# Patient Record
Sex: MALE | Race: WHITE | NOT HISPANIC OR LATINO | Employment: OTHER | URBAN - NONMETROPOLITAN AREA
[De-identification: names, ages, dates, MRNs, and addresses within clinical notes are randomized per-mention and may not be internally consistent; named-entity substitution may affect disease eponyms.]

---

## 2017-05-15 ENCOUNTER — OFFICE VISIT (OUTPATIENT)
Dept: FAMILY MEDICINE CLINIC | Facility: CLINIC | Age: 77
End: 2017-05-15

## 2017-05-15 VITALS
TEMPERATURE: 97.3 F | DIASTOLIC BLOOD PRESSURE: 76 MMHG | SYSTOLIC BLOOD PRESSURE: 140 MMHG | HEIGHT: 67 IN | WEIGHT: 186.2 LBS | BODY MASS INDEX: 29.22 KG/M2 | HEART RATE: 80 BPM

## 2017-05-15 DIAGNOSIS — G47.30 SLEEP APNEA, UNSPECIFIED TYPE: Chronic | ICD-10-CM

## 2017-05-15 DIAGNOSIS — E78.2 MIXED HYPERLIPIDEMIA: Chronic | ICD-10-CM
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
## 2017-05-15 DIAGNOSIS — I10 ESSENTIAL HYPERTENSION: Chronic | ICD-10-CM

## 2017-05-15 PROCEDURE — 99214 OFFICE O/P EST MOD 30 MIN: CPT | Performed by: NURSE PRACTITIONER

## 2017-05-15 RX ORDER — HYDROCHLOROTHIAZIDE 12.5 MG/1
12.5 TABLET ORAL
COMMUNITY
End: 2020-12-01

## 2017-05-15 RX ORDER — ENALAPRIL MALEATE 20 MG/1
20 TABLET ORAL DAILY
COMMUNITY
End: 2023-01-19

## 2017-05-15 RX ORDER — NITROGLYCERIN 0.4 MG/1
0.4 TABLET SUBLINGUAL
COMMUNITY

## 2017-05-15 RX ORDER — ISOSORBIDE DINITRATE 30 MG/1
30 TABLET ORAL
COMMUNITY
End: 2020-08-10

## 2017-05-15 RX ORDER — CLOPIDOGREL BISULFATE 75 MG/1
75 TABLET ORAL DAILY
COMMUNITY
End: 2023-01-19

## 2017-05-15 RX ORDER — ATORVASTATIN CALCIUM 80 MG/1
80 TABLET, FILM COATED ORAL NIGHTLY
COMMUNITY
End: 2023-01-19

## 2017-10-13 ENCOUNTER — OFFICE VISIT (OUTPATIENT)
Dept: FAMILY MEDICINE CLINIC | Facility: CLINIC | Age: 77
End: 2017-10-13

## 2017-10-13 VITALS
DIASTOLIC BLOOD PRESSURE: 96 MMHG | WEIGHT: 183 LBS | OXYGEN SATURATION: 98 % | SYSTOLIC BLOOD PRESSURE: 148 MMHG | TEMPERATURE: 97.6 F | HEIGHT: 67 IN | BODY MASS INDEX: 28.72 KG/M2 | HEART RATE: 65 BPM

## 2017-10-13 DIAGNOSIS — J01.90 ACUTE SINUSITIS, RECURRENCE NOT SPECIFIED, UNSPECIFIED LOCATION: Primary | ICD-10-CM

## 2017-10-13 DIAGNOSIS — J01.00 ACUTE NON-RECURRENT MAXILLARY SINUSITIS: Primary | ICD-10-CM

## 2017-10-13 PROCEDURE — 99213 OFFICE O/P EST LOW 20 MIN: CPT | Performed by: FAMILY MEDICINE

## 2017-10-13 PROCEDURE — 96372 THER/PROPH/DIAG INJ SC/IM: CPT | Performed by: FAMILY MEDICINE

## 2017-10-13 RX ORDER — AZITHROMYCIN 250 MG/1
TABLET, FILM COATED ORAL
Qty: 6 TABLET | Refills: 0 | Status: SHIPPED | OUTPATIENT
Start: 2017-10-13 | End: 2017-12-27

## 2017-10-13 RX ORDER — TRIAMCINOLONE ACETONIDE 40 MG/ML
40 INJECTION, SUSPENSION INTRA-ARTICULAR; INTRAMUSCULAR ONCE
Status: COMPLETED | OUTPATIENT
Start: 2017-10-13 | End: 2017-10-13

## 2017-10-13 RX ADMIN — TRIAMCINOLONE ACETONIDE 40 MG: 40 INJECTION, SUSPENSION INTRA-ARTICULAR; INTRAMUSCULAR at 13:12

## 2017-10-13 NOTE — PROGRESS NOTES
"Subjective   Chief Complaint   Patient presents with   • Nasal Congestion   • Cough     Steve Bethea is a 77 y.o. male.   Nasal Congestion and Cough    Cough   This is a new problem. The current episode started in the past 7 days. The problem has been unchanged. The cough is productive of sputum. Associated symptoms include headaches, postnasal drip and rhinorrhea. Pertinent negatives include no chest pain, chills, ear pain, fever, sore throat or shortness of breath. Treatments tried: warm compresses to the face. The treatment provided no relief. There is no history of asthma or COPD.   Complaining of jaw pain   Didn't sleep well last night   Uses CPAP    The following portions of the patient's history were reviewed and updated as appropriate: allergies, current medications, past family history, past medical history, past social history, past surgical history and problem list.    Review of Systems   Constitutional: Positive for fatigue. Negative for chills and fever.   HENT: Positive for congestion, postnasal drip, rhinorrhea, sinus pain and sinus pressure. Negative for ear discharge, ear pain and sore throat.    Respiratory: Positive for cough. Negative for shortness of breath.    Cardiovascular: Negative for chest pain.   Neurological: Positive for headaches.       Objective   /96  Pulse 65  Temp 97.6 °F (36.4 °C)  Ht 67\" (170.2 cm)  Wt 183 lb (83 kg)  SpO2 98%  BMI 28.66 kg/m2  Physical Exam   Constitutional: He is oriented to person, place, and time. He appears well-developed and well-nourished.   HENT:   Head: Normocephalic and atraumatic.   Right Ear: Hearing and ear canal normal.   Left Ear: Hearing and ear canal normal.   Nose: Rhinorrhea (clear) present. Right sinus exhibits maxillary sinus tenderness. Right sinus exhibits no frontal sinus tenderness. Left sinus exhibits maxillary sinus tenderness. Left sinus exhibits no frontal sinus tenderness.   Mouth/Throat: Posterior oropharyngeal " erythema present.   Postnasal drainage   Eyes: Pupils are equal, round, and reactive to light.   Neck: Normal range of motion. Neck supple.   Cardiovascular: Normal rate, regular rhythm and normal heart sounds.    Pulmonary/Chest: Effort normal and breath sounds normal. No respiratory distress. He has no wheezes. He has no rales.   Abdominal: Soft. Bowel sounds are normal.   Musculoskeletal: Normal range of motion.   Neurological: He is alert and oriented to person, place, and time.   Skin: Skin is warm and dry.   Psychiatric: He has a normal mood and affect.   Nursing note and vitals reviewed.      Assessment/Plan   Problems Addressed this Visit        Respiratory    Acute non-recurrent maxillary sinusitis - Primary        Declined kenalog IM    Requested abx  Start azithromycin  No interaction on medication list    Recheck as needed

## 2017-10-17 ENCOUNTER — OFFICE VISIT (OUTPATIENT)
Dept: FAMILY MEDICINE CLINIC | Facility: CLINIC | Age: 77
End: 2017-10-17

## 2017-10-17 VITALS
BODY MASS INDEX: 28.72 KG/M2 | TEMPERATURE: 97.4 F | SYSTOLIC BLOOD PRESSURE: 114 MMHG | HEART RATE: 65 BPM | OXYGEN SATURATION: 97 % | HEIGHT: 67 IN | DIASTOLIC BLOOD PRESSURE: 68 MMHG | WEIGHT: 183 LBS

## 2017-10-17 DIAGNOSIS — J01.10 ACUTE NON-RECURRENT FRONTAL SINUSITIS: Primary | ICD-10-CM

## 2017-10-17 PROCEDURE — 99213 OFFICE O/P EST LOW 20 MIN: CPT | Performed by: NURSE PRACTITIONER

## 2017-10-17 RX ORDER — AMOXICILLIN 500 MG/1
1000 CAPSULE ORAL 2 TIMES DAILY
Qty: 20 CAPSULE | Refills: 0 | Status: SHIPPED | OUTPATIENT
Start: 2017-10-17 | End: 2017-10-22

## 2017-10-17 RX ORDER — FLUTICASONE PROPIONATE 50 MCG
2 SPRAY, SUSPENSION (ML) NASAL DAILY
Qty: 1 BOTTLE | Refills: 0 | Status: SHIPPED | OUTPATIENT
Start: 2017-10-17 | End: 2020-11-24

## 2017-10-17 NOTE — PROGRESS NOTES
Subjective   Steve Bethea is a 77 y.o. male. Patient here today with complaints of Sinus Problem (Went to Dr Street last week and got steroid shot.)  pt here today with complaints of sore throat, nasal congestion, denies fever. Has been sick x Friday.     Vitals:    10/17/17 1309   BP: 114/68   Pulse: 65   Temp: 97.4 °F (36.3 °C)   SpO2: 97%     Past Medical History:   Diagnosis Date   • Coronary arteriosclerosis    • Degenerative joint disease involving multiple joints    • Hypertension    • Plantar fasciitis    • Upper respiratory infection      Sinus Problem   This is a new problem. The current episode started in the past 7 days. The problem has been gradually worsening since onset. There has been no fever. The pain is mild. Associated symptoms include congestion, ear pain, a hoarse voice and a sore throat. Pertinent negatives include no chills, coughing, headaches, neck pain, shortness of breath, sinus pressure or swollen glands. Treatments tried: steroid injection. The treatment provided no relief.        The following portions of the patient's history were reviewed and updated as appropriate: allergies, current medications, past family history, past medical history, past social history, past surgical history and problem list.    Review of Systems   Constitutional: Negative.  Negative for chills.   HENT: Positive for congestion, ear pain, hoarse voice and sore throat. Negative for sinus pressure.    Eyes: Negative.    Respiratory: Negative.  Negative for cough and shortness of breath.    Cardiovascular: Negative.    Gastrointestinal: Negative.    Endocrine: Negative.    Genitourinary: Negative.    Musculoskeletal: Negative.  Negative for neck pain.   Skin: Negative.    Allergic/Immunologic: Negative.    Neurological: Negative.  Negative for headaches.   Hematological: Negative.    Psychiatric/Behavioral: Negative.        Objective   Physical Exam   Constitutional: He is oriented to person, place, and  time. He appears well-developed and well-nourished. No distress.   HENT:   Head: Normocephalic and atraumatic.   Right Ear: No drainage, swelling or tenderness. No middle ear effusion. Decreased hearing is noted.   Left Ear: No drainage, swelling or tenderness. A middle ear effusion is present. Decreased hearing is noted.   Neck: Neck supple.   Cardiovascular: Normal rate, regular rhythm and normal heart sounds.  Exam reveals no gallop and no friction rub.    No murmur heard.  Pulmonary/Chest: Effort normal and breath sounds normal. No respiratory distress. He has no wheezes. He has no rales.   Musculoskeletal: Normal range of motion.   Neurological: He is alert and oriented to person, place, and time.   Skin: Skin is warm and dry. No rash noted. He is not diaphoretic. No erythema. No pallor.   Psychiatric: He has a normal mood and affect. His behavior is normal. Judgment and thought content normal.   Nursing note and vitals reviewed.      Assessment/Plan   Steve was seen today for sinus problem.    Diagnoses and all orders for this visit:    Acute non-recurrent frontal sinusitis    Other orders  -     fluticasone (FLONASE) 50 MCG/ACT nasal spray; 2 sprays into each nostril Daily.  -     amoxicillin (AMOXIL) 500 MG capsule; Take 2 capsules by mouth 2 (Two) Times a Day for 5 days.      He is advised to start pushing fluids, is given amoxil and flonase as above. Will RTC if his symptoms persist or worsen. All questions and concerns are addressed with understanding noted. The patient is in agreement to above plan.

## 2017-10-23 ENCOUNTER — OFFICE VISIT (OUTPATIENT)
Dept: FAMILY MEDICINE CLINIC | Facility: CLINIC | Age: 77
End: 2017-10-23

## 2017-10-23 VITALS
BODY MASS INDEX: 26.28 KG/M2 | TEMPERATURE: 98.4 F | DIASTOLIC BLOOD PRESSURE: 64 MMHG | SYSTOLIC BLOOD PRESSURE: 122 MMHG | HEART RATE: 60 BPM | WEIGHT: 183.6 LBS | HEIGHT: 70 IN

## 2017-10-23 DIAGNOSIS — J01.00 ACUTE NON-RECURRENT MAXILLARY SINUSITIS: ICD-10-CM

## 2017-10-23 DIAGNOSIS — H65.03 BILATERAL ACUTE SEROUS OTITIS MEDIA, RECURRENCE NOT SPECIFIED: Primary | ICD-10-CM

## 2017-10-23 DIAGNOSIS — H65.03 BILATERAL ACUTE SEROUS OTITIS MEDIA, RECURRENCE NOT SPECIFIED: ICD-10-CM

## 2017-10-23 DIAGNOSIS — J01.10 ACUTE NON-RECURRENT FRONTAL SINUSITIS: Primary | ICD-10-CM

## 2017-10-23 PROCEDURE — 99213 OFFICE O/P EST LOW 20 MIN: CPT | Performed by: NURSE PRACTITIONER

## 2017-10-23 RX ORDER — PREDNISONE 20 MG/1
20 TABLET ORAL 2 TIMES DAILY
Qty: 10 TABLET | Refills: 0 | Status: SHIPPED | OUTPATIENT
Start: 2017-10-23 | End: 2017-10-28

## 2017-10-23 NOTE — PROGRESS NOTES
Subjective   Steve Bethea is a 77 y.o. male. Patient here today with complaints of Sinusitis  Patient here today with complaints of still feeling sick with sinus congestion, nasal congestion, cough.  He has been having these symptoms ×2 weeks, was seen by Dr. Street, here and again in urgent care and over this time has been given Rocephin injection, steroid injection, symptoms persisted.  Was seen in urgent care on Friday and was started on doxycycline which he states he is currently taking.  He still complaining of small amount of productive cough with odorous nasal drainage, denies headache denies fever.  Would like to be referred on to ENT for further evaluation at this point since symptoms have not significantly improved.    Vitals:    10/23/17 1341   BP: 122/64   Pulse: 60   Temp: 98.4 °F (36.9 °C)     Past Medical History:   Diagnosis Date   • Coronary arteriosclerosis    • Degenerative joint disease involving multiple joints    • Hypertension    • Plantar fasciitis    • Upper respiratory infection      Sinusitis   This is a new problem. The current episode started 1 to 4 weeks ago. The problem is unchanged. There has been no fever. He is experiencing no pain. Associated symptoms include congestion, coughing, headaches, sinus pressure, sneezing and a sore throat. Pertinent negatives include no chills, diaphoresis, ear pain, hoarse voice, neck pain, shortness of breath or swollen glands. Past treatments include antibiotics and saline sprays (injection steroid). The treatment provided no relief.        The following portions of the patient's history were reviewed and updated as appropriate: allergies, current medications, past family history, past medical history, past social history, past surgical history and problem list.    Review of Systems   Constitutional: Negative.  Negative for chills and diaphoresis.   HENT: Positive for congestion, sinus pressure, sneezing and sore throat. Negative for ear pain  and hoarse voice.    Eyes: Negative.    Respiratory: Positive for cough. Negative for shortness of breath.    Cardiovascular: Negative.    Gastrointestinal: Negative.    Endocrine: Negative.    Genitourinary: Negative.    Musculoskeletal: Negative.  Negative for neck pain.   Skin: Negative.    Allergic/Immunologic: Negative.    Neurological: Positive for headaches.   Hematological: Negative.    Psychiatric/Behavioral: Negative.        Objective   Physical Exam   Constitutional: He is oriented to person, place, and time. He appears well-developed and well-nourished. No distress.   HENT:   Head: Normocephalic and atraumatic.   Right Ear: Hearing, external ear and ear canal normal. A middle ear effusion is present.   Left Ear: Hearing, external ear and ear canal normal. A middle ear effusion is present.   Nose: Right sinus exhibits no maxillary sinus tenderness and no frontal sinus tenderness. Left sinus exhibits no maxillary sinus tenderness and no frontal sinus tenderness.   Mouth/Throat: Uvula is midline, oropharynx is clear and moist and mucous membranes are normal. No tonsillar exudate.   Neck: Neck supple.   Cardiovascular: Normal rate, regular rhythm and normal heart sounds.  Exam reveals no gallop and no friction rub.    No murmur heard.  Pulmonary/Chest: Effort normal and breath sounds normal. No respiratory distress. He has no wheezes. He has no rales.   Lymphadenopathy:     He has no cervical adenopathy.   Neurological: He is alert and oriented to person, place, and time.   Skin: Skin is warm and dry. No rash noted. He is not diaphoretic. No erythema. No pallor.   Psychiatric: He has a normal mood and affect. His behavior is normal. Judgment and thought content normal. His speech is rapid and/or pressured. He is not actively hallucinating. Cognition and memory are normal. He is attentive.   Nursing note and vitals reviewed.      Assessment/Plan   Steve was seen today for sinusitis.    Diagnoses and all  orders for this visit:    Acute non-recurrent frontal sinusitis    Acute non-recurrent maxillary sinusitis    Bilateral acute serous otitis media, recurrence not specified    Other orders  -     predniSONE (DELTASONE) 20 MG tablet; Take 1 tablet by mouth 2 (Two) Times a Day for 5 days.     He is advised to continue on doxycycline as urgent care has prescribed on Friday.  I will add by mouth steroids as above and will refer him to ENT of choice for further evaluation and treatment as they deem necessary.  He is aware.  She does complaints persist or worsen prior to seeing ENT he can return here for follow-up.  He is aware and is in agreement to this plan.  All questions and concerns are addressed with understanding noted.  Records from urgent care were reviewed.

## 2017-12-27 ENCOUNTER — OFFICE VISIT (OUTPATIENT)
Dept: FAMILY MEDICINE CLINIC | Facility: CLINIC | Age: 77
End: 2017-12-27

## 2017-12-27 VITALS
HEART RATE: 85 BPM | TEMPERATURE: 98.4 F | WEIGHT: 186.2 LBS | DIASTOLIC BLOOD PRESSURE: 58 MMHG | BODY MASS INDEX: 26.66 KG/M2 | SYSTOLIC BLOOD PRESSURE: 120 MMHG | HEIGHT: 70 IN

## 2017-12-27 DIAGNOSIS — H65.03 BILATERAL ACUTE SEROUS OTITIS MEDIA, RECURRENCE NOT SPECIFIED: Primary | ICD-10-CM

## 2017-12-27 PROCEDURE — 96372 THER/PROPH/DIAG INJ SC/IM: CPT | Performed by: NURSE PRACTITIONER

## 2017-12-27 PROCEDURE — 99213 OFFICE O/P EST LOW 20 MIN: CPT | Performed by: NURSE PRACTITIONER

## 2017-12-27 RX ORDER — TRIAMCINOLONE ACETONIDE 40 MG/ML
40 INJECTION, SUSPENSION INTRA-ARTICULAR; INTRAMUSCULAR ONCE
Status: COMPLETED | OUTPATIENT
Start: 2017-12-27 | End: 2017-12-27

## 2017-12-27 RX ADMIN — TRIAMCINOLONE ACETONIDE 40 MG: 40 INJECTION, SUSPENSION INTRA-ARTICULAR; INTRAMUSCULAR at 14:06

## 2017-12-27 NOTE — PROGRESS NOTES
"Subjective   Steve Bethea is a 77 y.o. male. Patient here today with complaints of Earache  pt here today with complaints of sounding \"bees\" and \" wind\" in R ear. Denies cough, fever, still has some thick nasal drg, off and on. Symptoms started this am.     Vitals:    12/27/17 1346   BP: 120/58   Pulse: 85   Temp: 98.4 °F (36.9 °C)     Past Medical History:   Diagnosis Date   • Coronary arteriosclerosis    • Degenerative joint disease involving multiple joints    • Hypertension    • Plantar fasciitis    • Upper respiratory infection      Earache    There is pain in the right ear. This is a new problem. The current episode started yesterday. The problem occurs constantly. The problem has been gradually worsening. There has been no fever. The pain is mild. Associated symptoms include hearing loss. Pertinent negatives include no abdominal pain, coughing, diarrhea, ear discharge, headaches, neck pain, rash, rhinorrhea, sore throat or vomiting. He has tried nothing for the symptoms. The treatment provided no relief. His past medical history is significant for hearing loss.        The following portions of the patient's history were reviewed and updated as appropriate: allergies, current medications, past family history, past medical history, past social history, past surgical history and problem list.    Review of Systems   Constitutional: Negative.    HENT: Positive for ear pain and hearing loss. Negative for ear discharge, rhinorrhea and sore throat.    Eyes: Negative.    Respiratory: Negative.  Negative for cough.    Cardiovascular: Negative.    Gastrointestinal: Negative.  Negative for abdominal pain, diarrhea and vomiting.   Endocrine: Negative.    Genitourinary: Negative.    Musculoskeletal: Negative.  Negative for neck pain.   Skin: Negative.  Negative for rash.   Allergic/Immunologic: Negative.    Neurological: Negative.  Negative for headaches.   Hematological: Negative.    Psychiatric/Behavioral: " Negative.        Objective   Physical Exam   Constitutional: He is oriented to person, place, and time. He appears well-developed and well-nourished. No distress.   HENT:   Head: Normocephalic and atraumatic.   Right Ear: External ear and ear canal normal. There is tenderness. No drainage or swelling. Tympanic membrane is bulging. A middle ear effusion is present. Decreased hearing is noted.   Left Ear: Hearing, external ear and ear canal normal. No drainage, swelling or tenderness. A middle ear effusion is present. No decreased hearing is noted.   Nose: Right sinus exhibits no maxillary sinus tenderness and no frontal sinus tenderness. Left sinus exhibits no maxillary sinus tenderness and no frontal sinus tenderness.   Mouth/Throat: Uvula is midline, oropharynx is clear and moist and mucous membranes are normal. No tonsillar exudate.   Neck: Neck supple.   Cardiovascular: Normal rate, regular rhythm and normal heart sounds.  Exam reveals no gallop and no friction rub.    No murmur heard.  Pulmonary/Chest: Effort normal and breath sounds normal. No respiratory distress. He has no wheezes. He has no rales.   Neurological: He is alert and oriented to person, place, and time.   Skin: Skin is warm and dry. No rash noted. He is not diaphoretic. No erythema. No pallor.   Psychiatric: He has a normal mood and affect. His behavior is normal.   Nursing note and vitals reviewed.      Assessment/Plan   Steve was seen today for earache.    Diagnoses and all orders for this visit:    Bilateral acute serous otitis media, recurrence not specified  -     triamcinolone acetonide (KENALOG-40) injection 40 mg; Inject 1 mL into the shoulder, thigh, or buttocks 1 (One) Time.        He is given Kenalog in office today as above and states he already has Flonase at home to use.  If he does not, he will call back for this prescription to be given to him.  She does symptoms persist or worsen he is to return to clinic for follow-up.   Otherwise, I will see him back as scheduled for chronic conditions.  All questions and concerns are addressed with understanding noted. The patient is aware and is in agreement to above plan.

## 2018-01-25 ENCOUNTER — OFFICE VISIT (OUTPATIENT)
Dept: FAMILY MEDICINE CLINIC | Facility: CLINIC | Age: 78
End: 2018-01-25

## 2018-01-25 VITALS
HEART RATE: 68 BPM | HEIGHT: 70 IN | TEMPERATURE: 97.5 F | DIASTOLIC BLOOD PRESSURE: 62 MMHG | WEIGHT: 177.4 LBS | BODY MASS INDEX: 25.4 KG/M2 | OXYGEN SATURATION: 98 % | SYSTOLIC BLOOD PRESSURE: 120 MMHG

## 2018-01-25 DIAGNOSIS — H65.01 RIGHT ACUTE SEROUS OTITIS MEDIA, RECURRENCE NOT SPECIFIED: ICD-10-CM

## 2018-01-25 DIAGNOSIS — R42 VERTIGO: Primary | ICD-10-CM

## 2018-01-25 DIAGNOSIS — I10 ESSENTIAL HYPERTENSION: Chronic | ICD-10-CM

## 2018-01-25 DIAGNOSIS — I25.10 CORONARY ARTERIOSCLEROSIS: Chronic | ICD-10-CM

## 2018-01-25 PROCEDURE — 99213 OFFICE O/P EST LOW 20 MIN: CPT | Performed by: NURSE PRACTITIONER

## 2018-01-25 NOTE — PROGRESS NOTES
Subjective   Steve Bethea is a 77 y.o. male. Patient here today with complaints of Dizziness  pt here today with complaints of vertigo, dizziness and occasional n/v. Symptoms started 2 weeks ago, at that time he was taken to [facility] ER for eval, given meclizine which does help somewhat, he has seen dr nunez who prescribed singulair however symptoms are persisting, not resolved. Has flonase to use but has not been using this. Denies fever, chest pain or shortness of breath, denies nasal congestion, sinus pain/pressure. Has seen dr tanja aguilar previously , requesting referral back to him. Cont to see dr tapia and has appt with him on 2-11-18    Vitals:    01/25/18 1010   BP: 120/62   Pulse: 68   Temp: 97.5 °F (36.4 °C)   SpO2: 98%     Past Medical History:   Diagnosis Date   • Coronary arteriosclerosis    • Degenerative joint disease involving multiple joints    • Hypertension    • Plantar fasciitis    • Upper respiratory infection      Dizziness   This is a new problem. The current episode started 1 to 4 weeks ago. The problem occurs constantly. The problem has been waxing and waning. Associated symptoms include vertigo. Pertinent negatives include no abdominal pain, anorexia, arthralgias, change in bowel habit, chest pain, chills, congestion, coughing, diaphoresis, fatigue, fever, headaches, joint swelling, myalgias, nausea, neck pain, numbness, rash, sore throat, swollen glands, urinary symptoms, visual change, vomiting or weakness. Nothing aggravates the symptoms. Treatments tried: meclizine, singulair. The treatment provided mild relief.        The following portions of the patient's history were reviewed and updated as appropriate: allergies, current medications, past family history, past medical history, past social history, past surgical history and problem list.    Review of Systems   Constitutional: Negative.  Negative for chills, diaphoresis, fatigue and fever.   HENT: Negative.  Negative for congestion and  sore throat.    Eyes: Negative.    Respiratory: Negative.  Negative for cough.    Cardiovascular: Negative.  Negative for chest pain.   Gastrointestinal: Negative.  Negative for abdominal pain, anorexia, change in bowel habit, nausea and vomiting.   Endocrine: Negative.    Genitourinary: Negative.    Musculoskeletal: Negative.  Negative for arthralgias, joint swelling, myalgias and neck pain.   Skin: Negative.  Negative for rash.   Allergic/Immunologic: Negative.    Neurological: Positive for dizziness and vertigo. Negative for weakness, numbness and headaches.   Hematological: Negative.    Psychiatric/Behavioral: Negative.        Objective   Physical Exam   Constitutional: He is oriented to person, place, and time. He appears well-developed and well-nourished. No distress.   HENT:   Head: Normocephalic and atraumatic.   Right Ear: Hearing, external ear and ear canal normal. Tympanic membrane is bulging. A middle ear effusion is present.   Left Ear: Hearing, tympanic membrane, external ear and ear canal normal.   Nose: Nose normal. Right sinus exhibits no maxillary sinus tenderness and no frontal sinus tenderness. Left sinus exhibits no maxillary sinus tenderness and no frontal sinus tenderness.   Mouth/Throat: Uvula is midline, oropharynx is clear and moist and mucous membranes are normal. No tonsillar exudate.   Neck: Neck supple. Normal carotid pulses present. Carotid bruit is not present.   Cardiovascular: Normal rate, regular rhythm and normal heart sounds.  Exam reveals no gallop and no friction rub.    No murmur heard.  Pulmonary/Chest: Effort normal and breath sounds normal. No respiratory distress. He has no wheezes. He has no rales.   Lymphadenopathy:     He has no cervical adenopathy.   Neurological: He is alert and oriented to person, place, and time.   Skin: He is not diaphoretic.   Psychiatric: He has a normal mood and affect. His behavior is normal. His speech is rapid and/or pressured.   Nursing note  and vitals reviewed.      Assessment/Plan   Steve was seen today for dizziness.    Diagnoses and all orders for this visit:    Vertigo    Right acute serous otitis media, recurrence not specified    Essential hypertension    Coronary arteriosclerosis    referred to dr tanja aguilar for further eval and treatment as he deems nec  Advised he restart flonase daily and use meclizine prn until he sees ENT  Can follow up here , urgent care, ER should symptoms persist or worsen prior to seeing ENT  All questions and concerns are addressed with understanding noted.   The patient is aware and is in agreement to above plan.

## 2018-01-29 DIAGNOSIS — H92.01 EARACHE ON RIGHT: ICD-10-CM

## 2018-01-29 DIAGNOSIS — R42 VERTIGO: Primary | ICD-10-CM

## 2018-02-20 ENCOUNTER — OFFICE VISIT (OUTPATIENT)
Dept: FAMILY MEDICINE CLINIC | Facility: CLINIC | Age: 78
End: 2018-02-20

## 2018-02-20 VITALS
SYSTOLIC BLOOD PRESSURE: 130 MMHG | BODY MASS INDEX: 25.71 KG/M2 | HEART RATE: 61 BPM | TEMPERATURE: 96.9 F | OXYGEN SATURATION: 97 % | DIASTOLIC BLOOD PRESSURE: 80 MMHG | WEIGHT: 179.6 LBS | RESPIRATION RATE: 14 BRPM | HEIGHT: 70 IN

## 2018-02-20 DIAGNOSIS — I10 ESSENTIAL HYPERTENSION: ICD-10-CM

## 2018-02-20 DIAGNOSIS — R42 VERTIGO: Primary | ICD-10-CM

## 2018-02-20 PROCEDURE — 99213 OFFICE O/P EST LOW 20 MIN: CPT | Performed by: FAMILY MEDICINE

## 2018-02-20 RX ORDER — ASPIRIN 81 MG/1
81 TABLET ORAL DAILY
COMMUNITY

## 2018-02-20 RX ORDER — PREDNISONE 20 MG/1
20 TABLET ORAL 2 TIMES DAILY
Qty: 10 TABLET | Refills: 0 | Status: SHIPPED | OUTPATIENT
Start: 2018-02-20 | End: 2018-05-21

## 2018-02-20 RX ORDER — MECLIZINE HYDROCHLORIDE 25 MG/1
25 TABLET ORAL 3 TIMES DAILY PRN
Qty: 30 TABLET | Refills: 2 | Status: SHIPPED | OUTPATIENT
Start: 2018-02-20 | End: 2018-03-13 | Stop reason: SDUPTHER

## 2018-02-20 NOTE — PROGRESS NOTES
Subjective   Steve Bethea is a 77 y.o. male.   Chief Complaint   Patient presents with   • Dizziness       Dizziness   This is a recurrent problem. The current episode started in the past 7 days. The problem occurs intermittently. The problem has been waxing and waning. Associated symptoms include nausea, vertigo and vomiting. The symptoms are aggravated by bending.        The following portions of the patient's history were reviewed and updated as appropriate: allergies, current medications, past family history, past medical history, past social history, past surgical history and problem list.    Review of Systems   Constitutional: Negative.    HENT: Negative.    Eyes: Negative.    Respiratory: Negative.    Cardiovascular: Negative.    Gastrointestinal: Positive for nausea and vomiting.   Endocrine: Negative.    Genitourinary: Negative.    Musculoskeletal: Negative.    Skin: Negative.    Allergic/Immunologic: Negative.    Neurological: Positive for dizziness and vertigo.   Hematological: Negative.    Psychiatric/Behavioral: Negative.    All other systems reviewed and are negative.      Objective   Physical Exam   Constitutional: He is oriented to person, place, and time. He appears well-developed and well-nourished.   HENT:   Head: Normocephalic and atraumatic.   Right Ear: External ear normal.   Left Ear: External ear normal.   Nose: Nose normal.   Mouth/Throat: Oropharynx is clear and moist.   Eyes: Conjunctivae and EOM are normal. Pupils are equal, round, and reactive to light.   Neck: Normal range of motion. Neck supple.   Cardiovascular: Normal rate, regular rhythm, normal heart sounds and intact distal pulses.  Exam reveals no gallop and no friction rub.    No murmur heard.  Pulmonary/Chest: Effort normal and breath sounds normal. He has no wheezes. He has no rales.   Abdominal: Soft. Bowel sounds are normal. He exhibits no mass. There is no tenderness. There is no rebound and no guarding.    Musculoskeletal: Normal range of motion.   Neurological: He is alert and oriented to person, place, and time. He has normal reflexes. A cranial nerve deficit is present. He exhibits normal muscle tone.   nystagmus   Skin: Skin is warm and dry. No rash noted.   Psychiatric: He has a normal mood and affect. His behavior is normal. Judgment and thought content normal.   Nursing note and vitals reviewed.      Assessment/Plan   Steve was seen today for dizziness.    Diagnoses and all orders for this visit:    Vertigo  -     Ambulatory Referral to ENT (Otolaryngology)    Essential hypertension    Other orders  -     predniSONE (DELTASONE) 20 MG tablet; Take 1 tablet by mouth 2 (Two) Times a Day.  -     meclizine (ANTIVERT) 25 MG tablet; Take 1 tablet by mouth 3 (Three) Times a Day As Needed for dizziness.

## 2018-03-13 ENCOUNTER — CLINICAL SUPPORT (OUTPATIENT)
Dept: AUDIOLOGY | Facility: CLINIC | Age: 78
End: 2018-03-13

## 2018-03-13 ENCOUNTER — OFFICE VISIT (OUTPATIENT)
Dept: OTOLARYNGOLOGY | Facility: CLINIC | Age: 78
End: 2018-03-13

## 2018-03-13 VITALS
WEIGHT: 180 LBS | DIASTOLIC BLOOD PRESSURE: 65 MMHG | HEIGHT: 70 IN | BODY MASS INDEX: 25.77 KG/M2 | SYSTOLIC BLOOD PRESSURE: 110 MMHG

## 2018-03-13 DIAGNOSIS — R42 DIZZINESS: Primary | ICD-10-CM

## 2018-03-13 DIAGNOSIS — H81.01 MENIERE'S DISEASE OF RIGHT EAR: ICD-10-CM

## 2018-03-13 DIAGNOSIS — H90.3 SENSORINEURAL HEARING LOSS, ASYMMETRICAL: Primary | ICD-10-CM

## 2018-03-13 DIAGNOSIS — H90.3 ASYMMETRICAL SENSORINEURAL HEARING LOSS: ICD-10-CM

## 2018-03-13 PROCEDURE — 99203 OFFICE O/P NEW LOW 30 MIN: CPT | Performed by: OTOLARYNGOLOGY

## 2018-03-13 RX ORDER — MECLIZINE HYDROCHLORIDE 25 MG/1
25 TABLET ORAL 3 TIMES DAILY PRN
Qty: 30 TABLET | Refills: 2 | Status: SHIPPED | OUTPATIENT
Start: 2018-03-13 | End: 2018-05-10 | Stop reason: SDUPTHER

## 2018-03-13 RX ORDER — ONDANSETRON 4 MG/1
4 TABLET, FILM COATED ORAL EVERY 8 HOURS PRN
COMMUNITY
End: 2020-11-24

## 2018-03-13 RX ORDER — TRIAMTERENE AND HYDROCHLOROTHIAZIDE 37.5; 25 MG/1; MG/1
TABLET ORAL
Qty: 30 TABLET | Refills: 0 | Status: SHIPPED | OUTPATIENT
Start: 2018-03-13 | End: 2020-11-24

## 2018-03-13 NOTE — PROGRESS NOTES
STANDARD AUDIOMETRIC EVALUATION      Name:  Steve Bethea  :  1940  Age:  77 y.o.  Date of Evaluation:  3/13/2018      HISTORY    Reason for visit:  Steve Bethea is seen today for a hearing evaluation at the request of Dr. Steve Ritchie.  Patient reports he experienced a spinning dizziness on 2017.  He states he has hearing loss in both ears, and the hearing comes and goes in his right ear.  He states he hears noises in his right ear.      EVALUATION    See Audiogram    RESULTS        Otoscopy and Tympanometry 226 Hz :  Right Ear:  Otoscopy:  Testing completed after ears were examined by the ENT physician          Tympanometry:  Middle ear function within normal limits    Left Ear:   Otoscopy:  Testing completed after ears were examined by the ENT physician        Tympanometry:  Middle ear function within normal limits    Test technique:  Standard Audiometry     Pure Tone Audiometry:   Patient responded to pure tones at 30-70 dB for 250-8000 Hz in right ear, and at 20-65 dB for 250-8000 Hz in left ear.       Speech Audiometry:        Right Ear:  Speech Reception Threshold (SRT) was obtained at 30 dBHL                 Speech Discrimination scores were 72% in quiet when words were presented at 70 dBHL       Left Ear:  Speech Reception Threshold (SRT) was obtained at 20 dBHL                 Speech Discrimination scores were 96% in quiet when words were presented at 60 dBHL    Reliability:   good    IMPRESSIONS:  1.  Tympanometry results are consistent with Middle ear function within normal limits in both ears.  2.  Pure tone results are consistent with mild to moderately severe sloping sensorineural hearing loss  for right ear, and normal to moderately severe sloping, high frequency sensorineural hearing loss in left ear.       RECOMMENDATIONS:  Patient is seeing the Ear Nose and Throat physician immediately following this examination.  It was a pleasure seeing Steve Bethea in  Audiology today.  We would be happy to do further testing or discuss these test as necessary.          This document has been electronically signed by Bee Montoya MS CCC-A on March 13, 2018 3:50 PM       Bee Montoya MS CCC-A  Licensed Audiologist

## 2018-03-15 NOTE — PROGRESS NOTES
Subjective   Steve Bethea is a 77 y.o. male.     History of Present Illness   Patient is here for evaluation of dizziness.  Reports that on December 29 he awoke with a sensation of spinning dizziness and associated nausea.  Since that time as had recurring episodes of dizziness and associated nausea and vomiting, most recently approximately 10 or 12 days ago.  Has had 2 emergency room visits.  Has been taking meclizine.  Also feels like his hearing decreased in the right ear around the same time as he had the first episode of dizziness.  No otorrhea, no previous otologic surgery.      The following portions of the patient's history were reviewed and updated as appropriate: allergies, current medications, past family history, past medical history, past social history, past surgical history and problem list.      Steve Bethea reports that he has quit smoking. He has never used smokeless tobacco. He reports that he does not drink alcohol or use drugs.  Patient is not a tobacco user and has not been counseled for use of tobacco products    Family History   Problem Relation Age of Onset   • Alzheimer's disease Mother    • Colon cancer Father    • Stroke Other      uncle       No Known Allergies      Current Outpatient Prescriptions:   •  aspirin 81 MG EC tablet, Take 81 mg by mouth Daily., Disp: , Rfl:   •  atorvastatin (LIPITOR) 80 MG tablet, Take 80 mg by mouth., Disp: , Rfl:   •  Cholecalciferol 2000 UNITS tablet, Take 2,000 Units by mouth., Disp: , Rfl:   •  clopidogrel (PLAVIX) 75 MG tablet, Take 75 mg by mouth., Disp: , Rfl:   •  enalapril (VASOTEC) 20 MG tablet, Take 20 mg by mouth., Disp: , Rfl:   •  Flaxseed, Linseed, (FLAXSEED OIL PO), Take  by mouth., Disp: , Rfl:   •  hydrochlorothiazide (HYDRODIURIL) 12.5 MG tablet, Take 12.5 mg by mouth., Disp: , Rfl:   •  isosorbide dinitrate (ISORDIL) 30 MG tablet, Take 30 mg by mouth., Disp: , Rfl:   •  nitroglycerin (NITROSTAT) 0.4 MG SL tablet, Place 0.4 mg  under the tongue Every 5 (Five) Minutes., Disp: , Rfl:   •  Omega-3 Fatty Acids (FISH OIL PO), Take 1 capsule by mouth., Disp: , Rfl:   •  ondansetron (ZOFRAN) 4 MG tablet, Take 4 mg by mouth Every 8 (Eight) Hours As Needed for Nausea or Vomiting., Disp: , Rfl:   •  fluticasone (FLONASE) 50 MCG/ACT nasal spray, 2 sprays into each nostril Daily., Disp: 1 bottle, Rfl: 0  •  meclizine (ANTIVERT) 25 MG tablet, Take 1 tablet by mouth 3 (Three) Times a Day As Needed for dizziness., Disp: 30 tablet, Rfl: 2  •  predniSONE (DELTASONE) 20 MG tablet, Take 1 tablet by mouth 2 (Two) Times a Day., Disp: 10 tablet, Rfl: 0  •  triamterene-hydrochlorothiazide (MAXZIDE-25) 37.5-25 MG per tablet, Take 1/2 tablet PO QD, Disp: 30 tablet, Rfl: 0    Past Medical History:   Diagnosis Date   • Coronary arteriosclerosis    • Degenerative joint disease involving multiple joints    • Hypertension    • Plantar fasciitis    • Upper respiratory infection          Review of Systems   Constitutional: Positive for appetite change and unexpected weight change.   HENT: Positive for hearing loss.    Eyes: Positive for visual disturbance.   Gastrointestinal: Positive for vomiting.   All other systems reviewed and are negative.          Objective   Physical Exam    General: Well-developed well-nourished male in no acute distress.  Alert and oriented ×3. Head: Normocephalic. Face: Symmetrical strength and appearance. PERRL. EOMI. Voice:Strong. Speech:Fluent  Ears: External ears no deformity, canals no discharge, tympanic membranes intact clear and mobile bilaterally.  Nose: Nares show no discharge mass polyp or purulence.  Boggy mucosa is present.  No gross external deformity.  Septum: Midline  Oral cavity: Lips and gums without lesions.  Tongue and floor of mouth without lesions.  Parotid and submandibular ducts unobstructed.  No mucosal lesions on the buccal mucosa or vestibule of the mouth.  Pharynx: No erythema exudate mass or ulcer  Neck: No  lymphadenopathy.  No thyromegaly.  Trachea and larynx midline.  No masses in the parotid or submandibular glands.  [place]-Hallpike maneuvers produced no vertigo and no nystagmus    Audiogram shows asymmetrical sensorineural hearing loss worse on the right in the low frequencies from 250-2000 Hz then symmetrical above.  Tympanograms are type A bilaterally.  Discrimination scores are 96% on the left 72% on the right      Assessment/Plan   Steve was seen today for dizziness.    Diagnoses and all orders for this visit:    Dizziness    Asymmetrical sensorineural hearing loss    Meniere's disease of right ear        Plan: Given the fact that the patient had subjective hearing loss at the time of onset of dizziness along with low-frequency sensorineural hearing loss Ménière's disease is likely.  He is currently on 12.5 mg of HCTZ as part of his medical regimen for hypertension.  I want to add a half tablet of triamterene/HCTZ 37.5/25 once a day along with a low salt diet.  Patient is to return in 4 weeks with a repeat hearing test.  Objective improvement in the hearing in his right ear would confirm the diagnosis of Ménière's disease.  He may continue to utilize the meclizine if he feels it to be of benefit but he should just use it when necessary not regularly

## 2018-04-10 ENCOUNTER — LAB (OUTPATIENT)
Dept: LAB | Facility: OTHER | Age: 78
End: 2018-04-10

## 2018-04-10 ENCOUNTER — OFFICE VISIT (OUTPATIENT)
Dept: OTOLARYNGOLOGY | Facility: CLINIC | Age: 78
End: 2018-04-10

## 2018-04-10 ENCOUNTER — CLINICAL SUPPORT (OUTPATIENT)
Dept: AUDIOLOGY | Facility: CLINIC | Age: 78
End: 2018-04-10

## 2018-04-10 VITALS — HEIGHT: 70 IN | OXYGEN SATURATION: 97 % | BODY MASS INDEX: 25.62 KG/M2 | WEIGHT: 179 LBS

## 2018-04-10 DIAGNOSIS — H81.09 MENIERE'S DISEASE, UNSPECIFIED LATERALITY: Primary | ICD-10-CM

## 2018-04-10 DIAGNOSIS — H90.3 ASYMMETRICAL SENSORINEURAL HEARING LOSS: ICD-10-CM

## 2018-04-10 DIAGNOSIS — H81.09 MENIERE'S DISEASE, UNSPECIFIED LATERALITY: ICD-10-CM

## 2018-04-10 DIAGNOSIS — H91.8X3 OTHER SPECIFIED HEARING LOSS, BILATERAL: Primary | ICD-10-CM

## 2018-04-10 DIAGNOSIS — H81.01 MENIERE'S DISEASE OF RIGHT EAR: Primary | ICD-10-CM

## 2018-04-10 PROCEDURE — 80048 BASIC METABOLIC PNL TOTAL CA: CPT | Performed by: OTOLARYNGOLOGY

## 2018-04-10 PROCEDURE — 99213 OFFICE O/P EST LOW 20 MIN: CPT | Performed by: OTOLARYNGOLOGY

## 2018-04-10 PROCEDURE — 36415 COLL VENOUS BLD VENIPUNCTURE: CPT | Performed by: OTOLARYNGOLOGY

## 2018-04-10 NOTE — PROGRESS NOTES
"STANDARD AUDIOMETRIC EVALUATION      Name:  Steve Bethea  :  1940  Age:  77 y.o.  Date of Evaluation:  4/10/2018      HISTORY    Reason for visit:  Steve Bethea is seen today for a hearing evaluation at the request of Dr. Steve Ritchie.  Patient reports his dizziness is a little better.  He states he some hearing loss in his right ear, and he hears a \"wind\" noise in his right ear.      EVALUATION    See Audiogram    RESULTS        Otoscopy and Tympanometry 226 Hz :  Right Ear:  Otoscopy:  Clear ear canal          Tympanometry:  Middle ear function within normal limits    Left Ear:   Otoscopy:  Clear ear canal        Tympanometry:  Middle ear function within normal limits    Test technique:  Standard Audiometry     Pure Tone Audiometry:   Patient responded to pure tones at 35-75 dB for 250-8000 Hz in right ear, and at 15-70 dB for 250-8000 Hz in left ear.       Speech Audiometry:        Right Ear:  Speech Reception Threshold (SRT) was obtained at 40 dBHL                 Speech Discrimination scores were 76% in quiet when words were presented at 75 dBHL       Left Ear:  Speech Reception Threshold (SRT) was obtained at 5 dBHL                 Speech Discrimination scores were 84% in quiet when words were presented at 50 dBHL    Reliability:   good    IMPRESSIONS:  1.  Tympanometry results are consistent with Middle ear function within normal limits in both ears.  2.  Pure tone results are consistent with mild to moderately severe reverse cookie bite sensorineural hearing loss  for right ear, and normal to moderately severe reverse cookie bite mixed hearing loss in left ear.       RECOMMENDATIONS:  Patient is seeing the Ear Nose and Throat physician immediately following this examination.  It was a pleasure seeing Steve Bethea in Audiology today.  We would be happy to do further testing or discuss these test as necessary.          This document has been electronically signed by Bee " Suhail Montoya, MS CCC-A on April 10, 2018 3:45 PM       Bee Montoya, MS MADDEN-A  Licensed Audiologist

## 2018-04-11 LAB
ANION GAP SERPL CALCULATED.3IONS-SCNC: 9 MMOL/L (ref 5–15)
BUN BLD-MCNC: 15 MG/DL (ref 8–25)
BUN/CREAT SERPL: 15 (ref 7–25)
CALCIUM SPEC-SCNC: 9 MG/DL (ref 8.4–10.8)
CHLORIDE SERPL-SCNC: 102 MMOL/L (ref 100–112)
CO2 SERPL-SCNC: 28 MMOL/L (ref 20–32)
CREAT BLD-MCNC: 1 MG/DL (ref 0.4–1.3)
GFR SERPL CREATININE-BSD FRML MDRD: 73 ML/MIN/1.73 (ref 42–98)
GLUCOSE BLD-MCNC: 97 MG/DL (ref 70–100)
POTASSIUM BLD-SCNC: 4 MMOL/L (ref 3.4–5.4)
SODIUM BLD-SCNC: 139 MMOL/L (ref 134–146)

## 2018-04-12 ENCOUNTER — CLINICAL SUPPORT (OUTPATIENT)
Dept: AUDIOLOGY | Facility: CLINIC | Age: 78
End: 2018-04-12

## 2018-04-12 ENCOUNTER — OFFICE VISIT (OUTPATIENT)
Dept: OTOLARYNGOLOGY | Facility: CLINIC | Age: 78
End: 2018-04-12

## 2018-04-12 VITALS
HEIGHT: 70 IN | BODY MASS INDEX: 25.05 KG/M2 | SYSTOLIC BLOOD PRESSURE: 130 MMHG | DIASTOLIC BLOOD PRESSURE: 70 MMHG | WEIGHT: 175 LBS

## 2018-04-12 DIAGNOSIS — H90.3 ASYMMETRICAL SENSORINEURAL HEARING LOSS: ICD-10-CM

## 2018-04-12 DIAGNOSIS — H90.3 SENSORINEURAL HEARING LOSS, ASYMMETRICAL: Primary | ICD-10-CM

## 2018-04-12 DIAGNOSIS — R11.2 NON-INTRACTABLE VOMITING WITH NAUSEA, UNSPECIFIED VOMITING TYPE: ICD-10-CM

## 2018-04-12 DIAGNOSIS — H81.01 MENIERE'S DISEASE OF RIGHT EAR: Primary | ICD-10-CM

## 2018-04-12 PROCEDURE — 99214 OFFICE O/P EST MOD 30 MIN: CPT | Performed by: OTOLARYNGOLOGY

## 2018-04-12 NOTE — PROGRESS NOTES
Subjective   Steve Bethea is a 77 y.o. male.       History of Present Illness   Patient was seen previously with a history of dizziness and asymmetrical sensorineural hearing loss with a low frequency component on the right.  Also had aural fullness and subjective decreased hearing that occurred at the same time his dizziness started and it was felt he likely had right-sided Ménière's disease.  Patient was already taking 12.5 mg of HCTZ as part of his regimen for hypertension.  One half tablet of triamterene/HCTZ 37.5/25 was added to his daily medical regimen.  He returns today stating that he thinks he is some better.  He is not had any of the bad dizzy spells.  He had some dizziness the night of April 1 and took some meclizine and was better by the next day.  He says he still hears sounds like wind in his right ear and that was worse when he was dizzy on April 1.  He thinks his hearing is now somewhat better.      The following portions of the patient's history were reviewed and updated as appropriate: allergies, current medications, past family history, past medical history, past social history, past surgical history and problem list.     reports that he has quit smoking. He has never used smokeless tobacco. He reports that he does not drink alcohol or use drugs.   Patient is not a tobacco user and has not been counseled for use of tobacco products      Review of Systems   Constitutional: Negative for fever.   HENT: Positive for hearing loss.    Neurological: Positive for dizziness.           Objective   Physical Exam  Ears: External ears no deformity.  Canals no discharge.  Tympanic membranes are intact with tympanosclerosis no infection or effusion  Nose: Nares show no discharge mass polyp or purulence.  Boggy mucosa is present.  No gross external deformity.    Oral cavity: Lips and gums without lesions.  Tongue and floor of mouth without lesions.  Parotid and submandibular ducts unobstructed.  No mucosal  lesions on the buccal mucosa or vestibule of the mouth.  Pharynx: No erythema or exudate  Neck: No lymphadenopathy.  No thyromegaly.  Trachea and larynx midline.  No masses in the parotid or submandibular glands.    Audiogram is obtained and reviewed and shows asymmetrical sensorineural hearing loss again with a low frequency component on the right that is actually slightly worse than on previous audiogram at 250 and 500 Hz but slightly improved at 1000 Hz tympanograms are type A bilaterally discrimination scores are 76% on the right 84% on the left.      Assessment/Plan   Steve was seen today for follow-up.    Diagnoses and all orders for this visit:    Meniere's disease of right ear    Asymmetrical sensorineural hearing loss        Plan: Even though his hearing is object locally worse in a couple frequencies the fact that he is symptomatically improved is indicative of continued Ménière's disease in the right ear.  Since he feels like his dizziness is somewhat improved and subjectively his hearing is better I just want to continue his current medical regimen.  Will check Chem-7 today.  If symptoms remain reasonably well controlled return in 6 months with a repeat audiogram but call sooner for problems.

## 2018-04-13 NOTE — PROGRESS NOTES
STANDARD AUDIOMETRIC EVALUATION      Name:  Steve Bethea  :  1940  Age:  77 y.o.  Date of Evaluation:  2018    HISTORY    Reason for visit:  Steve Bethea is seen today for a hearing evaluation at the request of Dr. Steve Ritchie.  Patient reports that he hasn't noticed any changes in his hearing.  He reports that his dizziness was bad yesterday.      EVALUATION    See Audiogram    RESULTS        Otoscopy and Tympanometry 226 Hz :  Right Ear:  Otoscopy:  Clear ear canal          Tympanometry:  Middle ear function within normal limits    Left Ear:   Otoscopy:  Clear ear canal        Tympanometry:  Middle ear function within normal limits    Test technique:  Standard Audiometry     Pure Tone Audiometry:   Patient responded to pure tones at 25-70 dB for 250-8000 Hz in right ear, and at 15-60 dB for 250-8000 Hz in left ear.       Speech Audiometry:        Right Ear:  Speech Reception Threshold (SRT) was obtained at 25 dBHL                 Speech Discrimination scores were 72% in quiet when words were presented at 60 dBHL       Left Ear:  Speech Reception Threshold (SRT) was obtained at 10 dBHL                 Speech Discrimination scores were 100% in quiet when words were presented at 50 dBHL    Reliability:   good    IMPRESSIONS:  1.  Tympanometry results are consistent with Middle ear function within normal limits in both ears.  2.  Pure tone results are consistent with within normal limits to moderately-sevre sloping sensorineural hearing loss for both ears.       RECOMMENDATIONS:  Patient is seeing the Ear Nose and Throat physician immediately following this examination.  It was a pleasure seeing Steve Bethea in Audiology today.  We would be happy to do further testing or discuss these test as necessary.          This document has been electronically signed by DEE Wilson on 2018 8:31 AM          DEE Wilson  Licensed Audiologist

## 2018-04-15 NOTE — PROGRESS NOTES
Subjective   Steve Bethea is a 77 y.o. male.       History of Present Illness   Patient is followed with what appears to be right-sided Ménière's disease.  Has asymmetrical sensorineural hearing loss worse on the right than the left.  Appear to be improving with low-dose Dyazide although was just seen on 4/10/18 and had decrease in pure-tone thresholds on the right but was symptomatically improved including less dizziness and less nausea.  Asked for an appointment today was last night he felt like he became dizzy again however, in talking to him it sounds like he 8 cornbread and CABG and subsequently began vomiting copiously and the dizziness was associated with the vomiting but not necessarily preceding the vomiting.  He apparently has a good bit of trouble with vomiting on an intermittent basis.  He still hears the sound like wind in his right ear.  He has been prescribed Zofran but did not take this with his episode of vomiting last night.      The following portions of the patient's history were reviewed and updated as appropriate: allergies, current medications, past family history, past medical history, past social history, past surgical history and problem list.     reports that he has quit smoking. He has never used smokeless tobacco. He reports that he does not drink alcohol or use drugs.   Patient is not a tobacco user and has not been counseled for use of tobacco products      Review of Systems   HENT: Positive for hearing loss.    Gastrointestinal: Positive for nausea and vomiting.           Objective   Physical Exam  General: Well-developed well-nourished male in no acute distress.  Alert and oriented.  Head normocephalic.  Voice: Strong.  Speech: Fluent. Eyes: No nystagmus  Ears: External ears no deformity, canals no discharge, tympanic membranes intact clear and mobile bilaterally.  Nose: Nares show no discharge mass polyp or purulence.  Boggy mucosa is present.  No gross external deformity.     Oral cavity: Lips and gums without lesions.  Tongue and floor of mouth without lesions.  Parotid and submandibular ducts unobstructed.  No mucosal lesions on the buccal mucosa or vestibule of the mouth.  Pharynx: No erythema or exudate  Neck: No lymphadenopathy.  No thyromegaly.  Trachea and larynx midline.  No masses in the parotid or submandibular glands.    Audiogram is repeated today and shows significant improvement in the sensorineural thresholds on the right in the low pitches compared to just 3 days ago.  This is actually as good or better than the previous hearing test when he was first seen.    Assessment/Plan   Steve was seen today for follow-up.    Diagnoses and all orders for this visit:    Meniere's disease of right ear    Asymmetrical sensorineural hearing loss    Non-intractable vomiting with nausea, unspecified vomiting type        Plan: In light of the fact that he actually has objective improvement in his audiometric findings I suspect the nausea and vomiting are of gastrointestinal origin and are separate from he is Ménière's disease.  I have encouraged him to continue his low-dose diuretic, a low salt diet, and have encouraged him to use the Zofran that he has been prescribed for his nausea, however I have also suggested a GI referral because this is apparently a recurrent problem for him.  His Chem-7 was normal earlier this week and therefore will not be repeated.  If his symptoms return to baseline he is to keep his previously scheduled follow-up visit with me in 6 months but call sooner for problems or worsening symptoms.

## 2018-04-20 ENCOUNTER — OFFICE VISIT (OUTPATIENT)
Dept: GASTROENTEROLOGY | Facility: CLINIC | Age: 78
End: 2018-04-20

## 2018-04-20 VITALS
HEIGHT: 70 IN | BODY MASS INDEX: 24.79 KG/M2 | DIASTOLIC BLOOD PRESSURE: 74 MMHG | HEART RATE: 51 BPM | WEIGHT: 173.2 LBS | SYSTOLIC BLOOD PRESSURE: 120 MMHG

## 2018-04-20 DIAGNOSIS — R63.4 WEIGHT LOSS: ICD-10-CM

## 2018-04-20 DIAGNOSIS — K21.9 GASTROESOPHAGEAL REFLUX DISEASE, ESOPHAGITIS PRESENCE NOT SPECIFIED: ICD-10-CM

## 2018-04-20 DIAGNOSIS — R11.14 BILIOUS VOMITING WITH NAUSEA: Primary | ICD-10-CM

## 2018-04-20 PROCEDURE — 99214 OFFICE O/P EST MOD 30 MIN: CPT | Performed by: NURSE PRACTITIONER

## 2018-04-20 RX ORDER — DEXTROSE AND SODIUM CHLORIDE 5; .45 G/100ML; G/100ML
30 INJECTION, SOLUTION INTRAVENOUS CONTINUOUS PRN
Status: CANCELLED | OUTPATIENT
Start: 2018-05-11

## 2018-04-20 NOTE — PATIENT INSTRUCTIONS
Food Choices for Gastroesophageal Reflux Disease, Adult  When you have gastroesophageal reflux disease (GERD), the foods you eat and your eating habits are very important. Choosing the right foods can help ease your discomfort.  What guidelines do I need to follow?  · Choose fruits, vegetables, whole grains, and low-fat dairy products.  · Choose low-fat meat, fish, and poultry.  · Limit fats such as oils, salad dressings, butter, nuts, and avocado.  · Keep a food diary. This helps you identify foods that cause symptoms.  · Avoid foods that cause symptoms. These may be different for everyone.  · Eat small meals often instead of 3 large meals a day.  · Eat your meals slowly, in a place where you are relaxed.  · Limit fried foods.  · Cook foods using methods other than frying.  · Avoid drinking alcohol.  · Avoid drinking large amounts of liquids with your meals.  · Avoid bending over or lying down until 2-3 hours after eating.  What foods are not recommended?  These are some foods and drinks that may make your symptoms worse:  Vegetables   Tomatoes. Tomato juice. Tomato and spaghetti sauce. Chili peppers. Onion and garlic. Horseradish.  Fruits   Oranges, grapefruit, and lemon (fruit and juice).  Meats   High-fat meats, fish, and poultry. This includes hot dogs, ribs, ham, sausage, salami, and oyon.  Dairy   Whole milk and chocolate milk. Sour cream. Cream. Butter. Ice cream. Cream cheese.  Drinks   Coffee and tea. Bubbly (carbonated) drinks or energy drinks.  Condiments   Hot sauce. Barbecue sauce.  Sweets/Desserts   Chocolate and cocoa. Donuts. Peppermint and spearmint.  Fats and Oils   High-fat foods. This includes French fries and potato chips.  Other   Vinegar. Strong spices. This includes black pepper, white pepper, red pepper, cayenne, connelly powder, cloves, ginger, and chili powder.  The items listed above may not be a complete list of foods and drinks to avoid. Contact your dietitian for more information.    This information is not intended to replace advice given to you by your health care provider. Make sure you discuss any questions you have with your health care provider.  Document Released: 06/18/2013 Document Revised: 05/25/2017 Document Reviewed: 10/22/2014  [employer] Interactive Patient Education © 2017 [employer]  Esophagogastroduodenoscopy  Esophagogastroduodenoscopy (EGD) is a procedure to examine the lining of the esophagus, stomach, and first part of the small intestine (duodenum). This procedure is done to check for problems such as inflammation, bleeding, ulcers, or growths.  During this procedure, a long, flexible, lighted tube with a camera attached (endoscope) is inserted down the throat.  Tell a health care provider about:  · Any allergies you have.  · All medicines you are taking, including vitamins, herbs, eye drops, creams, and over-the-counter medicines.  · Any problems you or family members have had with anesthetic medicines.  · Any blood disorders you have.  · Any surgeries you have had.  · Any medical conditions you have.  · Whether you are pregnant or may be pregnant.  What are the risks?  Generally, this is a safe procedure. However, problems may occur, including:  · Infection.  · Bleeding.  · A tear (perforation) in the esophagus, stomach, or duodenum.  · Trouble breathing.  · Excessive sweating.  · Spasms of the larynx.  · A slowed heartbeat.  · Low blood pressure.  What happens before the procedure?  · Follow instructions from your health care provider about eating or drinking restrictions.  · Ask your health care provider about:  ¨ Changing or stopping your regular medicines. This is especially important if you are taking diabetes medicines or blood thinners.  ¨ Taking medicines such as aspirin and ibuprofen. These medicines can thin your blood. Do not take these medicines before your procedure if your health care provider instructs you not to.  · Plan to have someone take you home  after the procedure.  · If you wear dentures, be ready to remove them before the procedure.  What happens during the procedure?  · To reduce your risk of infection, your health care team will wash or sanitize their hands.  · An IV tube will be put in a vein in your hand or arm. You will get medicines and fluids through this tube.  · You will be given one or more of the following:  ¨ A medicine to help you relax (sedative).  ¨ A medicine to numb the area (local anesthetic). This medicine may be sprayed into your throat. It will make you feel more comfortable and keep you from gagging or coughing during the procedure.  ¨ A medicine for pain.  · A mouth guard may be placed in your mouth to protect your teeth and to keep you from biting on the endoscope.  · You will be asked to lie on your left side.  · The endoscope will be lowered down your throat into your esophagus, stomach, and duodenum.  · Air will be put into the endoscope. This will help your health care provider see better.  · The lining of your esophagus, stomach, and duodenum will be examined.  · Your health care provider may:  ¨ Take a tissue sample so it can be looked at in a lab (biopsy).  ¨ Remove growths.  ¨ Remove objects (foreign bodies) that are stuck.  ¨ Treat any bleeding with medicines or other devices that stop tissue from bleeding.  ¨ Widen (dilate) or stretch narrowed areas of your esophagus and stomach.  · The endoscope will be taken out.  The procedure may vary among health care providers and hospitals.  What happens after the procedure?  · Your blood pressure, heart rate, breathing rate, and blood oxygen level will be monitored often until the medicines you were given have worn off.  · Do not eat or drink anything until the numbing medicine has worn off and your gag reflex has returned.  This information is not intended to replace advice given to you by your health care provider. Make sure you discuss any questions you have with your health  care provider.  Document Released: 04/20/2006 Document Revised: 05/25/2017 Document Reviewed: 11/10/2016  [employer] Interactive Patient Education © 2017 [employer]

## 2018-04-20 NOTE — PROGRESS NOTES
Chief Complaint   Patient presents with   • Nausea   • Weight Loss       Subjective    Steve Bethea is a 77 y.o. male. he is being seen for consultation today at the request of No ref. provider found    Nausea   This is a chronic problem. The current episode started more than 1 month ago (about 4 months ago ). The problem occurs every several days (about once a week. Diagnosed with menieres disease. ). Associated symptoms include abdominal pain (acid reflux frequently, stomaching burning then comes up. He eats spicy food frequently ), anorexia (decreased appetite down 13 pounds), fatigue, nausea, neck pain, numbness (in fingers at times ), vomiting (acidic material ) and weakness. Pertinent negatives include no arthralgias, change in bowel habit, chest pain, chills, congestion, coughing, diaphoresis, fever, headaches, joint swelling, myalgias, rash, sore throat, swollen glands, urinary symptoms, vertigo or visual change. The symptoms are aggravated by eating (history of migraines ). He has tried rest and sleep for the symptoms. The treatment provided mild relief.   Weight Loss   This is a new problem. The current episode started more than 1 month ago. Associated symptoms include abdominal pain (acid reflux frequently, stomaching burning then comes up. He eats spicy food frequently ), anorexia (decreased appetite down 13 pounds), fatigue, nausea, neck pain, numbness (in fingers at times ), vomiting (acidic material ) and weakness. Pertinent negatives include no arthralgias, change in bowel habit, chest pain, chills, congestion, coughing, diaphoresis, fever, headaches, joint swelling, myalgias, rash, sore throat, swollen glands, urinary symptoms, vertigo or visual change.   Heartburn   He complains of abdominal pain (acid reflux frequently, stomaching burning then comes up. He eats spicy food frequently ), belching, early satiety, heartburn, a hoarse voice and nausea. He reports no chest pain, no choking, no  coughing, no sore throat, no stridor, no tooth decay, no water brash or no wheezing. This is a chronic problem. The current episode started more than 1 year ago. The problem occurs frequently. The problem has been waxing and waning. The heartburn is located in the substernum. The heartburn is of moderate intensity. The heartburn wakes him from sleep. The heartburn limits his activity. The heartburn changes with position. The symptoms are aggravated by certain foods, exertion and lying down. Associated symptoms include fatigue and weight loss. Pertinent negatives include no anemia. He has tried head elevation, a diet change and an antacid for the symptoms. The treatment provided mild relief.     Plan Continue dietary modifications. EGD due to nausea, weight loss and reflux symptoms.  I discussed starting PPI however patient states dietary modifications and times has significantly improved symptoms would refer to wait after procedure to start medication.   The following portions of the patient's history were reviewed and updated as appropriate:   Past Medical History:   Diagnosis Date   • Coronary arteriosclerosis    • Degenerative joint disease involving multiple joints    • Hypertension    • Plantar fasciitis    • Upper respiratory infection      Past Surgical History:   Procedure Laterality Date   • ANGIOPLASTY  12/17/2012    x 1 stent   • COLONOSCOPY     • INJECTION OF MEDICATION  04/03/2013    Inj(s) Tend-Sheath, Ligament, Single 80496 (1)       Family History   Problem Relation Age of Onset   • Alzheimer's disease Mother    • Hypertension Mother    • Colon cancer Father    • Hypertension Father    • Stroke Other      uncle   • Cancer Brother        Current Outpatient Prescriptions   Medication Sig Dispense Refill   • aspirin 81 MG EC tablet Take 81 mg by mouth Daily.     • atorvastatin (LIPITOR) 80 MG tablet Take 80 mg by mouth.     • Cholecalciferol 2000 UNITS tablet Take 2,000 Units by mouth.     • clopidogrel  (PLAVIX) 75 MG tablet Take 75 mg by mouth.     • enalapril (VASOTEC) 20 MG tablet Take 20 mg by mouth.     • Flaxseed, Linseed, (FLAXSEED OIL PO) Take  by mouth.     • fluticasone (FLONASE) 50 MCG/ACT nasal spray 2 sprays into each nostril Daily. 1 bottle 0   • hydrochlorothiazide (HYDRODIURIL) 12.5 MG tablet Take 12.5 mg by mouth.     • isosorbide dinitrate (ISORDIL) 30 MG tablet Take 30 mg by mouth.     • meclizine (ANTIVERT) 25 MG tablet Take 1 tablet by mouth 3 (Three) Times a Day As Needed for dizziness. 30 tablet 2   • nitroglycerin (NITROSTAT) 0.4 MG SL tablet Place 0.4 mg under the tongue Every 5 (Five) Minutes.     • Omega-3 Fatty Acids (FISH OIL PO) Take 1 capsule by mouth.     • ondansetron (ZOFRAN) 4 MG tablet Take 4 mg by mouth Every 8 (Eight) Hours As Needed for Nausea or Vomiting.     • predniSONE (DELTASONE) 20 MG tablet Take 1 tablet by mouth 2 (Two) Times a Day. 10 tablet 0   • triamterene-hydrochlorothiazide (MAXZIDE-25) 37.5-25 MG per tablet Take 1/2 tablet PO QD 30 tablet 0     No current facility-administered medications for this visit.      No Known Allergies  Social History     Social History   • Marital status: Single     Social History Main Topics   • Smoking status: Former Smoker   • Smokeless tobacco: Never Used   • Alcohol use No   • Drug use: No   • Sexual activity: Defer     Other Topics Concern   • Not on file       Review of Systems  Review of Systems   Constitutional: Positive for fatigue and weight loss. Negative for activity change, appetite change, chills, diaphoresis, fever and unexpected weight change.   HENT: Positive for hoarse voice. Negative for congestion, sore throat and trouble swallowing.    Respiratory: Negative for cough, choking, shortness of breath and wheezing.    Cardiovascular: Negative for chest pain.   Gastrointestinal: Positive for abdominal pain (acid reflux frequently, stomaching burning then comes up. He eats spicy food frequently ), anorexia (decreased  "appetite down 13 pounds), heartburn, nausea and vomiting (acidic material ). Negative for abdominal distention, anal bleeding, blood in stool, change in bowel habit, constipation, diarrhea and rectal pain.   Musculoskeletal: Positive for neck pain. Negative for arthralgias, joint swelling and myalgias.   Skin: Negative for pallor and rash.   Neurological: Positive for weakness and numbness (in fingers at times ). Negative for vertigo, light-headedness and headaches.        /74   Pulse 51   Ht 177.8 cm (70\")   Wt 78.6 kg (173 lb 3.2 oz)   BMI 24.85 kg/m²     Objective    Physical Exam   Constitutional: He is oriented to person, place, and time. He appears well-developed and well-nourished. He is cooperative. No distress.   HENT:   Head: Normocephalic and atraumatic.   Neck: Normal range of motion. Neck supple. No thyromegaly present.   Cardiovascular: Normal rate, regular rhythm and normal heart sounds.    Pulmonary/Chest: Effort normal and breath sounds normal. He has no wheezes. He has no rhonchi. He has no rales.   Abdominal: Soft. Normal appearance and bowel sounds are normal. He exhibits no distension. There is no hepatosplenomegaly. There is no tenderness. There is no rigidity and no guarding. No hernia.   Lymphadenopathy:     He has no cervical adenopathy.   Neurological: He is alert and oriented to person, place, and time.   Skin: Skin is warm, dry and intact. No rash noted. No pallor.   Psychiatric: He has a normal mood and affect. His speech is normal.     Lab on 04/10/2018   Component Date Value Ref Range Status   • Glucose 04/11/2018 97  70 - 100 mg/dL Final   • BUN 04/11/2018 15  8 - 25 mg/dL Final   • Creatinine 04/11/2018 1.00  0.40 - 1.30 mg/dL Final   • Sodium 04/11/2018 139  134 - 146 mmol/L Final   • Potassium 04/11/2018 4.0  3.4 - 5.4 mmol/L Final   • Chloride 04/11/2018 102  100 - 112 mmol/L Final   • CO2 04/11/2018 28.0  20.0 - 32.0 mmol/L Final   • Calcium 04/11/2018 9.0  8.4 - 10.8 " mg/dL Final   • eGFR Non African Amer 04/11/2018 73  42 - 98 mL/min/1.73 Final   • BUN/Creatinine Ratio 04/11/2018 15.0  7.0 - 25.0 Final   • Anion Gap 04/11/2018 9.0  5.0 - 15.0 mmol/L Final     Assessment/Plan      1. Bilious vomiting with nausea    2. Gastroesophageal reflux disease, esophagitis presence not specified    3. Weight loss    .       Orders placed during this encounter include:      ESOPHAGOGASTRODUODENOSCOPY possible dilation (N/A)    Review and/or summary of lab tests, radiology, procedures, medications. Review and summary of old records and obtaining of history. The risks and benefits of my recommendations, as well as other treatment options were discussed with the patient today. Questions were answered.    No orders of the defined types were placed in this encounter.      Follow-up: Return for Recheck after EGD .          This document has been electronically signed by VONDA Pearson on April 20, 2018 9:21 AM             Results for orders placed or performed in visit on 04/10/18   Basic Metabolic Panel   Result Value Ref Range    Glucose 97 70 - 100 mg/dL    BUN 15 8 - 25 mg/dL    Creatinine 1.00 0.40 - 1.30 mg/dL    Sodium 139 134 - 146 mmol/L    Potassium 4.0 3.4 - 5.4 mmol/L    Chloride 102 100 - 112 mmol/L    CO2 28.0 20.0 - 32.0 mmol/L    Calcium 9.0 8.4 - 10.8 mg/dL    eGFR Non African Amer 73 42 - 98 mL/min/1.73    BUN/Creatinine Ratio 15.0 7.0 - 25.0    Anion Gap 9.0 5.0 - 15.0 mmol/L   Results for orders placed or performed during the hospital encounter of 08/24/16   PSA   Result Value Ref Range    PSA 2.78 0.00 - 4.00 ng/ml   Results for orders placed or performed during the hospital encounter of 10/12/15   D-dimer, quantitative   Result Value Ref Range    D-Dimer, Quant <100 0 - 399 ng/ml   CBC and Differential   Result Value Ref Range    WBC 8.5 3.2 - 9.8 x1000/uL    RBC 5.05 4.37 - 5.74 wilmar/mm3    Hemoglobin 15.8 13.7 - 17.3 gm/dl    Hematocrit 47.1 39.0 - 49.0 %    MCV 93.3  80.0 - 98.0 fl    MCH 31.3 26.0 - 34.0 pg    MCHC 33.5 31.5 - 36.3 gm/dl    Platelets 193 150 - 450 x1000/mm3    RDW 13.4 11.5 - 14.5 %    MPV 11.3 8.0 - 12.0 fl    Neutrophil Rel % 60.1 37.0 - 80.0 %    Lymphocyte Rel % 30.0 10.0 - 50.0 %    Monocyte Rel % 8.6 0.0 - 12.0 %    Eosinophil Rel % 0.9 0.0 - 7.0 %    Basophil Rel % 0.4 0.0 - 2.0 %    nRBC 0     nRBC 0    Comprehensive metabolic panel   Result Value Ref Range    Glucose 102 (H) 70.0 - 100.0 mg/dl    BUN 14 8.0 - 25.0 mg/dl    Creatinine 0.9 0.4 - 1.3 mg/dl    Sodium 140.0 134 - 146 mmol/L    Potassium 3.8 3.4 - 5.4 mmol/L    Chloride 104.0 100.0 - 112.0 mmol/L    CO2 30.0 20.0 - 32.0 mmol/L    Calcium 9.4 8.4 - 10.8 mg/dl    Total Protein 6.9 6.7 - 8.2 gm/dl    Albumin 4.1 3.2 - 5.5 gm/dl    Total Bilirubin 0.7 0.2 - 1.0 mg/dl    Alkaline Phosphatase 75 15 - 121 U/L    ALT (SGPT) 25 10 - 60 U/L    AST (SGOT) 26 10 - 60 U/L    GFR MDRD Non African American 83 42 - 98 mL/min/1.73 sq.M    GFR MDRD  100 (H) 42 - 98 mL/min/1.73 sq.M    Anion Gap 6.0 5.0 - 15.0 mmol/L   Results for orders placed or performed in visit on 08/27/15   PSA   Result Value Ref Range    PSA 3.02 0.00 - 4.00 ng/ml   Results for orders placed or performed in visit on 08/14/14   PSA   Result Value Ref Range    PSA 2.38 0.00 - 4.00 ng/ml

## 2018-05-08 ENCOUNTER — OFFICE VISIT (OUTPATIENT)
Dept: OTOLARYNGOLOGY | Facility: CLINIC | Age: 78
End: 2018-05-08

## 2018-05-08 VITALS — BODY MASS INDEX: 25.05 KG/M2 | WEIGHT: 175 LBS | OXYGEN SATURATION: 97 % | HEIGHT: 70 IN

## 2018-05-08 DIAGNOSIS — H81.01 MENIERE'S DISEASE OF RIGHT EAR: Primary | ICD-10-CM

## 2018-05-08 PROCEDURE — 99214 OFFICE O/P EST MOD 30 MIN: CPT | Performed by: OTOLARYNGOLOGY

## 2018-05-10 RX ORDER — MECLIZINE HYDROCHLORIDE 25 MG/1
25 TABLET ORAL 3 TIMES DAILY PRN
Qty: 30 TABLET | Refills: 5 | Status: SHIPPED | OUTPATIENT
Start: 2018-05-10 | End: 2020-11-24

## 2018-05-10 NOTE — PROGRESS NOTES
"Subjective   Steve Bethea is a 77 y.o. male.       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  A should also describes what he feels is vertigo but in fact usually seems to be associated with overreading or eating foods that he does not tolerate followed by vomiting and \"feeling bad\".  He does not have spinning dizziness.  He states that his most recent flareup recurred after he ate a large meal this past Wednesday.  He then states he felt bad enough that he couldn't get out of bed for a day or so.  However he did not have any symptoms when he was lying down.  He has been seen by gastroenterology and has been scheduled for an EGD.  He states he is taking a half a tablet a day of triamterene HCTZ    The following portions of the patient's history were reviewed and updated as appropriate: allergies, current medications, past family history, past medical history, past social history, past surgical history and problem list.     reports that he has quit smoking. He has never used smokeless tobacco. He reports that he does not drink alcohol or use drugs.   Patient is not a tobacco user and has not been counseled for use of tobacco products      Review of Systems   Constitutional: Negative for fever.   Gastrointestinal: Positive for nausea and vomiting.           Objective   Physical Exam  General: Well-developed well-nourished male in no acute distress.  EOMI without nystagmus.Voice:Strong. Speech:Fluent  Ears: External ears no deformity, canals no discharge, tympanic membranes intact clear and mobile bilaterally.  Nose: Nares show no discharge mass polyp or purulence.  Boggy mucosa is present.  No gross external deformity.  Septum: Midline  Oral cavity: Lips and gums without lesions.  Tongue and floor of mouth without lesions.  Parotid " and submandibular ducts unobstructed.  No mucosal lesions on the buccal mucosa or vestibule of the mouth.  Pharynx: No erythema exudate mass or ulcer  Neck: No lymphadenopathy.  No thyromegaly.  Trachea and larynx midline.  No masses in the parotid or submandibular glands.      Assessment/Plan   Steve was seen today for follow-up.    Diagnoses and all orders for this visit:    Meniere's disease of right ear    Other orders  -     meclizine (ANTIVERT) 25 MG tablet; Take 1 tablet by mouth 3 (Three) Times a Day As Needed for dizziness.      Plan: All the patient has audiometric evidence of Ménière's disease, at last visit his hearing had significantly improved, and it appears that most of his symptoms actually sound gastrointestinal and seem to be worsened after either overeating or eating foods that he did not tolerate well.  At this point I don't think adding additional diuretic would be of benefit he should continue his low-dose triamterene/HCTZ and a low salt diet and keep his follow-up visit with gastroenterology for evaluation for possible intestinal pathology that may be the source of his nausea and some of what he perceives to be dizziness but may actually be a vagal response to his gastrointestinal symptoms.  Otherwise he is to keep his previously scheduled visit later this year with repeat hearing test and call sooner for problems.  He may uses meclizine as needed for dizziness.

## 2018-05-11 ENCOUNTER — ANESTHESIA (OUTPATIENT)
Dept: GASTROENTEROLOGY | Facility: HOSPITAL | Age: 78
End: 2018-05-11

## 2018-05-11 ENCOUNTER — ANESTHESIA EVENT (OUTPATIENT)
Dept: GASTROENTEROLOGY | Facility: HOSPITAL | Age: 78
End: 2018-05-11

## 2018-05-11 ENCOUNTER — HOSPITAL ENCOUNTER (OUTPATIENT)
Facility: HOSPITAL | Age: 78
Setting detail: HOSPITAL OUTPATIENT SURGERY
Discharge: HOME | End: 2018-05-11
Attending: INTERNAL MEDICINE | Admitting: INTERNAL MEDICINE

## 2018-05-11 VITALS
DIASTOLIC BLOOD PRESSURE: 69 MMHG | WEIGHT: 175 LBS | OXYGEN SATURATION: 93 % | HEIGHT: 70 IN | HEART RATE: 56 BPM | BODY MASS INDEX: 25.05 KG/M2 | TEMPERATURE: 97.3 F | RESPIRATION RATE: 18 BRPM | SYSTOLIC BLOOD PRESSURE: 117 MMHG

## 2018-05-11 DIAGNOSIS — R63.4 WEIGHT LOSS: ICD-10-CM

## 2018-05-11 DIAGNOSIS — K21.9 GASTROESOPHAGEAL REFLUX DISEASE, ESOPHAGITIS PRESENCE NOT SPECIFIED: ICD-10-CM

## 2018-05-11 DIAGNOSIS — R11.14 BILIOUS VOMITING WITH NAUSEA: ICD-10-CM

## 2018-05-11 PROCEDURE — 88305 TISSUE EXAM BY PATHOLOGIST: CPT | Performed by: INTERNAL MEDICINE

## 2018-05-11 PROCEDURE — 25010000002 PROPOFOL 10 MG/ML EMULSION: Performed by: NURSE ANESTHETIST, CERTIFIED REGISTERED

## 2018-05-11 PROCEDURE — 43239 EGD BIOPSY SINGLE/MULTIPLE: CPT | Performed by: INTERNAL MEDICINE

## 2018-05-11 PROCEDURE — 88305 TISSUE EXAM BY PATHOLOGIST: CPT | Performed by: PATHOLOGY

## 2018-05-11 RX ORDER — DEXTROSE AND SODIUM CHLORIDE 5; .45 G/100ML; G/100ML
30 INJECTION, SOLUTION INTRAVENOUS CONTINUOUS PRN
Status: DISCONTINUED | OUTPATIENT
Start: 2018-05-11 | End: 2018-05-11 | Stop reason: HOSPADM

## 2018-05-11 RX ORDER — PROPOFOL 10 MG/ML
VIAL (ML) INTRAVENOUS AS NEEDED
Status: DISCONTINUED | OUTPATIENT
Start: 2018-05-11 | End: 2018-05-11 | Stop reason: SURG

## 2018-05-11 RX ORDER — LIDOCAINE HYDROCHLORIDE 10 MG/ML
INJECTION, SOLUTION INFILTRATION; PERINEURAL AS NEEDED
Status: DISCONTINUED | OUTPATIENT
Start: 2018-05-11 | End: 2018-05-11 | Stop reason: SURG

## 2018-05-11 RX ADMIN — DEXTROSE AND SODIUM CHLORIDE: 5; 450 INJECTION, SOLUTION INTRAVENOUS at 12:36

## 2018-05-11 RX ADMIN — DEXTROSE AND SODIUM CHLORIDE 30 ML/HR: 5; 450 INJECTION, SOLUTION INTRAVENOUS at 11:39

## 2018-05-11 RX ADMIN — PROPOFOL 80 MG: 10 INJECTION, EMULSION INTRAVENOUS at 12:42

## 2018-05-11 RX ADMIN — LIDOCAINE HYDROCHLORIDE 80 MG: 10 INJECTION, SOLUTION INFILTRATION; PERINEURAL at 12:42

## 2018-05-11 NOTE — ANESTHESIA PREPROCEDURE EVALUATION
Anesthesia Evaluation                  Airway   Mallampati: II  TM distance: >3 FB  Neck ROM: full  No difficulty expected  Dental - normal exam     Pulmonary - normal exam   (+) recent URI,   Cardiovascular - normal exam    (+) hypertension, CAD,       Neuro/Psych  (+) dizziness/light headedness,     GI/Hepatic/Renal/Endo    (+)  GERD,      Musculoskeletal     Abdominal  - normal exam   Substance History      OB/GYN          Other   (+) arthritis                     Anesthesia Plan    ASA 3     MAC     intravenous induction   Anesthetic plan and risks discussed with patient.

## 2018-05-11 NOTE — ANESTHESIA POSTPROCEDURE EVALUATION
Patient: Steve Bethea    Procedure Summary     Date:  05/11/18 Room / Location:  [facility] ENDOSCOPY 1 / [facility] ENDOSCOPY    Anesthesia Start:  1239 Anesthesia Stop:  1248    Procedure:  ESOPHAGOGASTRODUODENOSCOPY possible dilation (N/A Esophagus) Diagnosis:       Weight loss      Bilious vomiting with nausea      Gastroesophageal reflux disease, esophagitis presence not specified      (Weight loss [R63.4])      (Bilious vomiting with nausea [R11.14])      (Gastroesophageal reflux disease, esophagitis presence not specified [K21.9])    Surgeon:  Praneeth Ignacio MD Provider:  Diana Butler CRNA    Anesthesia Type:  MAC ASA Status:  3          Anesthesia Type: MAC  Last vitals  BP   141/77 (05/11/18 1124)   Temp   97.2 °F (36.2 °C) (05/11/18 1124)   Pulse   53 (05/11/18 1124)   Resp   20 (05/11/18 1124)     SpO2   98 % (05/11/18 1124)     Post Anesthesia Care and Evaluation    Patient location during evaluation: PACU  Patient participation: complete - patient participated  Level of consciousness: awake and alert  Pain management: adequate  Airway patency: patent  Anesthetic complications: No anesthetic complications    Cardiovascular status: acceptable  Respiratory status: acceptable  Hydration status: acceptable

## 2018-05-11 NOTE — H&P (VIEW-ONLY)
Chief Complaint   Patient presents with   • Nausea   • Weight Loss       Subjective    Steve Bethea is a 77 y.o. male. he is being seen for consultation today at the request of No ref. provider found    Nausea   This is a chronic problem. The current episode started more than 1 month ago (about 4 months ago ). The problem occurs every several days (about once a week. Diagnosed with menieres disease. ). Associated symptoms include abdominal pain (acid reflux frequently, stomaching burning then comes up. He eats spicy food frequently ), anorexia (decreased appetite down 13 pounds), fatigue, nausea, neck pain, numbness (in fingers at times ), vomiting (acidic material ) and weakness. Pertinent negatives include no arthralgias, change in bowel habit, chest pain, chills, congestion, coughing, diaphoresis, fever, headaches, joint swelling, myalgias, rash, sore throat, swollen glands, urinary symptoms, vertigo or visual change. The symptoms are aggravated by eating (history of migraines ). He has tried rest and sleep for the symptoms. The treatment provided mild relief.   Weight Loss   This is a new problem. The current episode started more than 1 month ago. Associated symptoms include abdominal pain (acid reflux frequently, stomaching burning then comes up. He eats spicy food frequently ), anorexia (decreased appetite down 13 pounds), fatigue, nausea, neck pain, numbness (in fingers at times ), vomiting (acidic material ) and weakness. Pertinent negatives include no arthralgias, change in bowel habit, chest pain, chills, congestion, coughing, diaphoresis, fever, headaches, joint swelling, myalgias, rash, sore throat, swollen glands, urinary symptoms, vertigo or visual change.   Heartburn   He complains of abdominal pain (acid reflux frequently, stomaching burning then comes up. He eats spicy food frequently ), belching, early satiety, heartburn, a hoarse voice and nausea. He reports no chest pain, no choking, no  coughing, no sore throat, no stridor, no tooth decay, no water brash or no wheezing. This is a chronic problem. The current episode started more than 1 year ago. The problem occurs frequently. The problem has been waxing and waning. The heartburn is located in the substernum. The heartburn is of moderate intensity. The heartburn wakes him from sleep. The heartburn limits his activity. The heartburn changes with position. The symptoms are aggravated by certain foods, exertion and lying down. Associated symptoms include fatigue and weight loss. Pertinent negatives include no anemia. He has tried head elevation, a diet change and an antacid for the symptoms. The treatment provided mild relief.     Plan Continue dietary modifications. EGD due to nausea, weight loss and reflux symptoms.  I discussed starting PPI however patient states dietary modifications and times has significantly improved symptoms would refer to wait after procedure to start medication.   The following portions of the patient's history were reviewed and updated as appropriate:   Past Medical History:   Diagnosis Date   • Coronary arteriosclerosis    • Degenerative joint disease involving multiple joints    • Hypertension    • Plantar fasciitis    • Upper respiratory infection      Past Surgical History:   Procedure Laterality Date   • ANGIOPLASTY  12/17/2012    x 1 stent   • COLONOSCOPY     • INJECTION OF MEDICATION  04/03/2013    Inj(s) Tend-Sheath, Ligament, Single 59872 (1)       Family History   Problem Relation Age of Onset   • Alzheimer's disease Mother    • Hypertension Mother    • Colon cancer Father    • Hypertension Father    • Stroke Other      uncle   • Cancer Brother        Current Outpatient Prescriptions   Medication Sig Dispense Refill   • aspirin 81 MG EC tablet Take 81 mg by mouth Daily.     • atorvastatin (LIPITOR) 80 MG tablet Take 80 mg by mouth.     • Cholecalciferol 2000 UNITS tablet Take 2,000 Units by mouth.     • clopidogrel  (PLAVIX) 75 MG tablet Take 75 mg by mouth.     • enalapril (VASOTEC) 20 MG tablet Take 20 mg by mouth.     • Flaxseed, Linseed, (FLAXSEED OIL PO) Take  by mouth.     • fluticasone (FLONASE) 50 MCG/ACT nasal spray 2 sprays into each nostril Daily. 1 bottle 0   • hydrochlorothiazide (HYDRODIURIL) 12.5 MG tablet Take 12.5 mg by mouth.     • isosorbide dinitrate (ISORDIL) 30 MG tablet Take 30 mg by mouth.     • meclizine (ANTIVERT) 25 MG tablet Take 1 tablet by mouth 3 (Three) Times a Day As Needed for dizziness. 30 tablet 2   • nitroglycerin (NITROSTAT) 0.4 MG SL tablet Place 0.4 mg under the tongue Every 5 (Five) Minutes.     • Omega-3 Fatty Acids (FISH OIL PO) Take 1 capsule by mouth.     • ondansetron (ZOFRAN) 4 MG tablet Take 4 mg by mouth Every 8 (Eight) Hours As Needed for Nausea or Vomiting.     • predniSONE (DELTASONE) 20 MG tablet Take 1 tablet by mouth 2 (Two) Times a Day. 10 tablet 0   • triamterene-hydrochlorothiazide (MAXZIDE-25) 37.5-25 MG per tablet Take 1/2 tablet PO QD 30 tablet 0     No current facility-administered medications for this visit.      No Known Allergies  Social History     Social History   • Marital status: Single     Social History Main Topics   • Smoking status: Former Smoker   • Smokeless tobacco: Never Used   • Alcohol use No   • Drug use: No   • Sexual activity: Defer     Other Topics Concern   • Not on file       Review of Systems  Review of Systems   Constitutional: Positive for fatigue and weight loss. Negative for activity change, appetite change, chills, diaphoresis, fever and unexpected weight change.   HENT: Positive for hoarse voice. Negative for congestion, sore throat and trouble swallowing.    Respiratory: Negative for cough, choking, shortness of breath and wheezing.    Cardiovascular: Negative for chest pain.   Gastrointestinal: Positive for abdominal pain (acid reflux frequently, stomaching burning then comes up. He eats spicy food frequently ), anorexia (decreased  "appetite down 13 pounds), heartburn, nausea and vomiting (acidic material ). Negative for abdominal distention, anal bleeding, blood in stool, change in bowel habit, constipation, diarrhea and rectal pain.   Musculoskeletal: Positive for neck pain. Negative for arthralgias, joint swelling and myalgias.   Skin: Negative for pallor and rash.   Neurological: Positive for weakness and numbness (in fingers at times ). Negative for vertigo, light-headedness and headaches.        /74   Pulse 51   Ht 177.8 cm (70\")   Wt 78.6 kg (173 lb 3.2 oz)   BMI 24.85 kg/m²     Objective    Physical Exam   Constitutional: He is oriented to person, place, and time. He appears well-developed and well-nourished. He is cooperative. No distress.   HENT:   Head: Normocephalic and atraumatic.   Neck: Normal range of motion. Neck supple. No thyromegaly present.   Cardiovascular: Normal rate, regular rhythm and normal heart sounds.    Pulmonary/Chest: Effort normal and breath sounds normal. He has no wheezes. He has no rhonchi. He has no rales.   Abdominal: Soft. Normal appearance and bowel sounds are normal. He exhibits no distension. There is no hepatosplenomegaly. There is no tenderness. There is no rigidity and no guarding. No hernia.   Lymphadenopathy:     He has no cervical adenopathy.   Neurological: He is alert and oriented to person, place, and time.   Skin: Skin is warm, dry and intact. No rash noted. No pallor.   Psychiatric: He has a normal mood and affect. His speech is normal.     Lab on 04/10/2018   Component Date Value Ref Range Status   • Glucose 04/11/2018 97  70 - 100 mg/dL Final   • BUN 04/11/2018 15  8 - 25 mg/dL Final   • Creatinine 04/11/2018 1.00  0.40 - 1.30 mg/dL Final   • Sodium 04/11/2018 139  134 - 146 mmol/L Final   • Potassium 04/11/2018 4.0  3.4 - 5.4 mmol/L Final   • Chloride 04/11/2018 102  100 - 112 mmol/L Final   • CO2 04/11/2018 28.0  20.0 - 32.0 mmol/L Final   • Calcium 04/11/2018 9.0  8.4 - 10.8 " mg/dL Final   • eGFR Non African Amer 04/11/2018 73  42 - 98 mL/min/1.73 Final   • BUN/Creatinine Ratio 04/11/2018 15.0  7.0 - 25.0 Final   • Anion Gap 04/11/2018 9.0  5.0 - 15.0 mmol/L Final     Assessment/Plan      1. Bilious vomiting with nausea    2. Gastroesophageal reflux disease, esophagitis presence not specified    3. Weight loss    .       Orders placed during this encounter include:      ESOPHAGOGASTRODUODENOSCOPY possible dilation (N/A)    Review and/or summary of lab tests, radiology, procedures, medications. Review and summary of old records and obtaining of history. The risks and benefits of my recommendations, as well as other treatment options were discussed with the patient today. Questions were answered.    No orders of the defined types were placed in this encounter.      Follow-up: Return for Recheck after EGD .          This document has been electronically signed by VONDA Pearson on April 20, 2018 9:21 AM             Results for orders placed or performed in visit on 04/10/18   Basic Metabolic Panel   Result Value Ref Range    Glucose 97 70 - 100 mg/dL    BUN 15 8 - 25 mg/dL    Creatinine 1.00 0.40 - 1.30 mg/dL    Sodium 139 134 - 146 mmol/L    Potassium 4.0 3.4 - 5.4 mmol/L    Chloride 102 100 - 112 mmol/L    CO2 28.0 20.0 - 32.0 mmol/L    Calcium 9.0 8.4 - 10.8 mg/dL    eGFR Non African Amer 73 42 - 98 mL/min/1.73    BUN/Creatinine Ratio 15.0 7.0 - 25.0    Anion Gap 9.0 5.0 - 15.0 mmol/L   Results for orders placed or performed during the hospital encounter of 08/24/16   PSA   Result Value Ref Range    PSA 2.78 0.00 - 4.00 ng/ml   Results for orders placed or performed during the hospital encounter of 10/12/15   D-dimer, quantitative   Result Value Ref Range    D-Dimer, Quant <100 0 - 399 ng/ml   CBC and Differential   Result Value Ref Range    WBC 8.5 3.2 - 9.8 x1000/uL    RBC 5.05 4.37 - 5.74 wilmar/mm3    Hemoglobin 15.8 13.7 - 17.3 gm/dl    Hematocrit 47.1 39.0 - 49.0 %    MCV 93.3  80.0 - 98.0 fl    MCH 31.3 26.0 - 34.0 pg    MCHC 33.5 31.5 - 36.3 gm/dl    Platelets 193 150 - 450 x1000/mm3    RDW 13.4 11.5 - 14.5 %    MPV 11.3 8.0 - 12.0 fl    Neutrophil Rel % 60.1 37.0 - 80.0 %    Lymphocyte Rel % 30.0 10.0 - 50.0 %    Monocyte Rel % 8.6 0.0 - 12.0 %    Eosinophil Rel % 0.9 0.0 - 7.0 %    Basophil Rel % 0.4 0.0 - 2.0 %    nRBC 0     nRBC 0    Comprehensive metabolic panel   Result Value Ref Range    Glucose 102 (H) 70.0 - 100.0 mg/dl    BUN 14 8.0 - 25.0 mg/dl    Creatinine 0.9 0.4 - 1.3 mg/dl    Sodium 140.0 134 - 146 mmol/L    Potassium 3.8 3.4 - 5.4 mmol/L    Chloride 104.0 100.0 - 112.0 mmol/L    CO2 30.0 20.0 - 32.0 mmol/L    Calcium 9.4 8.4 - 10.8 mg/dl    Total Protein 6.9 6.7 - 8.2 gm/dl    Albumin 4.1 3.2 - 5.5 gm/dl    Total Bilirubin 0.7 0.2 - 1.0 mg/dl    Alkaline Phosphatase 75 15 - 121 U/L    ALT (SGPT) 25 10 - 60 U/L    AST (SGOT) 26 10 - 60 U/L    GFR MDRD Non African American 83 42 - 98 mL/min/1.73 sq.M    GFR MDRD  100 (H) 42 - 98 mL/min/1.73 sq.M    Anion Gap 6.0 5.0 - 15.0 mmol/L   Results for orders placed or performed in visit on 08/27/15   PSA   Result Value Ref Range    PSA 3.02 0.00 - 4.00 ng/ml   Results for orders placed or performed in visit on 08/14/14   PSA   Result Value Ref Range    PSA 2.38 0.00 - 4.00 ng/ml

## 2018-05-14 LAB
LAB AP CASE REPORT: NORMAL
Lab: NORMAL
PATH REPORT.FINAL DX SPEC: NORMAL
PATH REPORT.GROSS SPEC: NORMAL

## 2018-05-21 ENCOUNTER — OFFICE VISIT (OUTPATIENT)
Dept: GASTROENTEROLOGY | Facility: CLINIC | Age: 78
End: 2018-05-21

## 2018-05-21 VITALS
SYSTOLIC BLOOD PRESSURE: 116 MMHG | WEIGHT: 172 LBS | BODY MASS INDEX: 24.62 KG/M2 | HEART RATE: 60 BPM | DIASTOLIC BLOOD PRESSURE: 64 MMHG | HEIGHT: 70 IN

## 2018-05-21 DIAGNOSIS — K44.9 HIATAL HERNIA: ICD-10-CM

## 2018-05-21 DIAGNOSIS — K21.00 GASTROESOPHAGEAL REFLUX DISEASE WITH ESOPHAGITIS: Primary | ICD-10-CM

## 2018-05-21 PROCEDURE — 99214 OFFICE O/P EST MOD 30 MIN: CPT | Performed by: NURSE PRACTITIONER

## 2018-05-21 RX ORDER — OMEPRAZOLE 20 MG/1
20 CAPSULE, DELAYED RELEASE ORAL DAILY
Qty: 30 CAPSULE | Refills: 11 | Status: SHIPPED | OUTPATIENT
Start: 2018-05-21 | End: 2018-06-30 | Stop reason: ALTCHOICE

## 2018-05-21 NOTE — PATIENT INSTRUCTIONS
MyPlate from [employer]  The general, healthful diet is based on the 2010 Dietary Guidelines for Americans. The amount of food you need to eat from each food group depends on your age, sex, and level of physical activity and can be individualized by a dietitian. Go to ChooseMyPlate.gov for more information.  What do I need to know about the MyPlate plan?  · Enjoy your food, but eat less.  · Avoid oversized portions.  ¨ ½ of your plate should include fruits and vegetables.  ¨ ¼ of your plate should be grains.  ¨ ¼ of your plate should be protein.  Grains   · Make at least half of your grains whole grains.  · For a 2,000 calorie daily food plan, eat 6 oz every day.  · 1 oz is about 1 slice bread, 1 cup cereal, or ½ cup cooked rice, cereal, or pasta.  Vegetables   · Make half your plate fruits and vegetables.  · For a 2,000 calorie daily food plan, eat 2½ cups every day.  · 1 cup is about 1 cup raw or cooked vegetables or vegetable juice or 2 cups raw leafy greens.  Fruits   · Make half your plate fruits and vegetables.  · For a 2,000 calorie daily food plan, eat 2 cups every day.  · 1 cup is about 1 cup fruit or 100% fruit juice or ½ cup dried fruit.  Protein   · For a 2,000 calorie daily food plan, eat 5½ oz every day.  · 1 oz is about 1 oz meat, poultry, or fish, ¼ cup cooked beans, 1 egg, 1 Tbsp peanut butter, or ½ oz nuts or seeds.  Dairy   · Switch to fat-free or low-fat (1%) milk.  · For a 2,000 calorie daily food plan, eat 3 cups every day.  · 1 cup is about 1 cup milk or yogurt or soy milk (soy beverage), 1½ oz natural cheese, or 2 oz processed cheese.  Fats, Oils, and Empty Calories   · Only small amounts of oils are recommended.  · Empty calories are calories from solid fats or added sugars.  · Compare sodium in foods like soup, bread, and frozen meals. Choose the foods with lower numbers.  · Drink water instead of sugary drinks.  What foods can I eat?  Grains   Whole grains such as whole wheat, quinoa, millet,  and bulgur. Bread, rolls, and pasta made from whole grains. Brown or wild rice. Hot or cold cereals made from whole grains and without added sugar.  Vegetables   All fresh vegetables, especially fresh red, dark green, or orange vegetables. Peas and beans. Low-sodium frozen or canned vegetables prepared without added salt. Low-sodium vegetable juices.  Fruits   All fresh, frozen, and dried fruits. Canned fruit packed in water or fruit juice without added sugar. Fruit juices without added sugar.  Meats and Other Protein Sources   Boiled, baked, or grilled lean meat trimmed of fat. Skinless poultry. Fresh seafood and shellfish. Canned seafood packed in water. Unsalted nuts and unsalted nut butters. Tofu. Dried beans and pea. Eggs.  Dairy   Low-fat or fat-free milk, yogurt, and cheeses.  Sweets and Desserts   Frozen desserts made from low-fat milk.  Fats and Oils   Olive, peanut, and canola oils and margarine. Salad dressing and mayonnaise made from these oils.  Other   Soups and casseroles made from allowed ingredients and without added fat or salt.  The items listed above may not be a complete list of recommended foods or beverages. Contact your dietitian for more options.   What foods are not recommended?  Grains   Sweetened, low-fiber cereals. Packaged baked goods. Snack crackers and chips. Cheese crackers, butter crackers, and biscuits. Frozen waffles, sweet breads, doughnuts, pastries, packaged baking mixes, pancakes, cakes, and cookies.  Vegetables   Regular canned or frozen vegetables or vegetables prepared with salt. Canned tomatoes. Canned tomato sauce. Fried vegetables. Vegetables in cream sauce or cheese sauce.  Fruits   Fruits packed in syrup or made with added sugar.  Meats and Other Protein Sources   Marbled or fatty meats such as ribs. Poultry with skin. Fried meats, poultry, eggs, or fish. Sausages, hot dogs, and deli meats such as pastrami, bologna, or salami.  Dairy   Whole milk, cream, cheeses made  from whole milk, sour cream. Ice cream or yogurt made from whole milk or with added sugar.  Beverages   For adults, no more than one alcoholic drink per day. Regular soft drinks or other sugary beverages. Juice drinks.  Sweets and Desserts   Sugary or fatty desserts, candy, and other sweets.  Fats and Oils   Solid shortening or partially hydrogenated oils. Solid margarine. Margarine that contains trans fats. Butter.  The items listed above may not be a complete list of foods and beverages to avoid. Contact your dietitian for more information.   This information is not intended to replace advice given to you by your health care provider. Make sure you discuss any questions you have with your health care provider.  Document Released: 01/06/2009 Document Revised: 05/25/2017 Document Reviewed: 11/26/2014  [email or web address] Interactive Patient Education © 2017 [email or web address]  BMI for Adults  Body mass index (BMI) is a number that is calculated from a person's weight and height. In most adults, the number is used to find how much of an adult's weight is made up of fat. BMI is not as accurate as a direct measure of body fat.  How is BMI calculated?  BMI is calculated by dividing weight in kilograms by height in meters squared. It can also be calculated by dividing weight in pounds by height in inches squared, then multiplying the resulting number by 703. Charts are available to help you find your BMI quickly and easily without doing this calculation.  How is BMI interpreted?  Health care professionals use BMI charts to identify whether an adult is underweight, at a normal weight, or overweight based on the following guidelines:  · Underweight: BMI less than 18.5.  · Normal weight: BMI between 18.5 and 24.9.  · Overweight: BMI between 25 and 29.9.  · Obese: BMI of 30 and above.  BMI is usually interpreted the same for males and females.  Weight includes both fat and muscle, so someone with a muscular build, such as an athlete, may  have a BMI that is higher than 24.9. In cases like these, BMI may not accurately depict body fat. To determine if excess body fat is the cause of a BMI of 25 or higher, further assessments may need to be done by a health care provider.  Why is BMI a useful tool?  BMI is used to identify a possible weight problem that may be related to a medical problem or may increase the risk for medical problems. BMI can also be used to promote changes to reach a healthy weight.  This information is not intended to replace advice given to you by your health care provider. Make sure you discuss any questions you have with your health care   Hiatal Hernia  A hiatal hernia occurs when part of the stomach slides above the muscle that separates the abdomen from the chest (diaphragm). A person can be born with a hiatal hernia (congenital), or it may develop over time. In almost all cases of hiatal hernia, only the top part of the stomach pushes through the diaphragm.  Many people have a hiatal hernia with no symptoms. The larger the hernia, the more likely it is that you will have symptoms. In some cases, a hiatal hernia allows stomach acid to flow back into the tube that carries food from your mouth to your stomach (esophagus). This may cause heartburn symptoms. Severe heartburn symptoms may mean that you have developed a condition called gastroesophageal reflux disease (GERD).  What are the causes?  This condition is caused by a weakness in the opening (hiatus) where the esophagus passes through the diaphragm to attach to the upper part of the stomach. A person may be born with a weakness in the hiatus, or a weakness can develop over time.  What increases the risk?  This condition is more likely to develop in:  · Older people. Age is a major risk factor for a hiatal hernia, especially if you are over the age of 50.  · Pregnant women.  · People who are overweight.  · People who have frequent constipation.  What are the signs or  symptoms?  Symptoms of this condition usually develop in the form of GERD symptoms. Symptoms include:  · Heartburn.  · Belching.  · Indigestion.  · Trouble swallowing.  · Coughing or wheezing.  · Sore throat.  · Hoarseness.  · Chest pain.  · Nausea and vomiting.  How is this diagnosed?  This condition may be diagnosed during testing for GERD. Tests that may be done include:  · X-rays of your stomach or chest.  · An upper gastrointestinal (GI) series. This is an X-ray exam of your GI tract that is taken after you swallow a chalky liquid that shows up clearly on the X-ray.  · Endoscopy. This is a procedure to look into your stomach using a thin, flexible tube that has a tiny camera and light on the end of it.  How is this treated?  This condition may be treated by:  · Dietary and lifestyle changes to help reduce GERD symptoms.  · Medicines. These may include:  ¨ Over-the-counter antacids.  ¨ Medicines that make your stomach empty more quickly.  ¨ Medicines that block the production of stomach acid (H2 blockers).  ¨ Stronger medicines to reduce stomach acid (proton pump inhibitors).  · Surgery to repair the hernia, if other treatments are not helping.  If you have no symptoms, you may not need treatment.  Follow these instructions at home:  Lifestyle and activity   · Do not use any products that contain nicotine or tobacco, such as cigarettes and e-cigarettes. If you need help quitting, ask your health care provider.  · Try to achieve and maintain a healthy body weight.  · Avoid putting pressure on your abdomen. Anything that puts pressure on your abdomen increases the amount of acid that may be pushed up into your esophagus.  ¨ Avoid bending over, especially after eating.  ¨ Raise the head of your bed by putting blocks under the legs. This keeps your head and esophagus higher than your stomach.  ¨ Do not wear tight clothing around your chest or stomach.  ¨ Try not to strain when having a bowel movement, when  urinating, or when lifting heavy objects.  Eating and drinking   · Avoid foods that can worsen GERD symptoms. These may include:  ¨ Fatty foods, like fried foods.  ¨ Citrus fruits, like oranges or lemon.  ¨ Other foods and drinks that contain acid, like orange juice or tomatoes.  ¨ Spicy food.  ¨ Chocolate.  · Eat frequent small meals instead of three large meals a day. This helps prevent your stomach from getting too full.  ¨ Eat slowly.  ¨ Do not lie down right after eating.  ¨ Do not eat 1-2 hours before bed.  · Do not drink beverages with caffeine. These include cola, coffee, cocoa, and tea.  · Do not drink alcohol.  General instructions   · Take over-the-counter and prescription medicines only as told by your health care provider.  · Keep all follow-up visits as told by your health care provider. This is important.  Contact a health care provider if:  · Your symptoms are not controlled with medicines or lifestyle changes.  · You are having trouble swallowing.  · You have coughing or wheezing that will not go away.  Get help right away if:  · Your pain is getting worse.  · Your pain spreads to your arms, neck, jaw, teeth, or back.  · You have shortness of breath.  · You sweat for no reason.  · You feel sick to your stomach (nauseous) or you vomit.  · You vomit blood.  · You have bright red blood in your stools.  · You have black, tarry stools.  This information is not intended to replace advice given to you by your health care provider. Make sure you discuss any questions you have with your health care provider.  Document Released: 03/09/2005 Document Revised: 12/11/2017 Document Reviewed: 12/11/2017  [employer] Interactive Patient Education © 2017 [employer]    Food Choices for Gastroesophageal Reflux Disease, Adult  When you have gastroesophageal reflux disease (GERD), the foods you eat and your eating habits are very important. Choosing the right foods can help ease your discomfort.  What guidelines do I  need to follow?  · Choose fruits, vegetables, whole grains, and low-fat dairy products.  · Choose low-fat meat, fish, and poultry.  · Limit fats such as oils, salad dressings, butter, nuts, and avocado.  · Keep a food diary. This helps you identify foods that cause symptoms.  · Avoid foods that cause symptoms. These may be different for everyone.  · Eat small meals often instead of 3 large meals a day.  · Eat your meals slowly, in a place where you are relaxed.  · Limit fried foods.  · Cook foods using methods other than frying.  · Avoid drinking alcohol.  · Avoid drinking large amounts of liquids with your meals.  · Avoid bending over or lying down until 2-3 hours after eating.  What foods are not recommended?  These are some foods and drinks that may make your symptoms worse:  Vegetables   Tomatoes. Tomato juice. Tomato and spaghetti sauce. Chili peppers. Onion and garlic. Horseradish.  Fruits   Oranges, grapefruit, and lemon (fruit and juice).  Meats   High-fat meats, fish, and poultry. This includes hot dogs, ribs, ham, sausage, salami, and yoon.  Dairy   Whole milk and chocolate milk. Sour cream. Cream. Butter. Ice cream. Cream cheese.  Drinks   Coffee and tea. Bubbly (carbonated) drinks or energy drinks.  Condiments   Hot sauce. Barbecue sauce.  Sweets/Desserts   Chocolate and cocoa. Donuts. Peppermint and spearmint.  Fats and Oils   High-fat foods. This includes French fries and potato chips.  Other   Vinegar. Strong spices. This includes black pepper, white pepper, red pepper, cayenne, connelly powder, cloves, ginger, and chili powder.  The items listed above may not be a complete list of foods and drinks to avoid. Contact your dietitian for more information.   This information is not intended to replace advice given to you by your health care provider. Make sure you discuss any questions you have with your health care provider.  Document Released: 06/18/2013 Document Revised: 05/25/2017 Document Reviewed:  10/22/2014  [employer] Interactive Patient Education © 2017 [employer]  provider.  Document Released: 08/29/2005 Document Revised: 04/27/2017 Document Reviewed: 05/15/2015  [employer] Interactive Patient Education © 2017 [employer]

## 2018-05-21 NOTE — PROGRESS NOTES
Chief Complaint   Patient presents with   • EGD     results   • Abdominal Pain       Subjective    Steve Bethea is a 77 y.o. male. he is here today for follow-up.  77-year-old male presents to discuss EGD results.  He denies any abdominal pain nausea or vomiting.  States refluxIs very mild with his dietary improvements.  Avoids all gastric irritants.  He still has occasional symptoms.  EGD was completed 5/11/18 which noted mildly severe esophagitis, gastritis normal duodenum and a hiatal hernia.  Duodenal biopsy noted no significant histologic abnormality.  Antrum of stomach biopsy noted reactive gastropathy.  Distal esophagus biopsy noted reactive changes of squamous mucosa.  He also brings colonoscopy report from Dr. Gay which states minimal diverticulosis otherwise normal with repeat recommended in 5 years date on colonoscopy was 1/6/15.    Heartburn   He complains of heartburn (rare with dietary changes ). He reports no abdominal pain, no belching, no chest pain, no choking, no coughing, no dysphagia, no early satiety, no globus sensation, no hoarse voice, no nausea or no sore throat. This is a chronic problem. The problem occurs rarely. The problem has been waxing and waning. The heartburn is located in the substernum. The heartburn is of mild intensity. The symptoms are aggravated by certain foods. Pertinent negatives include no anemia or fatigue. Risk factors include caffeine use. He has tried a diet change for the symptoms.     Plan; we will start patient on Prilosec daily.  Follow-up in 6 months return to office sooner if needed. Repeat colon 1/2020  for surveillance.        The following portions of the patient's history were reviewed and updated as appropriate:   Past Medical History:   Diagnosis Date   • Coronary arteriosclerosis    • Degenerative joint disease involving multiple joints    • Hypertension    • Plantar fasciitis    • Upper respiratory infection      Past Surgical History:    Procedure Laterality Date   • ANGIOPLASTY  12/17/2012    x 1 stent   • COLONOSCOPY     • ENDOSCOPY N/A 5/11/2018    Procedure: ESOPHAGOGASTRODUODENOSCOPY possible dilation;  Surgeon: Praneeth Ignacio MD;  Location: [facility] ENDOSCOPY;  Service: Gastroenterology   • INJECTION OF MEDICATION  04/03/2013    Inj(s) Tend-Sheath, Ligament, Single 26382 (1)       Family History   Problem Relation Age of Onset   • Alzheimer's disease Mother    • Hypertension Mother    • Colon cancer Father    • Hypertension Father    • Stroke Other         uncle   • Cancer Brother        Current Outpatient Prescriptions   Medication Sig Dispense Refill   • aspirin 81 MG EC tablet Take 81 mg by mouth Daily.     • atorvastatin (LIPITOR) 80 MG tablet Take 80 mg by mouth.     • Cholecalciferol 2000 UNITS tablet Take 2,000 Units by mouth.     • clopidogrel (PLAVIX) 75 MG tablet Take 75 mg by mouth.     • enalapril (VASOTEC) 20 MG tablet Take 20 mg by mouth.     • Flaxseed, Linseed, (FLAXSEED OIL PO) Take  by mouth.     • fluticasone (FLONASE) 50 MCG/ACT nasal spray 2 sprays into each nostril Daily. 1 bottle 0   • hydrochlorothiazide (HYDRODIURIL) 12.5 MG tablet Take 12.5 mg by mouth.     • isosorbide dinitrate (ISORDIL) 30 MG tablet Take 30 mg by mouth.     • meclizine (ANTIVERT) 25 MG tablet Take 1 tablet by mouth 3 (Three) Times a Day As Needed for dizziness. 30 tablet 5   • nitroglycerin (NITROSTAT) 0.4 MG SL tablet Place 0.4 mg under the tongue Every 5 (Five) Minutes.     • Omega-3 Fatty Acids (FISH OIL PO) Take 1 capsule by mouth.     • ondansetron (ZOFRAN) 4 MG tablet Take 4 mg by mouth Every 8 (Eight) Hours As Needed for Nausea or Vomiting.     • triamterene-hydrochlorothiazide (MAXZIDE-25) 37.5-25 MG per tablet Take 1/2 tablet PO QD (Patient taking differently: Take 0.5 tablets by mouth. Take 1/2 tablet PO QD) 30 tablet 0   • omeprazole (priLOSEC) 20 MG capsule Take 1 capsule by mouth Daily. 30 capsule 11     No current  "facility-administered medications for this visit.      No Known Allergies  Social History     Social History   • Marital status: Single     Social History Main Topics   • Smoking status: Former Smoker   • Smokeless tobacco: Never Used   • Alcohol use No   • Drug use: No   • Sexual activity: Defer     Other Topics Concern   • Not on file       Review of Systems  Review of Systems   Constitutional: Negative for activity change, appetite change, chills, diaphoresis, fatigue and unexpected weight change.   HENT: Negative for hoarse voice, sore throat and trouble swallowing.    Respiratory: Negative for cough, choking and shortness of breath.    Cardiovascular: Negative for chest pain.   Gastrointestinal: Positive for abdominal distention and heartburn (rare with dietary changes ). Negative for abdominal pain, anal bleeding, blood in stool, dysphagia, nausea and rectal pain.   Skin: Negative for pallor.   Neurological: Negative for light-headedness.        /64   Pulse 60   Ht 177.8 cm (70\")   Wt 78 kg (172 lb)   BMI 24.68 kg/m²     Objective    Physical Exam   Constitutional: He is oriented to person, place, and time. He appears well-developed and well-nourished. He is cooperative. No distress.   HENT:   Head: Normocephalic and atraumatic.   Neck: Normal range of motion. Neck supple. No thyromegaly present.   Cardiovascular: Normal rate, regular rhythm and normal heart sounds.    Pulmonary/Chest: Effort normal and breath sounds normal. He has no wheezes. He has no rhonchi. He has no rales.   Abdominal: Soft. Normal appearance and bowel sounds are normal. He exhibits no distension. There is no hepatosplenomegaly. There is no tenderness. There is no rigidity and no guarding. No hernia.   Lymphadenopathy:     He has no cervical adenopathy.   Neurological: He is alert and oriented to person, place, and time.   Skin: Skin is warm, dry and intact. No rash noted. No pallor.   Psychiatric: He has a normal mood and " affect. His speech is normal.     Admission on 05/11/2018, Discharged on 05/11/2018   Component Date Value Ref Range Status   • Case Report 05/11/2018    Final                    Value:Surgical Pathology Report                         Case: [id number]                                  Authorizing Provider:  Praneeth Ignacio MD         Collected:           05/11/2018 12:49 PM          Ordering Location:     [facility]             Received:            05/11/2018 01:14 PM                                 [place] [facility]                                                     Pathologist:           Chencho Bronson MD                                                          Specimens:   1) - Small Intestine, Duodenum                                                                      2) - Gastric, Antrum                                                                                3) - Esophagus, Distal                                                                    • Final Diagnosis 05/11/2018    Final                    Value:This result contains rich text formatting which cannot be displayed here.   • Gross Description 05/11/2018    Final                    Value:This result contains rich text formatting which cannot be displayed here.     Assessment/Plan      1. Gastroesophageal reflux disease with esophagitis    2. Hiatal hernia    .       Orders placed during this encounter include:  No orders of the defined types were placed in this encounter.      * Surgery not found *    Review and/or summary of lab tests, radiology, procedures, medications. Review and summary of old records and obtaining of history. The risks and benefits of my recommendations, as well as other treatment options were discussed with the patient today. Questions were answered.    New Medications Ordered This Visit   Medications   • omeprazole (priLOSEC) 20 MG capsule     Sig: Take 1 capsule by mouth Daily.     Dispense:  30 capsule      Refill:  11       Follow-up: Return in about 6 months (around 11/21/2018).          This document has been electronically signed by VONDA Pearson on May 21, 2018 2:40 PM             Results for orders placed or performed during the hospital encounter of 05/11/18   Tissue Pathology Exam   Result Value Ref Range    Case Report       Surgical Pathology Report                         Case: [id number]                                  Authorizing Provider:  Praneeth Ignacio MD         Collected:           05/11/2018 12:49 PM          Ordering Location:     [facility]             Received:            05/11/2018 01:14 PM                                 [place] [facility]                                                     Pathologist:           Chencho Bronson MD                                                          Specimens:   1) - Small Intestine, Duodenum                                                                      2) - Gastric, Antrum                                                                                3) - Esophagus, Distal                                                                     Final Diagnosis       1.  MUCOSA, DUODENUM:   NO SIGNIFICANT HISTOLOGIC ABNORMALITY.      2.  MUCOSA, ANTRUM OF STOMACH:   REACTIVE GASTROPATHY.     3.  MUCOSA, DISTAL ESOPHAGUS:   REACTIVE CHANGE OF SQUAMOUS MUCOSA.       Gross Description       Received for examination are 3 containers, each of which have nodular bits of white soft tissue measuring 0.3-0.5 cc in aggregate.  All specimens are embedded as labeled:  1A duodenum; 2A antrum of stomach; 3A distal esophagus.       Embedded Images     Results for orders placed or performed in visit on 04/10/18   Basic Metabolic Panel   Result Value Ref Range    Glucose 97 70 - 100 mg/dL    BUN 15 8 - 25 mg/dL    Creatinine 1.00 0.40 - 1.30 mg/dL    Sodium 139 134 - 146 mmol/L    Potassium 4.0 3.4 - 5.4 mmol/L    Chloride 102 100 - 112 mmol/L    CO2  28.0 20.0 - 32.0 mmol/L    Calcium 9.0 8.4 - 10.8 mg/dL    eGFR Non African Amer 73 42 - 98 mL/min/1.73    BUN/Creatinine Ratio 15.0 7.0 - 25.0    Anion Gap 9.0 5.0 - 15.0 mmol/L   Results for orders placed or performed during the hospital encounter of 08/24/16   PSA   Result Value Ref Range    PSA 2.78 0.00 - 4.00 ng/ml   Results for orders placed or performed during the hospital encounter of 10/12/15   D-dimer, quantitative   Result Value Ref Range    D-Dimer, Quant <100 0 - 399 ng/ml   CBC and Differential   Result Value Ref Range    WBC 8.5 3.2 - 9.8 x1000/uL    RBC 5.05 4.37 - 5.74 wilmar/mm3    Hemoglobin 15.8 13.7 - 17.3 gm/dl    Hematocrit 47.1 39.0 - 49.0 %    MCV 93.3 80.0 - 98.0 fl    MCH 31.3 26.0 - 34.0 pg    MCHC 33.5 31.5 - 36.3 gm/dl    Platelets 193 150 - 450 x1000/mm3    RDW 13.4 11.5 - 14.5 %    MPV 11.3 8.0 - 12.0 fl    Neutrophil Rel % 60.1 37.0 - 80.0 %    Lymphocyte Rel % 30.0 10.0 - 50.0 %    Monocyte Rel % 8.6 0.0 - 12.0 %    Eosinophil Rel % 0.9 0.0 - 7.0 %    Basophil Rel % 0.4 0.0 - 2.0 %    nRBC 0     nRBC 0    Comprehensive metabolic panel   Result Value Ref Range    Glucose 102 (H) 70.0 - 100.0 mg/dl    BUN 14 8.0 - 25.0 mg/dl    Creatinine 0.9 0.4 - 1.3 mg/dl    Sodium 140.0 134 - 146 mmol/L    Potassium 3.8 3.4 - 5.4 mmol/L    Chloride 104.0 100.0 - 112.0 mmol/L    CO2 30.0 20.0 - 32.0 mmol/L    Calcium 9.4 8.4 - 10.8 mg/dl    Total Protein 6.9 6.7 - 8.2 gm/dl    Albumin 4.1 3.2 - 5.5 gm/dl    Total Bilirubin 0.7 0.2 - 1.0 mg/dl    Alkaline Phosphatase 75 15 - 121 U/L    ALT (SGPT) 25 10 - 60 U/L    AST (SGOT) 26 10 - 60 U/L    GFR MDRD Non African American 83 42 - 98 mL/min/1.73 sq.M    GFR MDRD  100 (H) 42 - 98 mL/min/1.73 sq.M    Anion Gap 6.0 5.0 - 15.0 mmol/L   Results for orders placed or performed in visit on 08/27/15   PSA   Result Value Ref Range    PSA 3.02 0.00 - 4.00 ng/ml   Results for orders placed or performed in visit on 08/14/14   PSA   Result Value Ref  Range    PSA 2.38 0.00 - 4.00 ng/ml

## 2018-06-14 ENCOUNTER — OFFICE VISIT (OUTPATIENT)
Dept: FAMILY MEDICINE CLINIC | Facility: CLINIC | Age: 78
End: 2018-06-14

## 2018-06-14 VITALS
BODY MASS INDEX: 24.85 KG/M2 | TEMPERATURE: 99 F | DIASTOLIC BLOOD PRESSURE: 70 MMHG | SYSTOLIC BLOOD PRESSURE: 110 MMHG | HEART RATE: 85 BPM | WEIGHT: 173.6 LBS | HEIGHT: 70 IN | OXYGEN SATURATION: 96 %

## 2018-06-14 DIAGNOSIS — L82.1 SEBORRHEIC KERATOSES: ICD-10-CM

## 2018-06-14 DIAGNOSIS — J30.2 ACUTE SEASONAL ALLERGIC RHINITIS, UNSPECIFIED TRIGGER: Primary | ICD-10-CM

## 2018-06-14 PROCEDURE — 99213 OFFICE O/P EST LOW 20 MIN: CPT | Performed by: NURSE PRACTITIONER

## 2018-06-14 PROCEDURE — 96372 THER/PROPH/DIAG INJ SC/IM: CPT | Performed by: NURSE PRACTITIONER

## 2018-06-14 RX ORDER — TRIAMCINOLONE ACETONIDE 40 MG/ML
80 INJECTION, SUSPENSION INTRA-ARTICULAR; INTRAMUSCULAR ONCE
Status: COMPLETED | OUTPATIENT
Start: 2018-06-14 | End: 2018-06-14

## 2018-06-14 RX ADMIN — TRIAMCINOLONE ACETONIDE 80 MG: 40 INJECTION, SUSPENSION INTRA-ARTICULAR; INTRAMUSCULAR at 13:33

## 2018-06-14 NOTE — PROGRESS NOTES
Subjective   Steve Bethea is a 77 y.o. male. Patient here today with complaints of Sinusitis  pt here today with complaints of sore throat, cough, runny nose symptoms started last week, denies fever. Using nasal spray, chloreseptic without relief of symptoms. Reports colored nasal drg at first, not now. Has used warm salt water gargles , some help. Reports mole on back x unknown time. Changing in color, size. Area itches     Vitals:    06/14/18 1314   BP: 110/70   Pulse: 85   Temp: 99 °F (37.2 °C)   SpO2: 96%     Past Medical History:   Diagnosis Date   • Coronary arteriosclerosis    • Degenerative joint disease involving multiple joints    • Hypertension    • Plantar fasciitis    • Upper respiratory infection      URI    This is a new problem. The current episode started in the past 7 days. The problem has been unchanged. There has been no fever. Associated symptoms include congestion, coughing, rhinorrhea, sneezing and a sore throat. Pertinent negatives include no abdominal pain, chest pain, diarrhea, dysuria, ear pain, headaches, joint pain, joint swelling, nausea, neck pain, plugged ear sensation, rash, sinus pain, swollen glands, vomiting or wheezing. He has tried increased fluids (gargles, flonase nasal spray) for the symptoms. The treatment provided no relief.        The following portions of the patient's history were reviewed and updated as appropriate: allergies, current medications, past family history, past medical history, past social history, past surgical history and problem list.    Review of Systems   Constitutional: Negative.    HENT: Positive for congestion, rhinorrhea, sneezing and sore throat. Negative for ear pain and sinus pain.    Eyes: Negative.    Respiratory: Positive for cough. Negative for wheezing.    Cardiovascular: Negative.  Negative for chest pain.   Gastrointestinal: Negative.  Negative for abdominal pain, diarrhea, nausea and vomiting.   Endocrine: Negative.     Genitourinary: Negative.  Negative for dysuria.   Musculoskeletal: Negative.  Negative for joint pain and neck pain.   Skin: Negative.  Negative for rash.   Allergic/Immunologic: Negative.    Neurological: Negative.  Negative for headaches.   Hematological: Negative.    Psychiatric/Behavioral: Negative.        Objective   Physical Exam   Constitutional: He is oriented to person, place, and time. He appears well-developed and well-nourished. No distress.   HENT:   Head: Normocephalic and atraumatic.   Right Ear: External ear and ear canal normal. A middle ear effusion is present. Decreased hearing is noted.   Left Ear: External ear and ear canal normal. Tympanic membrane is bulging. Decreased hearing is noted.   Nose: Right sinus exhibits no maxillary sinus tenderness and no frontal sinus tenderness. Left sinus exhibits no maxillary sinus tenderness and no frontal sinus tenderness.   Mouth/Throat: Uvula is midline and mucous membranes are normal. Posterior oropharyngeal erythema present. No tonsillar exudate.   Chronically, sl [place]   Neck: Neck supple.   Cardiovascular: Normal rate, regular rhythm and normal heart sounds.  Exam reveals no gallop and no friction rub.    No murmur heard.  Pulmonary/Chest: Effort normal and breath sounds normal. No respiratory distress. He has no wheezes. He has no rales.   Lymphadenopathy:     He has no cervical adenopathy.   Neurological: He is alert and oriented to person, place, and time.   Skin: Skin is warm and dry. Lesion noted. No rash noted. He is not diaphoretic. No erythema. No pallor.        Stuck on appearance, SK, noted to mid upper back, not draining, not erythematous    Psychiatric: He has a normal mood and affect. His behavior is normal.   Nursing note and vitals reviewed.      Assessment/Plan   Steve was seen today for sinusitis.    Diagnoses and all orders for this visit:    Acute seasonal allergic rhinitis, unspecified trigger  -     triamcinolone acetonide  (KENALOG-40) injection 80 mg; Inject 2 mL into the shoulder, thigh, or buttocks 1 (One) Time.    Seborrheic keratoses  Comments:  mid, upper back     given kenalog inj as above  Advised to cont with flonsae, gargles with warm salt water  Referred to derm for removal of SK on back  He is aware and is in agreement to this plan  If symptoms persist or worsen advised to RTC for recheck  All questions and concerns are addressed with understanding noted.  Patient's Body mass index is 24.91 kg/m². BMI is WNL.

## 2018-06-20 DIAGNOSIS — M85.129: Primary | ICD-10-CM

## 2018-09-11 ENCOUNTER — OFFICE VISIT (OUTPATIENT)
Dept: FAMILY MEDICINE CLINIC | Facility: CLINIC | Age: 78
End: 2018-09-11

## 2018-09-11 VITALS
SYSTOLIC BLOOD PRESSURE: 112 MMHG | WEIGHT: 174 LBS | HEART RATE: 71 BPM | TEMPERATURE: 98.3 F | BODY MASS INDEX: 24.91 KG/M2 | HEIGHT: 70 IN | DIASTOLIC BLOOD PRESSURE: 52 MMHG

## 2018-09-11 DIAGNOSIS — Z00.00 MEDICARE ANNUAL WELLNESS VISIT, INITIAL: Primary | ICD-10-CM

## 2018-09-11 DIAGNOSIS — I10 ESSENTIAL HYPERTENSION: Chronic | ICD-10-CM

## 2018-09-11 PROCEDURE — G0438 PPPS, INITIAL VISIT: HCPCS | Performed by: NURSE PRACTITIONER

## 2018-09-11 PROCEDURE — 99213 OFFICE O/P EST LOW 20 MIN: CPT | Performed by: NURSE PRACTITIONER

## 2018-09-11 NOTE — PROGRESS NOTES
QUICK REFERENCE INFORMATION:  The ABCs of the Annual Wellness Visit    Initial Medicare Wellness Visit    HEALTH RISK ASSESSMENT    1940    Recent Hospitalizations:  No hospitalization(s) within the last year..        Current Medical Providers:  Patient Care Team:  Maria Teresa Torres APRN as PCP - General (Family Medicine)  James Pond MD as PCP - Claims Attributed        Smoking Status:  History   Smoking Status   • Former Smoker   Smokeless Tobacco   • Never Used       Alcohol Consumption:  History   Alcohol Use No       Depression Screen:   PHQ-2/PHQ-9 Depression Screening 9/11/2018   Little interest or pleasure in doing things 0   Feeling down, depressed, or hopeless 0   Total Score 0       Health Habits and Functional and Cognitive Screening:  Functional & Cognitive Status 9/11/2018   Do you have difficulty preparing food and eating? No   Do you have difficulty bathing yourself, getting dressed or grooming yourself? No   Do you have difficulty using the toilet? No   Do you have difficulty moving around from place to place? No   Do you have trouble with steps or getting out of a bed or a chair? No   In the past year have you fallen or experienced a near fall? No   Current Diet Well Balanced Diet   Dental Exam Up to date   Eye Exam Not up to date   Exercise (times per week) 7 times per week   Current Exercise Activities Include Walking   Do you need help using the phone?  No   Are you deaf or do you have serious difficulty hearing?  No   Do you need help with transportation? No   Do you need help shopping? No   Do you need help preparing meals?  No   Do you need help with housework?  No   Do you need help with laundry? No   Do you need help taking your medications? No   Do you need help managing money? No   Do you ever drive or ride in a car without wearing a seat belt? No   Have you felt unusual stress, anger or loneliness in the last month? No   Who do you live with? Alone   Have you been bothered in the  last four weeks by sexual problems? No   Do you have difficulty concentrating, remembering or making decisions? No           Does the patient have evidence of cognitive impairment? No    Asiprin use counseling: Taking ASA appropriately as indicated      Recent Lab Results:    Visual Acuity:  No exam data present    Age-appropriate Screening Schedule:  Refer to the list below for future screening recommendations based on patient's age, sex and/or medical conditions. Orders for these recommended tests are listed in the plan section. The patient has been provided with a written plan.    Health Maintenance   Topic Date Due   • PNEUMOCOCCAL VACCINES (65+ LOW/MEDIUM RISK) (2 of 2 - PCV13) 12/06/2013   • TDAP/TD VACCINES (1 - Tdap) 06/19/2015   • LIPID PANEL  05/15/2017   • ZOSTER VACCINE (2 of 2) 04/17/2018   • INFLUENZA VACCINE  08/01/2018   • COLONOSCOPY  05/11/2020        Subjective   History of Present Illness    Steve Bethea is a 77 y.o. male who presents for an Annual Wellness Visit.    The following portions of the patient's history were reviewed and updated as appropriate:   He  has a past medical history of Coronary arteriosclerosis; Degenerative joint disease involving multiple joints; Hypertension; Plantar fasciitis; and Upper respiratory infection.  He  does not have any pertinent problems on file.  He  has a past surgical history that includes Angioplasty (12/17/2012); Injection of Medication (04/03/2013); Colonoscopy; and Esophagogastroduodenoscopy (N/A, 5/11/2018).  His family history includes Alzheimer's disease in his mother; Cancer in his brother; Colon cancer in his father; Hypertension in his father and mother; Stroke in his other.  He  reports that he has quit smoking. He has never used smokeless tobacco. He reports that he does not drink alcohol or use drugs.  Current Outpatient Prescriptions   Medication Sig Dispense Refill   • aspirin 81 MG EC tablet Take 81 mg by mouth Daily.     •  atorvastatin (LIPITOR) 80 MG tablet Take 80 mg by mouth.     • Cholecalciferol 2000 UNITS tablet Take 2,000 Units by mouth.     • clopidogrel (PLAVIX) 75 MG tablet Take 75 mg by mouth.     • enalapril (VASOTEC) 20 MG tablet Take 20 mg by mouth.     • Flaxseed, Linseed, (FLAXSEED OIL PO) Take  by mouth.     • fluticasone (FLONASE) 50 MCG/ACT nasal spray 2 sprays into each nostril Daily. 1 bottle 0   • hydrochlorothiazide (HYDRODIURIL) 12.5 MG tablet Take 12.5 mg by mouth.     • isosorbide dinitrate (ISORDIL) 30 MG tablet Take 30 mg by mouth.     • meclizine (ANTIVERT) 25 MG tablet Take 1 tablet by mouth 3 (Three) Times a Day As Needed for dizziness. 30 tablet 5   • nitroglycerin (NITROSTAT) 0.4 MG SL tablet Place 0.4 mg under the tongue Every 5 (Five) Minutes.     • Omega-3 Fatty Acids (FISH OIL PO) Take 1 capsule by mouth.     • ondansetron (ZOFRAN) 4 MG tablet Take 4 mg by mouth Every 8 (Eight) Hours As Needed for Nausea or Vomiting.     • triamterene-hydrochlorothiazide (MAXZIDE-25) 37.5-25 MG per tablet Take 1/2 tablet PO QD (Patient taking differently: Take 0.5 tablets by mouth. Take 1/2 tablet PO QD) 30 tablet 0     No current facility-administered medications for this visit.      Current Outpatient Prescriptions on File Prior to Visit   Medication Sig   • aspirin 81 MG EC tablet Take 81 mg by mouth Daily.   • atorvastatin (LIPITOR) 80 MG tablet Take 80 mg by mouth.   • Cholecalciferol 2000 UNITS tablet Take 2,000 Units by mouth.   • clopidogrel (PLAVIX) 75 MG tablet Take 75 mg by mouth.   • enalapril (VASOTEC) 20 MG tablet Take 20 mg by mouth.   • Flaxseed, Linseed, (FLAXSEED OIL PO) Take  by mouth.   • fluticasone (FLONASE) 50 MCG/ACT nasal spray 2 sprays into each nostril Daily.   • hydrochlorothiazide (HYDRODIURIL) 12.5 MG tablet Take 12.5 mg by mouth.   • isosorbide dinitrate (ISORDIL) 30 MG tablet Take 30 mg by mouth.   • meclizine (ANTIVERT) 25 MG tablet Take 1 tablet by mouth 3 (Three) Times a Day As  Needed for dizziness.   • nitroglycerin (NITROSTAT) 0.4 MG SL tablet Place 0.4 mg under the tongue Every 5 (Five) Minutes.   • Omega-3 Fatty Acids (FISH OIL PO) Take 1 capsule by mouth.   • ondansetron (ZOFRAN) 4 MG tablet Take 4 mg by mouth Every 8 (Eight) Hours As Needed for Nausea or Vomiting.   • triamterene-hydrochlorothiazide (MAXZIDE-25) 37.5-25 MG per tablet Take 1/2 tablet PO QD (Patient taking differently: Take 0.5 tablets by mouth. Take 1/2 tablet PO QD)     No current facility-administered medications on file prior to visit.      He has No Known Allergies..    Outpatient Medications Prior to Visit   Medication Sig Dispense Refill   • aspirin 81 MG EC tablet Take 81 mg by mouth Daily.     • atorvastatin (LIPITOR) 80 MG tablet Take 80 mg by mouth.     • Cholecalciferol 2000 UNITS tablet Take 2,000 Units by mouth.     • clopidogrel (PLAVIX) 75 MG tablet Take 75 mg by mouth.     • enalapril (VASOTEC) 20 MG tablet Take 20 mg by mouth.     • Flaxseed, Linseed, (FLAXSEED OIL PO) Take  by mouth.     • fluticasone (FLONASE) 50 MCG/ACT nasal spray 2 sprays into each nostril Daily. 1 bottle 0   • hydrochlorothiazide (HYDRODIURIL) 12.5 MG tablet Take 12.5 mg by mouth.     • isosorbide dinitrate (ISORDIL) 30 MG tablet Take 30 mg by mouth.     • meclizine (ANTIVERT) 25 MG tablet Take 1 tablet by mouth 3 (Three) Times a Day As Needed for dizziness. 30 tablet 5   • nitroglycerin (NITROSTAT) 0.4 MG SL tablet Place 0.4 mg under the tongue Every 5 (Five) Minutes.     • Omega-3 Fatty Acids (FISH OIL PO) Take 1 capsule by mouth.     • ondansetron (ZOFRAN) 4 MG tablet Take 4 mg by mouth Every 8 (Eight) Hours As Needed for Nausea or Vomiting.     • triamterene-hydrochlorothiazide (MAXZIDE-25) 37.5-25 MG per tablet Take 1/2 tablet PO QD (Patient taking differently: Take 0.5 tablets by mouth. Take 1/2 tablet PO QD) 30 tablet 0     No facility-administered medications prior to visit.        Patient Active Problem List   Diagnosis  "  • Acute non-recurrent maxillary sinusitis   • Hypertension   • Coronary arteriosclerosis   • Weight loss   • Bilious vomiting with nausea   • Gastroesophageal reflux disease       Advance Care Planning:  has NO advance directive - not interested in additional information    Identification of Risk Factors:  Risk factors include: weight , unhealthy diet, cardiovascular risk, increased fall risk, depression, vision limitations, hearing limitations and polypharmacy.    Review of Systems    Compared to one year ago, the patient feels his physical health is the same.  Compared to one year ago, the patient feels his mental health is the same.    Objective     Physical Exam    Vitals:    09/11/18 1400   BP: 112/52   Pulse: 71   Temp: 98.3 °F (36.8 °C)   Weight: 78.9 kg (174 lb)   Height: 177.8 cm (70\")   PainSc: 0-No pain       Patient's Body mass index is 24.97 kg/m². BMI is within normal parameters. No follow-up required.      Assessment/Plan   Patient Self-Management and Personalized Health Advice  The patient has been provided with information about: diet, exercise, weight management, fall prevention, designing advance directives and mental health concerns and preventive services including:   · Advance directive, Exercise counseling provided, Fall Risk assessment done, Fall Risk plan of care done, Influenza vaccine, Nutrition counseling provided.    Visit Diagnoses:    ICD-10-CM ICD-9-CM   1. Medicare annual wellness visit, initial Z00.00 V70.0   2. Essential hypertension I10 401.9       No orders of the defined types were placed in this encounter.      Outpatient Encounter Prescriptions as of 9/11/2018   Medication Sig Dispense Refill   • aspirin 81 MG EC tablet Take 81 mg by mouth Daily.     • atorvastatin (LIPITOR) 80 MG tablet Take 80 mg by mouth.     • Cholecalciferol 2000 UNITS tablet Take 2,000 Units by mouth.     • clopidogrel (PLAVIX) 75 MG tablet Take 75 mg by mouth.     • enalapril (VASOTEC) 20 MG tablet " Take 20 mg by mouth.     • Flaxseed, Linseed, (FLAXSEED OIL PO) Take  by mouth.     • fluticasone (FLONASE) 50 MCG/ACT nasal spray 2 sprays into each nostril Daily. 1 bottle 0   • hydrochlorothiazide (HYDRODIURIL) 12.5 MG tablet Take 12.5 mg by mouth.     • isosorbide dinitrate (ISORDIL) 30 MG tablet Take 30 mg by mouth.     • meclizine (ANTIVERT) 25 MG tablet Take 1 tablet by mouth 3 (Three) Times a Day As Needed for dizziness. 30 tablet 5   • nitroglycerin (NITROSTAT) 0.4 MG SL tablet Place 0.4 mg under the tongue Every 5 (Five) Minutes.     • Omega-3 Fatty Acids (FISH OIL PO) Take 1 capsule by mouth.     • ondansetron (ZOFRAN) 4 MG tablet Take 4 mg by mouth Every 8 (Eight) Hours As Needed for Nausea or Vomiting.     • triamterene-hydrochlorothiazide (MAXZIDE-25) 37.5-25 MG per tablet Take 1/2 tablet PO QD (Patient taking differently: Take 0.5 tablets by mouth. Take 1/2 tablet PO QD) 30 tablet 0     No facility-administered encounter medications on file as of 9/11/2018.        Reviewed use of high risk medication in the elderly: yes  Reviewed for potential of harmful drug interactions in the elderly: yes    Follow Up:  Return in about 6 months (around 3/11/2019), or if symptoms worsen or fail to improve, for or sooner as needed, Next scheduled follow up.     An After Visit Summary and PPPS with all of these plans were given to the patient.

## 2018-09-12 NOTE — PROGRESS NOTES
Subjective   Steve Bethea is a 77 y.o. male. Patient here today with complaints of Hypertension and Follow-up  Patient here today for recheck of hypertension, was seen in urgent care a few weeks ago and advised to follow up here due to elevated blood pressure in urgent care, these records are reviewed, blood pressure when seen there was 124/74, continues on medication as prescribed previously, does have ectopy and is on isosorbide, continues to see Dr. Munoz for decreased hearing right ear 75% decreased according to patient.  He also continues to see Dr. tapia, cardiology.    Vitals:    09/11/18 1400   BP: 112/52   Pulse: 71   Temp: 98.3 °F (36.8 °C)     Past Medical History:   Diagnosis Date   • Coronary arteriosclerosis    • Degenerative joint disease involving multiple joints    • Hypertension    • Plantar fasciitis    • Upper respiratory infection      Hypertension   This is a chronic problem. The current episode started more than 1 year ago. The problem is unchanged. The problem is controlled. Associated symptoms include anxiety. Pertinent negatives include no blurred vision, chest pain, headaches, malaise/fatigue, neck pain, orthopnea, palpitations, peripheral edema, PND, shortness of breath or sweats. There are no associated agents to hypertension. Risk factors for coronary artery disease include male gender and obesity. Past treatments include lifestyle changes, central alpha agonists, diuretics and ACE inhibitors. Current antihypertension treatment includes diuretics, lifestyle changes, central alpha agonists and ACE inhibitors. The current treatment provides mild improvement. Compliance problems include exercise, diet and psychosocial issues.         The following portions of the patient's history were reviewed and updated as appropriate: allergies, current medications, past family history, past medical history, past social history, past surgical history and problem list.    Review of Systems    Constitutional: Negative.  Negative for malaise/fatigue.   HENT: Negative.    Eyes: Negative.  Negative for blurred vision.   Respiratory: Negative.  Negative for shortness of breath.    Cardiovascular: Negative.  Negative for chest pain, palpitations, orthopnea and PND.   Gastrointestinal: Negative.    Endocrine: Negative.    Genitourinary: Negative.    Musculoskeletal: Negative.  Negative for neck pain.   Skin: Negative.    Allergic/Immunologic: Negative.    Neurological: Negative.  Negative for headaches.   Hematological: Negative.    Psychiatric/Behavioral: Negative.        Objective   Physical Exam   Constitutional: He is oriented to person, place, and time. He appears well-developed and well-nourished. No distress.   HENT:   Head: Normocephalic and atraumatic.   Neck: Normal carotid pulses present. Carotid bruit is not present.   Cardiovascular: Normal rate, regular rhythm and normal heart sounds.  Exam reveals no gallop and no friction rub.    No murmur heard.  Pulmonary/Chest: Effort normal and breath sounds normal. No respiratory distress. He has no wheezes. He has no rales.   Abdominal: Soft.   Musculoskeletal: He exhibits no edema.   Neurological: He is alert and oriented to person, place, and time.   Skin: Skin is warm and dry. No rash noted. He is not diaphoretic. No erythema. No pallor.   Psychiatric: He has a normal mood and affect. His behavior is normal.   Nursing note and vitals reviewed.      Assessment/Plan   Steve was seen today for hypertension and follow-up.    Diagnoses and all orders for this visit:    Medicare annual wellness visit, initial    Essential hypertension    Urgent care records are reviewed, advised to continue on medication as prescribed previously, follow-up with Dr. Monahan, cardiology as scheduled.  Will need to follow up here as scheduled as well in 6 months or sooner if needed, continue to monitor blood pressure closely however today he is informed blood pressure is well  controlled.  He is aware and is in agreement to this plan.  All questions and concerns are addressed with understanding noted.

## 2019-01-14 ENCOUNTER — OFFICE VISIT (OUTPATIENT)
Dept: FAMILY MEDICINE CLINIC | Facility: CLINIC | Age: 79
End: 2019-01-14

## 2019-01-14 VITALS
WEIGHT: 176 LBS | HEIGHT: 70 IN | BODY MASS INDEX: 25.2 KG/M2 | TEMPERATURE: 96.9 F | HEART RATE: 53 BPM | OXYGEN SATURATION: 99 % | SYSTOLIC BLOOD PRESSURE: 124 MMHG | DIASTOLIC BLOOD PRESSURE: 70 MMHG

## 2019-01-14 DIAGNOSIS — M47.812 CERVICAL ARTHRITIS: Primary | ICD-10-CM

## 2019-01-14 DIAGNOSIS — M20.41 HAMMER TOE OF RIGHT FOOT: ICD-10-CM

## 2019-01-14 PROCEDURE — 99213 OFFICE O/P EST LOW 20 MIN: CPT | Performed by: NURSE PRACTITIONER

## 2019-01-15 DIAGNOSIS — R93.7 ABNORMAL X-RAY OF CERVICAL SPINE: ICD-10-CM

## 2019-01-15 DIAGNOSIS — M54.2 NECK PAIN: Primary | ICD-10-CM

## 2019-01-21 ENCOUNTER — HOSPITAL ENCOUNTER (OUTPATIENT)
Dept: MRI IMAGING | Facility: HOSPITAL | Age: 79
Discharge: HOME | End: 2019-01-21
Admitting: NURSE PRACTITIONER

## 2019-01-21 DIAGNOSIS — R93.7 ABNORMAL X-RAY OF CERVICAL SPINE: ICD-10-CM

## 2019-01-21 DIAGNOSIS — M54.2 NECK PAIN: ICD-10-CM

## 2019-01-21 PROCEDURE — 72141 MRI NECK SPINE W/O DYE: CPT

## 2019-01-21 NOTE — PROGRESS NOTES
"Subjective   Steve Bethea is a 78 y.o. male. Patient here today with complaints of problems with neck (grinds and pops) and Foot Injury (joint is swollen, would like exam and referral to ortho)  pt here today with complaints of hearing loss R ear after had vertigo in 12-18. States better now, has seen dr andrew, ent and reports had neg CT head. Reports neck popping, pain off and on x 8 months. Also complaining of foot injury, relates joint \"swollen\", desires referral to ortho/podiatry.     Vitals:    01/14/19 1409   BP: 124/70   Pulse: 53   Temp: 96.9 °F (36.1 °C)   SpO2: 99%     Past Medical History:   Diagnosis Date   • Coronary arteriosclerosis    • Degenerative joint disease involving multiple joints    • Hypertension    • Plantar fasciitis    • Upper respiratory infection      Foot Injury    The incident occurred more than 1 week ago. The injury mechanism is unknown. The quality of the pain is described as aching. The pain is moderate. The pain has been constant since onset. Pertinent negatives include no numbness or tingling. He reports no foreign bodies present. The symptoms are aggravated by movement and weight bearing. He has tried rest, non-weight bearing, acetaminophen and NSAIDs for the symptoms. The treatment provided no relief.   Neck Pain    This is a recurrent problem. The current episode started more than 1 month ago. The problem occurs constantly. The problem has been waxing and waning. The pain is associated with an unknown factor. The pain is present in the occipital region. The quality of the pain is described as aching. The pain is moderate. The symptoms are aggravated by twisting, position and bending. The pain is same all the time. Pertinent negatives include no chest pain, fever, headaches, leg pain, numbness, pain with swallowing, paresis, photophobia, syncope, tingling, trouble swallowing, visual change, weakness or weight loss. He has tried NSAIDs and acetaminophen for the symptoms. " The treatment provided no relief.        The following portions of the patient's history were reviewed and updated as appropriate: allergies, current medications, past family history, past medical history, past social history, past surgical history and problem list.    Review of Systems   Constitutional: Negative.  Negative for fever and weight loss.   HENT: Negative.  Negative for trouble swallowing.    Eyes: Negative.  Negative for photophobia.   Respiratory: Negative.    Cardiovascular: Negative.  Negative for chest pain and syncope.   Gastrointestinal: Negative.    Endocrine: Negative.    Genitourinary: Negative.    Musculoskeletal: Positive for arthralgias, gait problem and neck pain.   Skin: Negative.    Allergic/Immunologic: Negative.    Neurological: Negative for tingling, weakness, numbness and headaches.   Hematological: Negative.    Psychiatric/Behavioral: Negative.        Objective   Physical Exam   Constitutional: He is oriented to person, place, and time. He appears well-developed and well-nourished. No distress.   HENT:   Head: Normocephalic and atraumatic.   Cardiovascular: Normal rate, regular rhythm and normal heart sounds. Exam reveals no gallop and no friction rub.   No murmur heard.  Pulmonary/Chest: Effort normal and breath sounds normal. No stridor. No respiratory distress. He has no wheezes. He has no rales.   Musculoskeletal: He exhibits tenderness.        Cervical back: He exhibits decreased range of motion, tenderness and bony tenderness. He exhibits no swelling, no edema, no deformity, no laceration, no pain, no spasm and normal pulse.        Right foot: There is decreased range of motion, tenderness, bony tenderness and swelling. There is normal capillary refill, no crepitus, no deformity and no laceration.   Hammer toes R foot, bunion R foot    Neurological: He is alert and oriented to person, place, and time.   Skin: Skin is warm and dry. He is not diaphoretic.   Nursing note and  vitals reviewed.      Assessment/Plan   Steve was seen today for problems with neck and foot injury.    Diagnoses and all orders for this visit:    Cervical arthritis  -     XR Spine Cervical 2 or 3 View    Hammer toe of right foot  -     Ambulatory Referral to Podiatry    Referral made to podiatry, C-spine x-ray will be obtained and he will be informed of results, can consider MRI is abnormal, continue with Tylenol or Motrin when necessary pain OTC.  Declines any stronger pain medication today.  He will follow-up if symptoms should persist or worsen.  Otherwise I'll see him back as scheduled for chronic conditions.  He is aware and is in agreement to this plan.  All questions and concerns are addressed with understanding noted.

## 2019-01-31 ENCOUNTER — OFFICE VISIT (OUTPATIENT)
Dept: FAMILY MEDICINE CLINIC | Facility: CLINIC | Age: 79
End: 2019-01-31

## 2019-01-31 VITALS
DIASTOLIC BLOOD PRESSURE: 72 MMHG | TEMPERATURE: 97.8 F | SYSTOLIC BLOOD PRESSURE: 130 MMHG | BODY MASS INDEX: 24.97 KG/M2 | WEIGHT: 174.4 LBS | HEART RATE: 63 BPM | HEIGHT: 70 IN

## 2019-01-31 DIAGNOSIS — M50.30 BULGING OF CERVICAL INTERVERTEBRAL DISC: Primary | ICD-10-CM

## 2019-01-31 DIAGNOSIS — M25.78 DEGENERATION OF INTERVERTEBRAL DISC OF CERVICAL REGION WITH OSTEOPHYTE OF CERVICAL VERTEBRA: ICD-10-CM

## 2019-01-31 DIAGNOSIS — M50.30 DEGENERATION OF INTERVERTEBRAL DISC OF CERVICAL REGION WITH OSTEOPHYTE OF CERVICAL VERTEBRA: ICD-10-CM

## 2019-01-31 PROCEDURE — 99213 OFFICE O/P EST LOW 20 MIN: CPT | Performed by: NURSE PRACTITIONER

## 2019-01-31 NOTE — PROGRESS NOTES
"Subjective   Steve Bethea is a 78 y.o. male. Patient here today with complaints of Follow-up; Diarrhea; and Neck Pain  pt here today with complaints of neck \"popping\", denies radicular symptoms. Had c/s xray and MRI recently, here for those results. Has not yet been referred to neurosurg. Having minimal pain according to pt    Vitals:    01/31/19 0843   BP: 130/72   Pulse: 63   Temp: 97.8 °F (36.6 °C)     Past Medical History:   Diagnosis Date   • Coronary arteriosclerosis    • Degenerative joint disease involving multiple joints    • Hypertension    • Plantar fasciitis    • Upper respiratory infection      History of Present Illness     The following portions of the patient's history were reviewed and updated as appropriate: allergies, current medications, past family history, past medical history, past social history, past surgical history and problem list.    Review of Systems   Constitutional: Negative.    HENT: Negative.    Eyes: Negative.    Respiratory: Negative.    Cardiovascular: Negative.    Gastrointestinal: Negative.    Endocrine: Negative.    Genitourinary: Negative.    Musculoskeletal: Positive for neck pain.   Skin: Negative.    Allergic/Immunologic: Negative.    Neurological: Negative.    Hematological: Negative.    Psychiatric/Behavioral: Negative.        Objective   Physical Exam   Constitutional: He is oriented to person, place, and time. He appears well-developed and well-nourished. No distress.   HENT:   Head: Normocephalic and atraumatic.   Cardiovascular: Normal rate.   Pulmonary/Chest: Effort normal.   Musculoskeletal: He exhibits tenderness.        Cervical back: He exhibits decreased range of motion, tenderness and bony tenderness.   Neurological: He is alert and oriented to person, place, and time.   Skin: Skin is warm and dry. No rash noted. He is not diaphoretic. No erythema. No pallor.   Psychiatric: He has a normal mood and affect. His behavior is normal.   Nursing note and " "vitals reviewed.      Assessment/Plan   Steve was seen today for follow-up, diarrhea and neck pain.    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  -     Ambulatory Referral to Physical Therapy Evaluate and treat    previous c/s xray and MRI results reviewed with pt  He declines referred to neurosurg at present  Is referred to PT for eval and treatment as they deem nec, [facility]  He is to [facility] in 3 months for recheck,sooner if nec  If at that time he is still having concerns/pain, can reconsider referral to neurosurg  Pt states at present he \"does not want surgery\"  Can also consider referral to pain clinic for injections if pain persists  He is aware and is in agreement to this plan  All questions and concerns are addressed with understanding noted.       "

## 2019-02-01 ENCOUNTER — OFFICE VISIT (OUTPATIENT)
Dept: PODIATRY | Facility: CLINIC | Age: 79
End: 2019-02-01

## 2019-02-01 VITALS — BODY MASS INDEX: 24.97 KG/M2 | WEIGHT: 174.4 LBS | HEIGHT: 70 IN

## 2019-02-01 DIAGNOSIS — M20.21 HALLUX RIGIDUS OF RIGHT FOOT: ICD-10-CM

## 2019-02-01 DIAGNOSIS — M20.42 HAMMER TOES OF BOTH FEET: Primary | ICD-10-CM

## 2019-02-01 DIAGNOSIS — M20.41 HAMMER TOES OF BOTH FEET: Primary | ICD-10-CM

## 2019-02-01 DIAGNOSIS — R52 PAIN: ICD-10-CM

## 2019-02-01 DIAGNOSIS — M79.671 RIGHT FOOT PAIN: ICD-10-CM

## 2019-02-01 PROCEDURE — 99203 OFFICE O/P NEW LOW 30 MIN: CPT | Performed by: PODIATRIST

## 2019-02-01 PROCEDURE — 20600 DRAIN/INJ JOINT/BURSA W/O US: CPT | Performed by: PODIATRIST

## 2019-02-01 RX ORDER — BETAMETHASONE SODIUM PHOSPHATE AND BETAMETHASONE ACETATE 3; 3 MG/ML; MG/ML
6 INJECTION, SUSPENSION INTRA-ARTICULAR; INTRALESIONAL; INTRAMUSCULAR; SOFT TISSUE ONCE
Status: COMPLETED | OUTPATIENT
Start: 2019-02-01 | End: 2019-02-01

## 2019-02-01 RX ADMIN — BETAMETHASONE SODIUM PHOSPHATE AND BETAMETHASONE ACETATE 6 MG: 3; 3 INJECTION, SUSPENSION INTRA-ARTICULAR; INTRALESIONAL; INTRAMUSCULAR; SOFT TISSUE at 09:00

## 2019-02-01 NOTE — PROGRESS NOTES
Steve Bethea  1940  78 y.o. male    Patient presents to clinic today with complaint of right foot pain.     02/01/2019  Chief Complaint   Patient presents with   • Right Foot - Pain           History of Present Illness    Steve Bethea is a 78 y.o. male who presents for evaluation of right foot pain.  He states the pain is primarily in his forefoot in the area of his first and second toes.  He describes the pain as achy and worse with activity.  He states the pain is been worsening over several months.  He denies any known trauma or injuries.  He does report chronic hammertoe contractures which she wears toe sleeves for.  He denies any other treatments.      Past Medical History:   Diagnosis Date   • Coronary arteriosclerosis    • Degenerative joint disease involving multiple joints    • Hypertension    • Plantar fasciitis    • Upper respiratory infection          Past Surgical History:   Procedure Laterality Date   • ANGIOPLASTY  12/17/2012    x 1 stent   • COLONOSCOPY     • ENDOSCOPY N/A 5/11/2018    Procedure: ESOPHAGOGASTRODUODENOSCOPY possible dilation;  Surgeon: Praneeth Ignacio MD;  Location: [facility] ENDOSCOPY;  Service: Gastroenterology   • INJECTION OF MEDICATION  04/03/2013    Inj(s) Tend-Sheath, Ligament, Single 23771 (1)           Family History   Problem Relation Age of Onset   • Alzheimer's disease Mother    • Hypertension Mother    • Colon cancer Father    • Hypertension Father    • Stroke Other         uncle   • Cancer Brother          Social History     Socioeconomic History   • Marital status: Single     Spouse name: Not on file   • Number of children: Not on file   • Years of education: Not on file   • Highest education level: Not on file   Social Needs   • Financial resource strain: Not on file   • Food insecurity - worry: Not on file   • Food insecurity - inability: Not on file   • Transportation needs - medical: Not on file   • Transportation needs - non-medical: Not on file  "  Occupational History   • Not on file   Tobacco Use   • Smoking status: Former Smoker   • Smokeless tobacco: Never Used   Substance and Sexual Activity   • Alcohol use: No   • Drug use: No   • Sexual activity: Defer   Other Topics Concern   • Not on file   Social History Narrative   • Not on file         Current Outpatient Medications   Medication Sig Dispense Refill   • aspirin 81 MG EC tablet Take 81 mg by mouth Daily.     • atorvastatin (LIPITOR) 80 MG tablet Take 80 mg by mouth.     • Cholecalciferol 2000 UNITS tablet Take 2,000 Units by mouth.     • clopidogrel (PLAVIX) 75 MG tablet Take 75 mg by mouth.     • enalapril (VASOTEC) 20 MG tablet Take 20 mg by mouth.     • Flaxseed, Linseed, (FLAXSEED OIL PO) Take  by mouth.     • fluticasone (FLONASE) 50 MCG/ACT nasal spray 2 sprays into each nostril Daily. 1 bottle 0   • hydrochlorothiazide (HYDRODIURIL) 12.5 MG tablet Take 12.5 mg by mouth.     • isosorbide dinitrate (ISORDIL) 30 MG tablet Take 30 mg by mouth.     • meclizine (ANTIVERT) 25 MG tablet Take 1 tablet by mouth 3 (Three) Times a Day As Needed for dizziness. 30 tablet 5   • nitroglycerin (NITROSTAT) 0.4 MG SL tablet Place 0.4 mg under the tongue Every 5 (Five) Minutes.     • Omega-3 Fatty Acids (FISH OIL PO) Take 1 capsule by mouth.     • ondansetron (ZOFRAN) 4 MG tablet Take 4 mg by mouth Every 8 (Eight) Hours As Needed for Nausea or Vomiting.     • triamterene-hydrochlorothiazide (MAXZIDE-25) 37.5-25 MG per tablet Take 1/2 tablet PO QD (Patient taking differently: Take 0.5 tablets by mouth. Take 1/2 tablet PO QD) 30 tablet 0     Current Facility-Administered Medications   Medication Dose Route Frequency Provider Last Rate Last Dose   • betamethasone acetate-betamethasone sodium phosphate (CELESTONE SOLUSPAN) injection 6 mg  6 mg Intramuscular Once Osman Mckeon DPM             OBJECTIVE    Ht 177.8 cm (70\")   Wt 79.1 kg (174 lb 6.4 oz)   BMI 25.02 kg/m²       Review of Systems "   Constitutional: Negative.    HENT: Negative.    Eyes: Negative.    Respiratory: Negative.    Cardiovascular: Negative.    Gastrointestinal: Negative.    Endocrine: Negative.    Genitourinary: Negative.    Musculoskeletal: Positive for arthralgias.        Foot pain   Allergic/Immunologic: Negative.    Neurological: Negative.    Hematological: Negative.    Psychiatric/Behavioral: Negative.          Physical Exam   Constitutional: he appears well-developed and well-nourished.   HEENT: Normocephalic. Atraumatic  CV: No CP. RRR  Resp: Non-labored respirations  Psychiatric: he has a normal mood and affect. her behavior is normal.         Lower Extremity Exam:  Vascular: DP/PT pulses palpable 2+.   No edema  Foot warm  CFT wnl  Neuro: Protective sensation intact, b/l.  DTRs intact  Integument: No open wounds   Heloma dura formation to medial aspect of right second digit  No erythema, scaling  Skin quality normal  Musculoskeletal: LE muscle strength 5/5.   Gait normal  Ankle ROM decreased without pain or crepitus  STJ ROM full without pain or crepitus  1st MTPJ ROM decreased with mild tenderness. Mild HAV  +Dorsomedial 1st mtpj eminence. Mild tenderness on palpation.  Semirigid hammertoe deformity 2 through 5 bilateral.      Small joint injection:  Risks and benefits discussed. Written consent obtained.  Skin prepped with alcohol  Right 1st MTPJ injection performed with 1cc 0.5% Marcaine, 6mg celestone with 27g needle  Band aid applied. Patient tolerated well.        ASSESSMENT AND PLAN    Steve was seen today for pain.    Diagnoses and all orders for this visit:    Hammer toes of both feet    Pain  -     XR Foot 3+ View Right    Hallux rigidus of right foot  -     betamethasone acetate-betamethasone sodium phosphate (CELESTONE SOLUSPAN) injection 6 mg; Inject 1 mL into the appropriate muscle as directed by prescriber 1 (One) Time.    Right foot pain      -Comprehensive foot and ankle exam performed  -Radiographs  ordered and reviewed  -Educated pt on diagnosis, etiology and treatment of hammertoe deformity with concurrent hallux rigidus.  -Recommend soft, wide toe box accommodative shoe gear.  -Dispensed toe spacers and toe sleeves.  -Right first metatarsal joint injection as above  -Recheck 4 weeks as needed.          This document has been electronically signed by Osman Mckeon DPM on February 1, 2019 1:09 PM     EMR Dragon/Transcription disclaimer:   Much of this encounter note is an electronic transcription/translation of spoken language to printed text. The electronic translation of spoken language may permit erroneous, or at times, nonsensical words or phrases to be inadvertently transcribed; Although I have reviewed the note for such errors, some may still exist.    Osman Mckeon DPM  2/1/2019  1:09 PM

## 2019-02-06 ENCOUNTER — OFFICE VISIT (OUTPATIENT)
Dept: FAMILY MEDICINE CLINIC | Facility: CLINIC | Age: 79
End: 2019-02-06

## 2019-02-06 VITALS
DIASTOLIC BLOOD PRESSURE: 78 MMHG | HEART RATE: 63 BPM | HEIGHT: 70 IN | BODY MASS INDEX: 25.34 KG/M2 | SYSTOLIC BLOOD PRESSURE: 130 MMHG | TEMPERATURE: 98.1 F | WEIGHT: 177 LBS

## 2019-02-06 DIAGNOSIS — M25.561 ACUTE PAIN OF RIGHT KNEE: Primary | ICD-10-CM

## 2019-02-06 DIAGNOSIS — I10 ESSENTIAL HYPERTENSION: ICD-10-CM

## 2019-02-06 DIAGNOSIS — M15.9 OSTEOARTHRITIS OF MULTIPLE JOINTS, UNSPECIFIED OSTEOARTHRITIS TYPE: ICD-10-CM

## 2019-02-06 PROCEDURE — 99213 OFFICE O/P EST LOW 20 MIN: CPT | Performed by: NURSE PRACTITIONER

## 2019-02-06 RX ORDER — METHYLPREDNISOLONE ACETATE 80 MG/ML
80 INJECTION, SUSPENSION INTRA-ARTICULAR; INTRALESIONAL; INTRAMUSCULAR; SOFT TISSUE ONCE
Status: DISCONTINUED | OUTPATIENT
Start: 2019-02-06 | End: 2020-01-08

## 2019-02-06 NOTE — PROGRESS NOTES
"Subjective   Steve Bethea is a 78 y.o. male. Patient here today with complaints of Knee Pain  pt here today with complaints of bilat knee pain, R>L, popping and increased pain when he stepped on step today at sister's house, denies injury otherwise. Ambulatory with assist of cane now. Pain worse today, states is very active, cleaning house and on knees , when he does so his knees \"burn\".  Has not tried any pain medicine yet.     Vitals:    02/06/19 1310   BP: 130/78   Pulse: 63   Temp: 98.1 °F (36.7 °C)     Past Medical History:   Diagnosis Date   • Coronary arteriosclerosis    • Degenerative joint disease involving multiple joints    • Hypertension    • Plantar fasciitis    • Upper respiratory infection      Knee Pain    The incident occurred 3 to 6 hours ago. The injury mechanism is unknown. The pain is present in the right knee. The quality of the pain is described as aching. The pain is at a severity of 5/10. The pain is moderate. The pain has been intermittent since onset. Associated symptoms include an inability to bear weight and a loss of motion. Pertinent negatives include no loss of sensation, muscle weakness, numbness or tingling. He reports no foreign bodies present. The symptoms are aggravated by weight bearing. He has tried rest and acetaminophen for the symptoms. The treatment provided mild relief.        The following portions of the patient's history were reviewed and updated as appropriate: allergies, current medications, past family history, past medical history, past social history, past surgical history and problem list.    Review of Systems   Constitutional: Negative.    HENT: Negative.    Eyes: Negative.    Respiratory: Negative.    Cardiovascular: Negative.    Gastrointestinal: Negative.    Endocrine: Negative.    Genitourinary: Negative.    Musculoskeletal: Positive for arthralgias and gait problem.   Skin: Negative.    Allergic/Immunologic: Negative.    Neurological: Negative for " tingling and numbness.   Hematological: Negative.    Psychiatric/Behavioral: Negative.        Objective   Physical Exam   Constitutional: He is oriented to person, place, and time. He appears well-developed and well-nourished. No distress.   HENT:   Head: Normocephalic and atraumatic.   Cardiovascular: Normal rate, regular rhythm and normal heart sounds.  Occasional extrasystoles are present. Exam reveals no gallop and no friction rub.   No murmur heard.  Pulmonary/Chest: Effort normal and breath sounds normal. No stridor. No respiratory distress. He has no wheezes. He has no rales.   Musculoskeletal: He exhibits tenderness.        Right knee: He exhibits decreased range of motion and bony tenderness. He exhibits no swelling, no effusion, no ecchymosis, no deformity, no laceration, no erythema, normal alignment, no LCL laxity, normal patellar mobility, normal meniscus and no MCL laxity. Tenderness found. Medial joint line tenderness noted.   Ambulatory with assist of cane, pain worsens with ambulation, weight bearing   Neurological: He is alert and oriented to person, place, and time.   Skin: Skin is warm and dry. He is not diaphoretic.   Psychiatric: He has a normal mood and affect. His behavior is normal. His speech is rapid and/or pressured.   Nursing note and vitals reviewed.      Assessment/Plan   Steve was seen today for knee pain.    Diagnoses and all orders for this visit:    Acute pain of right knee  -     XR Knee 1 or 2 View Right  -     methylPREDNISolone acetate (DEPO-medrol) injection 80 mg; Inject 1 mL into the appropriate muscle as directed by prescriber 1 (One) Time.    Essential hypertension    Osteoarthritis of multiple joints, unspecified osteoarthritis type    will obtain xray on R knee, inform via phone of results  Is given depo medrol 80mg inj IM in office today  If pain persists , will consider referral to ortho/ MRI/PT  He is aware and is in agreement to this plan  All questions and  concerns are addressed with understanding noted.   Offered pain medication but declines, will continue tylenol ES prn pain OTC

## 2019-02-11 DIAGNOSIS — G89.29 CHRONIC PAIN OF RIGHT KNEE: Primary | ICD-10-CM

## 2019-02-11 DIAGNOSIS — M25.561 CHRONIC PAIN OF RIGHT KNEE: Primary | ICD-10-CM

## 2019-02-12 ENCOUNTER — OFFICE VISIT (OUTPATIENT)
Dept: ORTHOPEDIC SURGERY | Facility: CLINIC | Age: 79
End: 2019-02-12

## 2019-02-12 VITALS — HEIGHT: 70 IN | BODY MASS INDEX: 24.77 KG/M2 | WEIGHT: 173 LBS

## 2019-02-12 DIAGNOSIS — M23.91 INTERNAL DERANGEMENT OF RIGHT KNEE: ICD-10-CM

## 2019-02-12 DIAGNOSIS — M25.561 ACUTE PAIN OF RIGHT KNEE: Primary | ICD-10-CM

## 2019-02-12 DIAGNOSIS — M25.461 EFFUSION OF KNEE JOINT RIGHT: ICD-10-CM

## 2019-02-12 DIAGNOSIS — M17.11 PRIMARY OSTEOARTHRITIS OF RIGHT KNEE: ICD-10-CM

## 2019-02-12 PROCEDURE — 99214 OFFICE O/P EST MOD 30 MIN: CPT | Performed by: NURSE PRACTITIONER

## 2019-02-12 PROCEDURE — 20610 DRAIN/INJ JOINT/BURSA W/O US: CPT | Performed by: NURSE PRACTITIONER

## 2019-02-12 RX ORDER — MULTIVITAMIN WITH IRON
250 TABLET ORAL DAILY
COMMUNITY
End: 2023-01-19

## 2019-02-12 RX ADMIN — LIDOCAINE HYDROCHLORIDE 2 ML: 10 INJECTION, SOLUTION EPIDURAL; INFILTRATION; INTRACAUDAL; PERINEURAL at 11:23

## 2019-02-12 RX ADMIN — TRIAMCINOLONE ACETONIDE 40 MG: 40 INJECTION, SUSPENSION INTRA-ARTICULAR; INTRAMUSCULAR at 11:23

## 2019-02-12 NOTE — PROGRESS NOTES
Steve Bethea is a 78 y.o. male   Primary provider:  Maria Teresa Torres APRN       Chief Complaint   Patient presents with   • Right Knee - Pain   • Establish Care       HISTORY OF PRESENT ILLNESS:     78-year-old male patient presents to office for evaluation of right knee pain.  Onset of this acute pain occurred one week ago.  Patient denies any known injury but states that he had increased and severe right knee pain after stepping down on a step at his sister's house on 2/6/2019.  Patient was evaluated by his primary care provider, VONDA Gunderson, on that date and was given an IM injection of DepoMedrol.  Patient reports he has had difficulty walking since then and has been using a cane. Patient reports some chronic bilateral knee pain for several months/years with the right being worse than the left but his pain changed on 2/6/2019.  Pain is described as constant and moderate to severe.  Pain is described as grinding, aching and burning in nature with associated swelling.  Pain is worse with movement/motion of the knee, kneeling, pivoting, standing and walking.  Pain improves mildly with rest, modified weightbearing with use of a cane and IM injection of steroid.       Pain   This is a new problem. The current episode started 1 to 4 weeks ago (2/6/2019). The problem occurs constantly. The problem has been unchanged. Associated symptoms include arthralgias, joint swelling and a sore throat. Associated symptoms comments: Aching, grinding and burning pain with swelling. . The symptoms are aggravated by walking, twisting and standing (movement of knee). He has tried rest (IM steroid injection, use of a cane) for the symptoms. The treatment provided mild relief.        CONCURRENT MEDICAL HISTORY:    Past Medical History:   Diagnosis Date   • Coronary arteriosclerosis    • Degenerative joint disease involving multiple joints    • Hypertension    • Plantar fasciitis    • Sleep apnea    • Upper respiratory  infection        No Known Allergies      Current Outpatient Medications:   •  aspirin 81 MG EC tablet, Take 81 mg by mouth Daily., Disp: , Rfl:   •  atorvastatin (LIPITOR) 80 MG tablet, Take 80 mg by mouth., Disp: , Rfl:   •  Cholecalciferol 2000 UNITS tablet, Take 2,000 Units by mouth., Disp: , Rfl:   •  clopidogrel (PLAVIX) 75 MG tablet, Take 75 mg by mouth., Disp: , Rfl:   •  enalapril (VASOTEC) 20 MG tablet, Take 20 mg by mouth., Disp: , Rfl:   •  Flaxseed, Linseed, (FLAXSEED OIL PO), Take  by mouth., Disp: , Rfl:   •  fluticasone (FLONASE) 50 MCG/ACT nasal spray, 2 sprays into each nostril Daily., Disp: 1 bottle, Rfl: 0  •  hydrochlorothiazide (HYDRODIURIL) 12.5 MG tablet, Take 12.5 mg by mouth., Disp: , Rfl:   •  isosorbide dinitrate (ISORDIL) 30 MG tablet, Take 30 mg by mouth., Disp: , Rfl:   •  Magnesium 250 MG tablet, Take  by mouth., Disp: , Rfl:   •  meclizine (ANTIVERT) 25 MG tablet, Take 1 tablet by mouth 3 (Three) Times a Day As Needed for dizziness., Disp: 30 tablet, Rfl: 5  •  nitroglycerin (NITROSTAT) 0.4 MG SL tablet, Place 0.4 mg under the tongue Every 5 (Five) Minutes., Disp: , Rfl:   •  Omega-3 Fatty Acids (FISH OIL PO), Take 1 capsule by mouth., Disp: , Rfl:   •  ondansetron (ZOFRAN) 4 MG tablet, Take 4 mg by mouth Every 8 (Eight) Hours As Needed for Nausea or Vomiting., Disp: , Rfl:   •  triamterene-hydrochlorothiazide (MAXZIDE-25) 37.5-25 MG per tablet, Take 1/2 tablet PO QD (Patient taking differently: Take 0.5 tablets by mouth. Take 1/2 tablet PO QD), Disp: 30 tablet, Rfl: 0    Current Facility-Administered Medications:   •  methylPREDNISolone acetate (DEPO-medrol) injection 80 mg, 80 mg, Intramuscular, Once, Maria Teresa Torres APRN    Past Surgical History:   Procedure Laterality Date   • ANGIOPLASTY  12/17/2012    x 1 stent   • COLONOSCOPY     • ENDOSCOPY N/A 5/11/2018    Procedure: ESOPHAGOGASTRODUODENOSCOPY possible dilation;  Surgeon: Praneeth Ignacio MD;  Location: [facility] ENDOSCOPY;   "Service: Gastroenterology   • INJECTION OF MEDICATION  04/03/2013    Inj(s) Tend-Sheath, Ligament, Single 79218 (1)         Family History   Problem Relation Age of Onset   • Alzheimer's disease Mother    • Hypertension Mother    • Colon cancer Father    • Hypertension Father    • Stroke Other         uncle   • Heart disease Other    • Cancer Brother        Social History     Socioeconomic History   • Marital status: Single     Spouse name: Not on file   • Number of children: Not on file   • Years of education: Not on file   • Highest education level: Not on file   Social Needs   • Financial resource strain: Not on file   • Food insecurity - worry: Not on file   • Food insecurity - inability: Not on file   • Transportation needs - medical: Not on file   • Transportation needs - non-medical: Not on file   Occupational History   • Not on file   Tobacco Use   • Smoking status: Former Smoker   • Smokeless tobacco: Never Used   Substance and Sexual Activity   • Alcohol use: No   • Drug use: No   • Sexual activity: Defer   Other Topics Concern   • Not on file   Social History Narrative   • Not on file        Review of Systems   Constitutional: Positive for activity change.   HENT: Positive for hearing loss and sore throat.    Eyes: Negative.    Respiratory: Positive for wheezing.    Gastrointestinal: Negative.    Endocrine: Negative.    Genitourinary: Negative.    Musculoskeletal: Positive for arthralgias, gait problem and joint swelling.        Right knee pain.    Skin: Negative.    Allergic/Immunologic: Negative.    Hematological: Negative.    Psychiatric/Behavioral: Positive for sleep disturbance.   All other systems reviewed and are negative.      PHYSICAL EXAMINATION:       Ht 177.8 cm (70\")   Wt 78.5 kg (173 lb)   BMI 24.82 kg/m²     Physical Exam   Constitutional: He is oriented to person, place, and time. Vital signs are normal. He appears well-developed and well-nourished. He is active and cooperative. He does " not appear ill. No distress.   HENT:   Head: Normocephalic.   Pulmonary/Chest: Effort normal. No respiratory distress.   Abdominal: Soft. He exhibits no distension.   Musculoskeletal: He exhibits edema (Mild, right knee) and tenderness (Mild, right knee). He exhibits no deformity.        Right knee: He exhibits effusion.        Left knee: He exhibits no effusion.   Neurological: He is alert and oriented to person, place, and time. GCS eye subscore is 4. GCS verbal subscore is 5. GCS motor subscore is 6.   Skin: Skin is warm, dry and intact. Capillary refill takes less than 2 seconds. No erythema.   Psychiatric: He has a normal mood and affect. His speech is normal and behavior is normal. Judgment and thought content normal. Cognition and memory are normal.   Vitals reviewed.      GAIT:     []  Normal  [x]  Antalgic    Assistive device: []  None  []  Walker     []  Crutches  [x]  Cane     []  Wheelchair  []  Stretcher    Right Knee Exam     Tenderness   The patient is experiencing tenderness in the medial joint line and lateral joint line (Mild, diffuse).    Range of Motion   Extension: 5   Flexion: 110     Tests   Collins:  Medial - positive Lateral - positive  Varus: negative Valgus: negative    Other   Erythema: absent  Sensation: normal  Pulse: present  Swelling: mild  Effusion: effusion present    Comments:  Pain and limitations with range of motion. Pain increases with rotation of the knee. Mild to moderate swelling present. Effusion present. No erythema. No warmth. Stable joint exam.       Left Knee Exam     Tenderness   The patient is experiencing no tenderness.     Range of Motion   Extension: 0   Flexion: 130     Tests   Collins:  Medial - negative Lateral - negative  Varus: negative Valgus: negative    Other   Erythema: absent  Sensation: normal  Pulse: present  Swelling: none  Effusion: no effusion present            Xr Knee 1 Or 2 View Right    Result Date: 2/6/2019  Narrative: Two view right knee  HISTORY: Right knee pain x1 year. No known injury. AP and lateral views obtained. COMPARISON: None No fracture or dislocation. Small patellar spurs. No significant joint space narrowing. No suprapatellar effusion. No other osseous or articular abnormality.     Impression: CONCLUSION: Small patellar spurs. [id number] Electronically signed by:  Aditya Shen MD  2/6/2019 5:39 PM CST Workstation: [id number]    Xr Knee Bilateral Ap Standing    Result Date: 2/13/2019  Narrative: AP standing view of bilateral knees reveals no evidence of acute fracture or dislocation.  There are degenerative changes noted bilaterally, more pronounced in the right knee.  There is moderate loss of medial compartmental joint spacing noted in the right knee.  There is mild loss of medial compartmental joint spacing noted in the left knee.  Lateral joint spacing is maintained bilaterally.  Anatomic alignment of the knee joints remains acceptable.  Soft tissues appear unremarkable.  No acute bony radiologic abnormalities are noted at this time.  No comparison images are available for review.02/13/19 at 2:43 PM by VONDA Caraballo     Xr Foot 3+ View Right    Result Date: 2/1/2019  Narrative: Exam: 3 view weightbearing radiographs right foot Comparison Studies: None Indication: Chronic right forefoot pain Findings: Normal osseous density.  No acute fractures, erosions or subluxations are seen.  Soft tissues are within normal limits.  Moderate to severe degenerative changes of first metatarsal phalangeal joint with dorsal loose body noted.  Chronic hammertoe contractures 2 through 5.  Mild degenerative changes seen throughout the midfoot.  Mild plantar and posterior calcaneal enthesophyte. Result: As above.  Chronic degenerative changes.     Mri Cervical Spine Without Contrast    Result Date: 1/21/2019  Narrative: Neck pain and bilateral arm pain. MR, cervical spine without intravenous contrast. No comparison. MRI scan 1.5 Marina. Examination  revealed normal alignment of the cervical spine. No subluxation is seen. There is fusion of C4 and C5 vertebra. There is disc space narrowing at C5-C6. There is mild loss of height of C5 vertebra. There is fatty marrow change consistent is osteopenia. The predental space is unremarkable. The craniocervical junction is normal in appearance. C2-C3 no significant abnormality is seen. C3-C4 there is disc bulge/osteophyte complex without cord effacement. There are mild bilateral uncovertebral osteophytes and bilateral facet arthropathy producing mild right neural foraminal stenosis. C4-C5 there is mild posterior osteophytic ridging without cord effacement. There is bilateral facet arthropathy. C5-C6 there is disc bulge/osteophyte complex without cord effacement. There is bilateral facet arthropathy. There is mild left neural foraminal stenosis. There is small nerve root sheath cyst seen in the left. C6-C7 there is mild disc bulge/*five complex without cord effacement. C7-T1 there is facet arthropathy on the left. No abnormal signal is seen within the spinal cord.     Impression: CONCLUSION: Degenerative and postoperative changes as described above. Electronically signed by:  Jerome Patel MD  1/21/2019 1:24 PM CST Workstation: [id number]        ASSESSMENT:    Diagnoses and all orders for this visit:    Acute pain of right knee  -     Large Joint Arthrocentesis: R knee  -     MRI Knee Right Without Contrast; Future    Effusion of knee joint right  -     Large Joint Arthrocentesis: R knee  -     MRI Knee Right Without Contrast; Future    Internal derangement of right knee  -     Large Joint Arthrocentesis: R knee  -     MRI Knee Right Without Contrast; Future    Primary osteoarthritis of right knee  -     Large Joint Arthrocentesis: R knee  -     MRI Knee Right Without Contrast; Future    Large Joint Arthrocentesis: R knee  Date/Time: 2/12/2019 11:23 AM  Consent given by: patient  Site marked: site marked  Timeout:  Immediately prior to procedure a time out was called to verify the correct patient, procedure, equipment, support staff and site/side marked as required   Supporting Documentation  Indications: pain, joint swelling and diagnostic evaluation   Procedure Details  Location: knee - R knee  Preparation: Patient was prepped and draped in the usual sterile fashion  Needle size: 18 G  Approach: lateral  Medications administered: 2 mL lidocaine PF 1% 1 %; 40 mg triamcinolone acetonide 40 MG/ML  Aspirate amount: 20 mL  Aspirate: yellow and clear  Patient tolerance: patient tolerated the procedure well with no immediate complications      PLAN    Right knee x-rays done on 2/6/2019 are reviewed today with no acute findings noted.  AP standing x-rays of the bilateral knees performed in office today and reviewed with no acute findings noted.  Patient complains of acute right knee pain that occurred 1 week ago when he stepped down off a step at his sister's house.  Patient has had increased pain and swelling since that time.  Effusion is present today.  Recommend aspiration and intra-articular injection of steroid to the right knee today for management of pain/swelling/inflammation.  Recommend MRI right knee to evaluate for fracture, bone contusion, ligament injury and/or meniscal injury.  Recommend to continue with modified weightbearing with use of his cane.  Recommend rest and activity modification as tolerated and based on his pain.  Patient is quite active and often cares for others.  He is encouraged to rest until his knee pain begins to improve.  Patient is instructed that he can gradually progress his weightbearing and activity as tolerated.  Recommend elevation of the right knee to minimize swelling.  Recommend ice therapy to the right knee intermittently 3-4 times daily for 20 minutes at a time to minimize pain/swelling.  Recommend Tylenol as needed for pain.  Patient has a history of coronary artery disease with  angioplasty so we will avoid oral NSAIDs.  Follow-up after MRI.    Return for follow up after MRI.        This document has been electronically signed by VONDA Caraballo on February 15, 2019 1:01 PM      VONDA Caraballo

## 2019-02-15 RX ORDER — LIDOCAINE HYDROCHLORIDE 10 MG/ML
2 INJECTION, SOLUTION EPIDURAL; INFILTRATION; INTRACAUDAL; PERINEURAL
Status: COMPLETED | OUTPATIENT
Start: 2019-02-12 | End: 2019-02-12

## 2019-02-15 RX ORDER — TRIAMCINOLONE ACETONIDE 40 MG/ML
40 INJECTION, SUSPENSION INTRA-ARTICULAR; INTRAMUSCULAR
Status: COMPLETED | OUTPATIENT
Start: 2019-02-12 | End: 2019-02-12

## 2019-02-19 ENCOUNTER — HOSPITAL ENCOUNTER (OUTPATIENT)
Dept: MRI IMAGING | Facility: HOSPITAL | Age: 79
Discharge: HOME | End: 2019-02-19
Admitting: NURSE PRACTITIONER

## 2019-02-19 DIAGNOSIS — M25.561 ACUTE PAIN OF RIGHT KNEE: ICD-10-CM

## 2019-02-19 DIAGNOSIS — M17.11 PRIMARY OSTEOARTHRITIS OF RIGHT KNEE: ICD-10-CM

## 2019-02-19 DIAGNOSIS — M25.461 EFFUSION OF KNEE JOINT RIGHT: ICD-10-CM

## 2019-02-19 DIAGNOSIS — M23.91 INTERNAL DERANGEMENT OF RIGHT KNEE: ICD-10-CM

## 2019-02-19 PROCEDURE — 73721 MRI JNT OF LWR EXTRE W/O DYE: CPT

## 2019-02-27 ENCOUNTER — OFFICE VISIT (OUTPATIENT)
Dept: ORTHOPEDIC SURGERY | Facility: CLINIC | Age: 79
End: 2019-02-27

## 2019-02-27 VITALS — BODY MASS INDEX: 25.2 KG/M2 | HEIGHT: 70 IN | WEIGHT: 176 LBS

## 2019-02-27 DIAGNOSIS — M25.461 EFFUSION OF KNEE JOINT RIGHT: ICD-10-CM

## 2019-02-27 DIAGNOSIS — M17.11 PRIMARY OSTEOARTHRITIS OF RIGHT KNEE: ICD-10-CM

## 2019-02-27 DIAGNOSIS — M23.321 DEGENERATIVE TEAR OF POSTERIOR HORN OF MEDIAL MENISCUS, RIGHT: Primary | ICD-10-CM

## 2019-02-27 DIAGNOSIS — M25.561 ACUTE PAIN OF RIGHT KNEE: ICD-10-CM

## 2019-02-27 DIAGNOSIS — M23.91 INTERNAL DERANGEMENT OF RIGHT KNEE: ICD-10-CM

## 2019-02-27 PROCEDURE — 99213 OFFICE O/P EST LOW 20 MIN: CPT | Performed by: ORTHOPAEDIC SURGERY

## 2019-02-27 NOTE — PROGRESS NOTES
"Steve Bethea is a 78 y.o. male returns for     Chief Complaint   Patient presents with   • Right Knee - Follow-up, Pain   • Results       HISTORY OF PRESENT ILLNESS: f/u right knee pain, mri done on 2/19/2019. Patient seen by Annabel on 2/12/2019 knee was aspirated and injected with steroid helped some.   Pain is improved since injection  No new complaints  No new injury  Not using a cane currently  Mostly dull aches/occasional sharp pains.     CONCURRENT MEDICAL HISTORY:    The following portions of the patient's history were reviewed and updated as appropriate: allergies, current medications, past family history, past medical history, past social history, past surgical history and problem list.     ROS  No fevers or chills.  No chest pain or shortness of air.  No GI or  disturbances.    PHYSICAL EXAMINATION:       Ht 177.8 cm (70\")   Wt 79.8 kg (176 lb)   BMI 25.25 kg/m²     Physical Exam   Constitutional: He is oriented to person, place, and time. He appears well-developed and well-nourished.   Musculoskeletal:        Right knee: He exhibits no effusion.   Neurological: He is alert and oriented to person, place, and time.   Psychiatric: He has a normal mood and affect. His behavior is normal. Judgment and thought content normal.       GAIT:     []  Normal  []  Antalgic    Assistive device: [x]  None  []  Walker     []  Crutches  []  Cane     []  Wheelchair  []  Stretcher    Right Knee Exam     Tenderness   The patient is experiencing tenderness in the medial joint line and lateral joint line (Mild, diffuse).    Range of Motion   Extension: 5   Flexion: 110     Tests   Collins:  Medial - positive Lateral - positive  Varus: negative Valgus: negative    Other   Erythema: absent  Sensation: normal  Pulse: present  Swelling: mild  Effusion: no effusion present    Comments:  Pain and limitations with range of motion. Pain increases with rotation of the knee. Mild swelling present. No effusion present. No " erythema. No warmth. Stable joint exam.               Xr Knee 1 Or 2 View Right    Result Date: 2/6/2019  Narrative: Two view right knee HISTORY: Right knee pain x1 year. No known injury. AP and lateral views obtained. COMPARISON: None No fracture or dislocation. Small patellar spurs. No significant joint space narrowing. No suprapatellar effusion. No other osseous or articular abnormality.     Impression: CONCLUSION: Small patellar spurs. [id number] Electronically signed by:  Aditya Shen MD  2/6/2019 5:39 PM [employer] Workstation: [id number]    Xr Knee Bilateral Ap Standing    Result Date: 2/13/2019  Narrative: AP standing view of bilateral knees reveals no evidence of acute fracture or dislocation.  There are degenerative changes noted bilaterally, more pronounced in the right knee.  There is moderate loss of medial compartmental joint spacing noted in the right knee.  There is mild loss of medial compartmental joint spacing noted in the left knee.  Lateral joint spacing is maintained bilaterally.  Anatomic alignment of the knee joints remains acceptable.  Soft tissues appear unremarkable.  No acute bony radiologic abnormalities are noted at this time.  No comparison images are available for review.02/13/19 at 2:43 PM by VONDA Caraballo         Mri Knee Right Without Contrast    Result Date: 2/19/2019  Narrative: MRI right knee. HISTORY: Right knee pain, effusion. Prior examination knee fairly 12, 3951. TECHNIQUE: Multiplanar multisequence noncontrast images right knee. FINDINGS: Normal intact anterior and posterior cruciate ligaments. Degenerative osteoarthritic changes. Cartilage loss, grade 3 and grade IV chondromalacia (osteochondral defects) distal femur medial aspect. Complex degenerative type tear with a bucket-handle abnormality posterior horn medial meniscus. Normal anterior horn. Subtle signal abnormalities, mucoid type degenerative changes lateral meniscus. No discrete tears identified. Small effusion.  Moderate size popliteal, Baker's cyst 5.25 x1.16 x 2.18 cm.  Normal medial collateral ligament and lateral collateral ligament complex.     Impression: CONCLUSION: Degenerative osteoarthritic changes medial aspect tibiofemoral joint. Cartilage loss. Grade 3 and grade IV chondromalacia (osteochondral defects) distal femur medial aspect. Complex tear and bucket-handle abnormality posterior horn medial meniscus. Normal anterior horn. Areas of signal abnormality, mucoid type degenerative changes lateral meniscus. No discrete tear is identified. Joint effusion. Popliteal, Baker's cyst. MRI right knee is otherwise unremarkable. Electronically signed by:  Daniel Blanco MD  2/19/2019 2:12 PM CST Workstation: [id number]            ASSESSMENT:    Diagnoses and all orders for this visit:    Degenerative tear of posterior horn of medial meniscus, right  -     Cane    Acute pain of right knee    Effusion of knee joint right    Internal derangement of right knee  -     Cane    Primary osteoarthritis of right knee  -     Cane          PLAN    Use cane in left hand.  Activity as tolerated  We will see how symptoms change since his steroid injection  Recheck in 4 weeks, discussed possible arthroscopy of right knee      Patient's Body mass index is 25.25 kg/m². BMI is within normal parameters. No follow-up required..    Return in about 4 weeks (around 3/27/2019) for recheck.    Catalino Graham MD

## 2019-03-27 ENCOUNTER — OFFICE VISIT (OUTPATIENT)
Dept: ORTHOPEDIC SURGERY | Facility: CLINIC | Age: 79
End: 2019-03-27

## 2019-03-27 VITALS — WEIGHT: 175 LBS | BODY MASS INDEX: 25.05 KG/M2 | HEIGHT: 70 IN

## 2019-03-27 DIAGNOSIS — M25.561 CHRONIC PAIN OF RIGHT KNEE: ICD-10-CM

## 2019-03-27 DIAGNOSIS — M23.321 DEGENERATIVE TEAR OF POSTERIOR HORN OF MEDIAL MENISCUS, RIGHT: Primary | ICD-10-CM

## 2019-03-27 DIAGNOSIS — G89.29 CHRONIC PAIN OF RIGHT KNEE: ICD-10-CM

## 2019-03-27 DIAGNOSIS — M17.11 PRIMARY OSTEOARTHRITIS OF RIGHT KNEE: ICD-10-CM

## 2019-03-27 DIAGNOSIS — M25.461 EFFUSION OF KNEE JOINT RIGHT: ICD-10-CM

## 2019-03-27 DIAGNOSIS — M23.91 INTERNAL DERANGEMENT OF RIGHT KNEE: ICD-10-CM

## 2019-03-27 PROCEDURE — 99213 OFFICE O/P EST LOW 20 MIN: CPT | Performed by: ORTHOPAEDIC SURGERY

## 2019-03-27 NOTE — PROGRESS NOTES
"Steve Bethea is a 78 y.o. male returns for     Chief Complaint   Patient presents with   • Right Knee - Follow-up, Pain       HISTORY OF PRESENT ILLNESS: f/u right knee pain. Patient states that he is doing better is not having a lot of pain right knee. Patient states that he is not wanting to have surgery at this time. Patient has not went to get cane yet.   Pain is improved with injection.  Wants to go fishing.  Mobility is better.     CONCURRENT MEDICAL HISTORY:    The following portions of the patient's history were reviewed and updated as appropriate: allergies, current medications, past family history, past medical history, past social history, past surgical history and problem list.     ROS  No fevers or chills.  No chest pain or shortness of air.  No GI or  disturbances.    PHYSICAL EXAMINATION:       Ht 177.8 cm (70\")   Wt 79.4 kg (175 lb)   BMI 25.11 kg/m²     Physical Exam   Constitutional: He is oriented to person, place, and time. He appears well-developed and well-nourished.   Musculoskeletal:        Right knee: He exhibits no effusion.   Neurological: He is alert and oriented to person, place, and time.   Psychiatric: He has a normal mood and affect. His behavior is normal. Judgment and thought content normal.       GAIT:     []  Normal  []  Antalgic    Assistive device: [x]  None  []  Walker     []  Crutches  []  Cane     []  Wheelchair  []  Stretcher    Right Knee Exam     Tenderness   The patient is experiencing tenderness in the medial joint line and lateral joint line (Mild, diffuse).    Range of Motion   Extension: -5   Flexion: 110     Tests   Collins:  Medial - negative Lateral - negative  Varus: negative Valgus: negative    Other   Erythema: absent  Sensation: normal  Pulse: present  Swelling: mild  Effusion: no effusion present    Comments:  No effusion today  Not tender in knee today  Minimal swelling                          ASSESSMENT:    Diagnoses and all orders for this " visit:    Degenerative tear of posterior horn of medial meniscus, right    Effusion of knee joint right    Internal derangement of right knee    Primary osteoarthritis of right knee    Chronic pain of right knee          PLAN    discussed possible repeat steroid injection in right knee if symptoms worsen.  Activity as tolerated   no restrictions  Continue strength and conditioning exercises.    Return if symptoms worsen or fail to improve, for recheck.    Catalino Graham MD

## 2019-06-11 DIAGNOSIS — R93.7 ABNORMAL MRI, CERVICAL SPINE: Primary | ICD-10-CM

## 2019-10-14 ENCOUNTER — OFFICE VISIT (OUTPATIENT)
Dept: PODIATRY | Facility: CLINIC | Age: 79
End: 2019-10-14

## 2019-10-14 VITALS — WEIGHT: 175 LBS | BODY MASS INDEX: 25.05 KG/M2 | OXYGEN SATURATION: 100 % | HEART RATE: 55 BPM | HEIGHT: 70 IN

## 2019-10-14 DIAGNOSIS — M20.21 HALLUX RIGIDUS OF RIGHT FOOT: ICD-10-CM

## 2019-10-14 DIAGNOSIS — M20.41 HAMMER TOES OF BOTH FEET: Primary | ICD-10-CM

## 2019-10-14 DIAGNOSIS — M79.671 RIGHT FOOT PAIN: ICD-10-CM

## 2019-10-14 DIAGNOSIS — L84 FOOT CALLUS: ICD-10-CM

## 2019-10-14 DIAGNOSIS — M20.42 HAMMER TOES OF BOTH FEET: Primary | ICD-10-CM

## 2019-10-14 PROCEDURE — 99212 OFFICE O/P EST SF 10 MIN: CPT | Performed by: PODIATRIST

## 2019-10-14 NOTE — PROGRESS NOTES
Steve Bethea  1940  79 y.o. male    Patient presents to clinic today with complaint of right foot little toe pain states his pain is 5/10 been bothering him for a month.     10/14/2019    Chief Complaint   Patient presents with   • Right Foot - Follow-up, Pain           History of Present Illness    Steve Bethea is a 79 y.o. male who presents for f/u evaluation of right foot pain.  States pain to right great toe was improved with the injection at last visit.  Notes new problem of right fifth digit redness and sharp pain, especially close toed shoe gear.    Past Medical History:   Diagnosis Date   • Coronary arteriosclerosis    • Degenerative joint disease involving multiple joints    • Hypertension    • Plantar fasciitis    • Sleep apnea    • Upper respiratory infection          Past Surgical History:   Procedure Laterality Date   • ANGIOPLASTY  12/17/2012    x 1 stent   • COLONOSCOPY     • ENDOSCOPY N/A 5/11/2018    Procedure: ESOPHAGOGASTRODUODENOSCOPY possible dilation;  Surgeon: Praneeth Ignacio MD;  Location: [facility] ENDOSCOPY;  Service: Gastroenterology   • INJECTION OF MEDICATION  04/03/2013    Inj(s) Tend-Sheath, Ligament, Single 03499 (1)           Family History   Problem Relation Age of Onset   • Alzheimer's disease Mother    • Hypertension Mother    • Colon cancer Father    • Hypertension Father    • Stroke Other         uncle   • Heart disease Other    • Cancer Brother          Social History     Socioeconomic History   • Marital status: Single     Spouse name: Not on file   • Number of children: Not on file   • Years of education: Not on file   • Highest education level: Not on file   Tobacco Use   • Smoking status: Former Smoker   • Smokeless tobacco: Never Used   Substance and Sexual Activity   • Alcohol use: No   • Drug use: No   • Sexual activity: Defer         Current Outpatient Medications   Medication Sig Dispense Refill   • aspirin 81 MG EC tablet Take 81 mg by mouth Daily.     •  "atorvastatin (LIPITOR) 80 MG tablet Take 80 mg by mouth.     • Cholecalciferol 2000 UNITS tablet Take 2,000 Units by mouth.     • clopidogrel (PLAVIX) 75 MG tablet Take 75 mg by mouth.     • enalapril (VASOTEC) 20 MG tablet Take 20 mg by mouth.     • Flaxseed, Linseed, (FLAXSEED OIL PO) Take  by mouth.     • fluticasone (FLONASE) 50 MCG/ACT nasal spray 2 sprays into each nostril Daily. 1 bottle 0   • hydrochlorothiazide (HYDRODIURIL) 12.5 MG tablet Take 12.5 mg by mouth.     • isosorbide dinitrate (ISORDIL) 30 MG tablet Take 30 mg by mouth.     • Magnesium 250 MG tablet Take  by mouth.     • meclizine (ANTIVERT) 25 MG tablet Take 1 tablet by mouth 3 (Three) Times a Day As Needed for dizziness. 30 tablet 5   • nitroglycerin (NITROSTAT) 0.4 MG SL tablet Place 0.4 mg under the tongue Every 5 (Five) Minutes.     • Omega-3 Fatty Acids (FISH OIL PO) Take 1 capsule by mouth.     • ondansetron (ZOFRAN) 4 MG tablet Take 4 mg by mouth Every 8 (Eight) Hours As Needed for Nausea or Vomiting.     • triamterene-hydrochlorothiazide (MAXZIDE-25) 37.5-25 MG per tablet Take 1/2 tablet PO QD (Patient taking differently: Take 0.5 tablets by mouth. Take 1/2 tablet PO QD) 30 tablet 0     Current Facility-Administered Medications   Medication Dose Route Frequency Provider Last Rate Last Dose   • methylPREDNISolone acetate (DEPO-medrol) injection 80 mg  80 mg Intramuscular Once Maria Teresa Torres APRN             OBJECTIVE    Pulse 55   Ht 177.8 cm (70\")   Wt 79.4 kg (175 lb)   SpO2 100%   BMI 25.11 kg/m²       Review of Systems   Constitutional: Negative.    HENT: Negative.    Eyes: Negative.    Respiratory: Negative.    Cardiovascular: Negative.    Gastrointestinal: Negative.    Endocrine: Negative.    Genitourinary: Negative.    Musculoskeletal: Positive for arthralgias.        Foot pain   Skin: Negative.    Allergic/Immunologic: Negative.    Neurological: Negative.    Hematological: Negative.    Psychiatric/Behavioral: Negative.  "         Physical Exam   Constitutional: he appears well-developed and well-nourished.   HEENT: Normocephalic. Atraumatic  CV: No CP. RRR  Resp: Non-labored respirations  Psychiatric: he has a normal mood and affect. her behavior is normal.         Lower Extremity Exam:  Vascular: DP/PT pulses palpable 2+.   No edema  Foot warm  CFT wnl  Neuro: Protective sensation intact, b/l.  DTRs intact  Integument: No open wounds   Heloma dura formation to medial aspect of right 5th digit. +TTP  No erythema, scaling  Skin quality normal  Musculoskeletal: LE muscle strength 5/5.   Gait normal  Ankle ROM decreased without pain or crepitus  STJ ROM full without pain or crepitus  1st MTPJ ROM decreased without tenderness. Mild HAV  +Dorsomedial 1st mtpj eminence.   Semirigid hammertoe deformity 2 through 5 bilateral.              ASSESSMENT AND PLAN    Steve was seen today for follow-up and pain.    Diagnoses and all orders for this visit:    Hammer toes of both feet    Right foot pain    Foot callus    Hallux rigidus of right foot      -Comprehensive foot and ankle exam performed  -Radiographs ordered and reviewed  -Educated pt on diagnosis, etiology and treatment of hammertoe deformity with concurrent interdigital corn formation  -Recommend soft, wide toe box accommodative shoe gear.  -Dispensed toe spacers and toe sleeves.  -Debridement of above-noted hyperkeratosis with 15 blade without incident  -Recheck  as needed.          This document has been electronically signed by Osman Mckeon DPM on October 14, 2019 12:04 PM     EMR Dragon/Transcription disclaimer:   Much of this encounter note is an electronic transcription/translation of spoken language to printed text. The electronic translation of spoken language may permit erroneous, or at times, nonsensical words or phrases to be inadvertently transcribed; Although I have reviewed the note for such errors, some may still exist.    Osman Mckeon DPM  10/14/2019  12:04  PM

## 2019-12-11 ENCOUNTER — OFFICE VISIT (OUTPATIENT)
Dept: ORTHOPEDIC SURGERY | Facility: CLINIC | Age: 79
End: 2019-12-11

## 2019-12-11 VITALS — HEIGHT: 70 IN | WEIGHT: 170 LBS | BODY MASS INDEX: 24.34 KG/M2

## 2019-12-11 DIAGNOSIS — G89.29 CHRONIC PAIN OF RIGHT KNEE: ICD-10-CM

## 2019-12-11 DIAGNOSIS — M25.561 CHRONIC PAIN OF RIGHT KNEE: ICD-10-CM

## 2019-12-11 DIAGNOSIS — M17.11 PRIMARY OSTEOARTHRITIS OF RIGHT KNEE: ICD-10-CM

## 2019-12-11 DIAGNOSIS — M23.91 INTERNAL DERANGEMENT OF RIGHT KNEE: Primary | ICD-10-CM

## 2019-12-11 DIAGNOSIS — M23.321 DEGENERATIVE TEAR OF POSTERIOR HORN OF MEDIAL MENISCUS, RIGHT: ICD-10-CM

## 2019-12-11 PROCEDURE — 20610 DRAIN/INJ JOINT/BURSA W/O US: CPT | Performed by: ORTHOPAEDIC SURGERY

## 2019-12-11 RX ORDER — TRIAMCINOLONE ACETONIDE 40 MG/ML
40 INJECTION, SUSPENSION INTRA-ARTICULAR; INTRAMUSCULAR
Status: COMPLETED | OUTPATIENT
Start: 2019-12-11 | End: 2019-12-11

## 2019-12-11 RX ORDER — LIDOCAINE HYDROCHLORIDE 20 MG/ML
2 INJECTION, SOLUTION INFILTRATION; PERINEURAL
Status: COMPLETED | OUTPATIENT
Start: 2019-12-11 | End: 2019-12-11

## 2019-12-11 RX ADMIN — LIDOCAINE HYDROCHLORIDE 2 ML: 20 INJECTION, SOLUTION INFILTRATION; PERINEURAL at 08:06

## 2019-12-11 RX ADMIN — TRIAMCINOLONE ACETONIDE 40 MG: 40 INJECTION, SUSPENSION INTRA-ARTICULAR; INTRAMUSCULAR at 08:06

## 2019-12-11 NOTE — PROGRESS NOTES
"Steve Bethea is a 79 y.o. male returns for     Chief Complaint   Patient presents with   • Right Knee - Follow-up, Pain       HISTORY OF PRESENT ILLNESS: f/u right knee pain. Patient requesting evaluation for steroid injection.   No new injury  Dull aching pain most of the time.  Occasional sharp pains.       CONCURRENT MEDICAL HISTORY:    The following portions of the patient's history were reviewed and updated as appropriate: allergies, current medications, past family history, past medical history, past social history, past surgical history and problem list.     ROS  No fevers or chills.  No chest pain or shortness of air.  No GI or  disturbances.    PHYSICAL EXAMINATION:       Ht 177.8 cm (70\")   Wt 77.1 kg (170 lb)   BMI 24.39 kg/m²                   ASSESSMENT:    Diagnoses and all orders for this visit:    Internal derangement of right knee  -     Large Joint Arthrocentesis: R knee    Primary osteoarthritis of right knee  -     Large Joint Arthrocentesis: R knee    Degenerative tear of posterior horn of medial meniscus, right  -     Large Joint Arthrocentesis: R knee    Chronic pain of right knee  -     Large Joint Arthrocentesis: R knee      Large Joint Arthrocentesis: R knee  Date/Time: 12/11/2019 8:06 AM  Consent given by: patient  Site marked: site marked  Timeout: Immediately prior to procedure a time out was called to verify the correct patient, procedure, equipment, support staff and site/side marked as required   Supporting Documentation  Indications: pain   Procedure Details  Location: knee - R knee  Preparation: Patient was prepped and draped in the usual sterile fashion  Needle size: 22 G  Approach: anteromedial  Medications administered: 40 mg triamcinolone acetonide 40 MG/ML; 2 mL lidocaine 2%  Patient tolerance: patient tolerated the procedure well with no immediate complications            PLAN    Repeat steroid injection in right knee  Activity as tolerated  No true " restrictions  ROM/STR as pain allows.  F/u as needed.    Patient's Body mass index is 24.39 kg/m². BMI is within normal parameters. No follow-up required..      Return if symptoms worsen or fail to improve, for recheck.    Catalino Graham MD

## 2020-01-08 ENCOUNTER — OFFICE VISIT (OUTPATIENT)
Dept: FAMILY MEDICINE CLINIC | Facility: CLINIC | Age: 80
End: 2020-01-08

## 2020-01-08 VITALS
SYSTOLIC BLOOD PRESSURE: 102 MMHG | BODY MASS INDEX: 23.99 KG/M2 | HEART RATE: 58 BPM | TEMPERATURE: 96.5 F | WEIGHT: 167.6 LBS | DIASTOLIC BLOOD PRESSURE: 52 MMHG | HEIGHT: 70 IN

## 2020-01-08 DIAGNOSIS — Z00.00 MEDICARE ANNUAL WELLNESS VISIT, SUBSEQUENT: Primary | ICD-10-CM

## 2020-01-08 DIAGNOSIS — Z12.5 PROSTATE CANCER SCREENING: ICD-10-CM

## 2020-01-08 DIAGNOSIS — M54.2 NECK PAIN: ICD-10-CM

## 2020-01-08 DIAGNOSIS — M50.30 DDD (DEGENERATIVE DISC DISEASE), CERVICAL: ICD-10-CM

## 2020-01-08 DIAGNOSIS — I10 ESSENTIAL HYPERTENSION: ICD-10-CM

## 2020-01-08 PROCEDURE — 99214 OFFICE O/P EST MOD 30 MIN: CPT | Performed by: NURSE PRACTITIONER

## 2020-01-08 PROCEDURE — G0439 PPPS, SUBSEQ VISIT: HCPCS | Performed by: NURSE PRACTITIONER

## 2020-01-08 RX ORDER — TRAMADOL HYDROCHLORIDE 50 MG/1
50 TABLET ORAL EVERY 6 HOURS PRN
Qty: 30 TABLET | Refills: 0 | Status: SHIPPED | OUTPATIENT
Start: 2020-01-08 | End: 2020-11-24

## 2020-01-08 RX ORDER — METHYLPREDNISOLONE 4 MG/1
TABLET ORAL
Qty: 1 EACH | Refills: 0 | Status: SHIPPED | OUTPATIENT
Start: 2020-01-08 | End: 2020-01-27

## 2020-01-08 NOTE — PROGRESS NOTES
"Subjective   Steve Bethea is a 79 y.o. male. Patient here today with complaints of Neck Pain (popping and grinding feeling on right side)  pt here today with complaints of his neck \"popping and grinding\", relates his neck will \"catch\" and is painful at times. Denies radicular symptoms of numbness, tingling, pain to BUE. Reports chronic overuse but denies known injury. States \" I don't want surgery\". States his neck is \"irritating\" him , worsening.     Vitals:    01/08/20 1014   BP: 102/52   Pulse: 58   Temp: 96.5 °F (35.8 °C)   Weight: 76 kg (167 lb 9.6 oz)   Height: 177.8 cm (70\")   PainSc: 0-No pain     Body mass index is 24.05 kg/m².  Past Medical History:   Diagnosis Date   • Coronary arteriosclerosis    • Degenerative joint disease involving multiple joints    • Hypertension    • Plantar fasciitis    • Sleep apnea    • Upper respiratory infection      Neck Pain    This is a chronic problem. The current episode started more than 1 year ago. The problem occurs constantly. The problem has been waxing and waning. The pain is associated with nothing. The quality of the pain is described as aching. The pain is moderate. The symptoms are aggravated by bending, position and twisting. Stiffness is present all day. He has tried NSAIDs and acetaminophen for the symptoms. The treatment provided no relief.        The following portions of the patient's history were reviewed and updated as appropriate: allergies, current medications, past family history, past medical history, past social history, past surgical history and problem list.    Review of Systems   Constitutional: Negative.    HENT: Negative.    Eyes: Negative.    Respiratory: Negative.    Cardiovascular: Negative.    Gastrointestinal: Negative.    Endocrine: Negative.    Genitourinary: Negative.    Musculoskeletal: Positive for neck pain.   Skin: Negative.    Allergic/Immunologic: Negative.    Neurological: Negative.    Hematological: Negative.  "   Psychiatric/Behavioral: Negative.        Objective   Physical Exam   Constitutional: He is oriented to person, place, and time. He appears well-developed and well-nourished. No distress.   HENT:   Head: Normocephalic and atraumatic.   Cardiovascular: Normal rate, regular rhythm and normal heart sounds. Exam reveals no gallop and no friction rub.   No murmur heard.  Pulmonary/Chest: Effort normal and breath sounds normal. No stridor. No respiratory distress. He has no wheezes. He has no rales.   Musculoskeletal: He exhibits tenderness.        Cervical back: He exhibits decreased range of motion, tenderness and bony tenderness. He exhibits no spasm and normal pulse.   Neurological: He is alert and oriented to person, place, and time.   Skin: Skin is warm and dry. No rash noted. He is not diaphoretic. No erythema. No pallor.   Psychiatric: He has a normal mood and affect. His behavior is normal.   Nursing note and vitals reviewed.      Assessment/Plan   Steve was seen today for neck pain.    Diagnoses and all orders for this visit:    Medicare annual wellness visit, subsequent    Essential hypertension  -     CBC & Differential  -     Comprehensive Metabolic Panel  -     Lipid Panel  -     T3, Free  -     T4  -     TSH    Prostate cancer screening  -     PSA Screen    DDD (degenerative disc disease), cervical  -     XR Spine Cervical 2 or 3 View  -     traMADol (ULTRAM) 50 MG tablet; Take 1 tablet by mouth Every 6 (Six) Hours As Needed for Moderate Pain .  -     Ambulatory Referral to Physical Therapy Evaluate and treat    Neck pain  -     XR Spine Cervical 2 or 3 View  -     traMADol (ULTRAM) 50 MG tablet; Take 1 tablet by mouth Every 6 (Six) Hours As Needed for Moderate Pain .  -     Ambulatory Referral to Physical Therapy Evaluate and treat    Other orders  -     methylPREDNISolone (MEDROL, DINESH,) 4 MG tablet; Take as directed on package instructions.    shayy is obtained and reviewed  Will repeat c/s xray    Previous C/S xray and MRI are reviewed from last year  He is referred to PT for evaluation and treatment  Will treat with medrol dose pk, advised on potential side effects of medicine  Will treat with ultram prn pain as above  Will obtain labs since due as well, will inform of results of above per phone  He is aware and is in agreement to this plan  All questions and concerns are addressed with understanding noted.   JARETT query complete. Treatment plan to include limited course of prescribed controlled substance. Risks including addiction, benefits, and alternatives presented to patient.   Subsequent medicare wellness completed today as well, immunizations are reviewed, offered but declined today.

## 2020-01-08 NOTE — PROGRESS NOTES
The ABCs of the Annual Wellness Visit  Subsequent Medicare Wellness Visit    Chief Complaint   Patient presents with   • Neck Pain     popping and grinding feeling on right side       Subjective   History of Present Illness:  Steve Bethea is a 79 y.o. male who presents for a Subsequent Medicare Wellness Visit.    HEALTH RISK ASSESSMENT    Recent Hospitalizations:  No hospitalization(s) within the last year.    Current Medical Providers:  Patient Care Team:  Maria Teresa Torres APRN as PCP - General (Family Medicine)    Smoking Status:  Social History     Tobacco Use   Smoking Status Former Smoker   Smokeless Tobacco Never Used       Alcohol Consumption:  Social History     Substance and Sexual Activity   Alcohol Use No       Depression Screen:   PHQ-2/PHQ-9 Depression Screening 1/8/2020   Little interest or pleasure in doing things 0   Feeling down, depressed, or hopeless 0   Total Score 0       Fall Risk Screen:  SHARONADI Fall Risk Assessment has not been completed.    Health Habits and Functional and Cognitive Screening:  Functional & Cognitive Status 1/8/2020   Do you have difficulty preparing food and eating? No   Do you have difficulty bathing yourself, getting dressed or grooming yourself? No   Do you have difficulty using the toilet? No   Do you have difficulty moving around from place to place? No   Do you have trouble with steps or getting out of a bed or a chair? No   Current Diet Well Balanced Diet   Dental Exam Not up to date   Eye Exam Not up to date   Exercise (times per week) 3 times per week   Current Exercise Activities Include Walking   Do you need help using the phone?  No   Are you deaf or do you have serious difficulty hearing?  No   Do you need help with transportation? No   Do you need help shopping? No   Do you need help preparing meals?  No   Do you need help with housework?  No   Do you need help with laundry? No   Do you need help taking your medications? No   Do you need help managing  money? No   Do you ever drive or ride in a car without wearing a seat belt? No   Have you felt unusual stress, anger or loneliness in the last month? No   Who do you live with? Alone   If you need help, do you have trouble finding someone available to you? Yes   Have you been bothered in the last four weeks by sexual problems? No   Do you have difficulty concentrating, remembering or making decisions? No         Does the patient have evidence of cognitive impairment? No    Asprin use counseling:Taking ASA appropriately as indicated    Age-appropriate Screening Schedule:  Refer to the list below for future screening recommendations based on patient's age, sex and/or medical conditions. Orders for these recommended tests are listed in the plan section. The patient has been provided with a written plan.    Health Maintenance   Topic Date Due   • ZOSTER VACCINE (2 of 2) 04/17/2018   • COLONOSCOPY  05/11/2020   • LIPID PANEL  05/22/2020   • TDAP/TD VACCINES (2 - Tdap) 06/18/2025   • INFLUENZA VACCINE  Completed          The following portions of the patient's history were reviewed and updated as appropriate: allergies, current medications, past family history, past medical history, past social history, past surgical history and problem list.    Outpatient Medications Prior to Visit   Medication Sig Dispense Refill   • aspirin 81 MG EC tablet Take 81 mg by mouth Daily.     • atorvastatin (LIPITOR) 80 MG tablet Take 80 mg by mouth.     • Cholecalciferol 2000 UNITS tablet Take 2,000 Units by mouth.     • clopidogrel (PLAVIX) 75 MG tablet Take 75 mg by mouth.     • enalapril (VASOTEC) 20 MG tablet Take 20 mg by mouth.     • Flaxseed, Linseed, (FLAXSEED OIL PO) Take  by mouth.     • fluticasone (FLONASE) 50 MCG/ACT nasal spray 2 sprays into each nostril Daily. 1 bottle 0   • hydrochlorothiazide (HYDRODIURIL) 12.5 MG tablet Take 12.5 mg by mouth.     • isosorbide dinitrate (ISORDIL) 30 MG tablet Take 30 mg by mouth.     •  "Magnesium 250 MG tablet Take  by mouth.     • meclizine (ANTIVERT) 25 MG tablet Take 1 tablet by mouth 3 (Three) Times a Day As Needed for dizziness. 30 tablet 5   • nitroglycerin (NITROSTAT) 0.4 MG SL tablet Place 0.4 mg under the tongue Every 5 (Five) Minutes.     • Omega-3 Fatty Acids (FISH OIL PO) Take 1 capsule by mouth.     • ondansetron (ZOFRAN) 4 MG tablet Take 4 mg by mouth Every 8 (Eight) Hours As Needed for Nausea or Vomiting.     • triamterene-hydrochlorothiazide (MAXZIDE-25) 37.5-25 MG per tablet Take 1/2 tablet PO QD (Patient taking differently: Take 0.5 tablets by mouth. Take 1/2 tablet PO QD) 30 tablet 0     Facility-Administered Medications Prior to Visit   Medication Dose Route Frequency Provider Last Rate Last Dose   • methylPREDNISolone acetate (DEPO-medrol) injection 80 mg  80 mg Intramuscular Once Maria Teresa Torres APRN           Patient Active Problem List   Diagnosis   • Acute non-recurrent maxillary sinusitis   • Hypertension   • Coronary arteriosclerosis   • Weight loss   • Bilious vomiting with nausea   • Gastroesophageal reflux disease   • Degenerative joint disease involving multiple joints   • Chronic pain of right knee   • Effusion of knee joint right   • Internal derangement of right knee   • Primary osteoarthritis of right knee   • Degenerative tear of posterior horn of medial meniscus, right       Advanced Care Planning:  Patient does not have an advance directive - not interested in additional information    Review of Systems    Compared to one year ago, the patient feels his physical health is the same.  Compared to one year ago, the patient feels his mental health is the same.    Reviewed chart for potential of high risk medication in the elderly: yes  Reviewed chart for potential of harmful drug interactions in the elderly:yes    Objective         Vitals:    01/08/20 1014   BP: 102/52   Pulse: 58   Temp: 96.5 °F (35.8 °C)   Weight: 76 kg (167 lb 9.6 oz)   Height: 177.8 cm (70\") "   PainSc: 0-No pain       Body mass index is 24.05 kg/m².  Discussed the patient's BMI with him. The BMI is in the acceptable range.    Physical Exam          Assessment/Plan   Medicare Risks and Personalized Health Plan  CMS Preventative Services Quick Reference  Advance Directive Discussion  Hearing Problem  Immunizations Discussed/Encouraged (specific immunizations; Influenza, Pneumococcal 23, Prevnar and Shingrix )  Polypharmacy    The above risks/problems have been discussed with the patient.  Pertinent information has been shared with the patient in the After Visit Summary.  Follow up plans and orders are seen below in the Assessment/Plan Section.    Diagnoses and all orders for this visit:    1. Medicare annual wellness visit, subsequent (Primary)    2. Essential hypertension  -     CBC & Differential  -     Comprehensive Metabolic Panel  -     Lipid Panel  -     T3, Free  -     T4  -     TSH    3. Prostate cancer screening  -     PSA Screen    4. DDD (degenerative disc disease), cervical  -     XR Spine Cervical 2 or 3 View  -     traMADol (ULTRAM) 50 MG tablet; Take 1 tablet by mouth Every 6 (Six) Hours As Needed for Moderate Pain .  Dispense: 30 tablet; Refill: 0  -     Ambulatory Referral to Physical Therapy Evaluate and treat    5. Neck pain  -     XR Spine Cervical 2 or 3 View  -     traMADol (ULTRAM) 50 MG tablet; Take 1 tablet by mouth Every 6 (Six) Hours As Needed for Moderate Pain .  Dispense: 30 tablet; Refill: 0  -     Ambulatory Referral to Physical Therapy Evaluate and treat    Other orders  -     methylPREDNISolone (MEDROL, DINESH,) 4 MG tablet; Take as directed on package instructions.  Dispense: 1 each; Refill: 0      Follow Up:  Return if symptoms worsen or fail to improve, for or sooner as needed, Next scheduled follow up.     An After Visit Summary and PPPS were given to the patient.

## 2020-01-27 ENCOUNTER — LAB (OUTPATIENT)
Dept: LAB | Facility: OTHER | Age: 80
End: 2020-01-27

## 2020-01-27 ENCOUNTER — OFFICE VISIT (OUTPATIENT)
Dept: FAMILY MEDICINE CLINIC | Facility: CLINIC | Age: 80
End: 2020-01-27

## 2020-01-27 VITALS
BODY MASS INDEX: 24.22 KG/M2 | TEMPERATURE: 97.5 F | HEART RATE: 70 BPM | WEIGHT: 169.2 LBS | DIASTOLIC BLOOD PRESSURE: 72 MMHG | SYSTOLIC BLOOD PRESSURE: 122 MMHG | HEIGHT: 70 IN

## 2020-01-27 DIAGNOSIS — R73.9 HYPERGLYCEMIA: ICD-10-CM

## 2020-01-27 DIAGNOSIS — M79.674 PAIN IN TOE OF RIGHT FOOT: ICD-10-CM

## 2020-01-27 DIAGNOSIS — M79.671 RIGHT FOOT PAIN: ICD-10-CM

## 2020-01-27 DIAGNOSIS — M20.41 HAMMER TOE OF SECOND TOE OF RIGHT FOOT: Primary | ICD-10-CM

## 2020-01-27 LAB
ALBUMIN SERPL-MCNC: 3.9 G/DL (ref 3.5–5)
ALBUMIN/GLOB SERPL: 1.5 G/DL (ref 1.1–1.8)
ALP SERPL-CCNC: 69 U/L (ref 38–126)
ALT SERPL W P-5'-P-CCNC: 22 U/L
ANION GAP SERPL CALCULATED.3IONS-SCNC: 6 MMOL/L (ref 5–15)
AST SERPL-CCNC: 26 U/L (ref 17–59)
BASOPHILS # BLD AUTO: 0.02 10*3/MM3 (ref 0–0.2)
BASOPHILS NFR BLD AUTO: 0.3 % (ref 0–1.5)
BILIRUB SERPL-MCNC: 1 MG/DL (ref 0.2–1.3)
BILIRUB UR QL STRIP: ABNORMAL
BUN BLD-MCNC: 23 MG/DL (ref 7–23)
BUN/CREAT SERPL: 28 (ref 7–25)
CALCIUM SPEC-SCNC: 9.2 MG/DL (ref 8.4–10.2)
CHLORIDE SERPL-SCNC: 107 MMOL/L (ref 101–112)
CHOLEST SERPL-MCNC: 133 MG/DL (ref 150–200)
CLARITY UR: CLEAR
CO2 SERPL-SCNC: 28 MMOL/L (ref 22–30)
COLOR UR: ABNORMAL
CREAT BLD-MCNC: 0.82 MG/DL (ref 0.7–1.3)
DEPRECATED RDW RBC AUTO: 46.4 FL (ref 37–54)
EOSINOPHIL # BLD AUTO: 0.06 10*3/MM3 (ref 0–0.4)
EOSINOPHIL NFR BLD AUTO: 0.9 % (ref 0.3–6.2)
ERYTHROCYTE [DISTWIDTH] IN BLOOD BY AUTOMATED COUNT: 13.4 % (ref 12.3–15.4)
GFR SERPL CREATININE-BSD FRML MDRD: 91 ML/MIN/1.73 (ref 42–98)
GLOBULIN UR ELPH-MCNC: 2.6 GM/DL (ref 2.3–3.5)
GLUCOSE BLD-MCNC: 97 MG/DL (ref 70–99)
GLUCOSE UR STRIP-MCNC: NEGATIVE MG/DL
HCT VFR BLD AUTO: 44.3 % (ref 37.5–51)
HDLC SERPL-MCNC: 64 MG/DL (ref 40–59)
HGB BLD-MCNC: 14.5 G/DL (ref 13–17.7)
HGB UR QL STRIP.AUTO: NEGATIVE
KETONES UR QL STRIP: NEGATIVE
LDLC SERPL CALC-MCNC: 55 MG/DL
LDLC/HDLC SERPL: 0.85 {RATIO} (ref 0–3.55)
LEUKOCYTE ESTERASE UR QL STRIP.AUTO: NEGATIVE
LYMPHOCYTES # BLD AUTO: 1.73 10*3/MM3 (ref 0.7–3.1)
LYMPHOCYTES NFR BLD AUTO: 24.8 % (ref 19.6–45.3)
MCH RBC QN AUTO: 31.5 PG (ref 26.6–33)
MCHC RBC AUTO-ENTMCNC: 32.7 G/DL (ref 31.5–35.7)
MCV RBC AUTO: 96.1 FL (ref 79–97)
MONOCYTES # BLD AUTO: 0.65 10*3/MM3 (ref 0.1–0.9)
MONOCYTES NFR BLD AUTO: 9.3 % (ref 5–12)
NEUTROPHILS # BLD AUTO: 4.52 10*3/MM3 (ref 1.7–7)
NEUTROPHILS NFR BLD AUTO: 64.7 % (ref 42.7–76)
NITRITE UR QL STRIP: NEGATIVE
PH UR STRIP.AUTO: <=5 [PH] (ref 5.5–8)
PLATELET # BLD AUTO: 186 10*3/MM3 (ref 140–450)
PMV BLD AUTO: 10.8 FL (ref 6–12)
POTASSIUM BLD-SCNC: 3.8 MMOL/L (ref 3.4–5)
PROT SERPL-MCNC: 6.5 G/DL (ref 6.3–8.6)
PROT UR QL STRIP: NEGATIVE
RBC # BLD AUTO: 4.61 10*6/MM3 (ref 4.14–5.8)
SODIUM BLD-SCNC: 141 MMOL/L (ref 137–145)
SP GR UR STRIP: >=1.03 (ref 1–1.03)
TRIGL SERPL-MCNC: 72 MG/DL
UROBILINOGEN UR QL STRIP: ABNORMAL
VLDLC SERPL-MCNC: 14.4 MG/DL
WBC NRBC COR # BLD: 6.98 10*3/MM3 (ref 3.4–10.8)

## 2020-01-27 PROCEDURE — 80053 COMPREHEN METABOLIC PANEL: CPT | Performed by: NURSE PRACTITIONER

## 2020-01-27 PROCEDURE — 85025 COMPLETE CBC W/AUTO DIFF WBC: CPT | Performed by: NURSE PRACTITIONER

## 2020-01-27 PROCEDURE — 84443 ASSAY THYROID STIM HORMONE: CPT | Performed by: NURSE PRACTITIONER

## 2020-01-27 PROCEDURE — 84436 ASSAY OF TOTAL THYROXINE: CPT | Performed by: NURSE PRACTITIONER

## 2020-01-27 PROCEDURE — 84481 FREE ASSAY (FT-3): CPT | Performed by: NURSE PRACTITIONER

## 2020-01-27 PROCEDURE — 81003 URINALYSIS AUTO W/O SCOPE: CPT | Performed by: NURSE PRACTITIONER

## 2020-01-27 PROCEDURE — 99213 OFFICE O/P EST LOW 20 MIN: CPT | Performed by: NURSE PRACTITIONER

## 2020-01-27 PROCEDURE — 36415 COLL VENOUS BLD VENIPUNCTURE: CPT | Performed by: NURSE PRACTITIONER

## 2020-01-27 PROCEDURE — 83036 HEMOGLOBIN GLYCOSYLATED A1C: CPT | Performed by: NURSE PRACTITIONER

## 2020-01-27 PROCEDURE — G0103 PSA SCREENING: HCPCS | Performed by: NURSE PRACTITIONER

## 2020-01-27 PROCEDURE — 80061 LIPID PANEL: CPT | Performed by: NURSE PRACTITIONER

## 2020-01-28 DIAGNOSIS — R97.20 ELEVATED PSA: Primary | ICD-10-CM

## 2020-01-28 DIAGNOSIS — M54.2 NECK PAIN: ICD-10-CM

## 2020-01-28 DIAGNOSIS — R93.7 ABNORMAL MRI, CERVICAL SPINE: Primary | ICD-10-CM

## 2020-01-28 LAB
HBA1C MFR BLD: 5.73 % (ref 4.8–5.6)
PSA SERPL-MCNC: 4.23 NG/ML (ref 0–4)
T3FREE SERPL-MCNC: 3.29 PG/ML (ref 2–4.4)
T4 SERPL-MCNC: 5.25 MCG/DL (ref 4.5–11.7)
TSH SERPL DL<=0.05 MIU/L-ACNC: 1.4 UIU/ML (ref 0.27–4.2)

## 2020-01-31 NOTE — PROGRESS NOTES
"Subjective   Steve Bethea is a 79 y.o. male. Patient here today with complaints of Nail Problem  Patient here today with complaints of right foot pain, mainly second toe where he has a hammertoe.  He states that he has used over-the-counter spacers and pads which have not helped, he has seen podiatry in the past.  States his toes will \"rub together \"and \"ache like a toothache \".  His main concern is that he is worried about having diabetes due to his symptoms.  Patient continues to have neck pain    Vitals:    01/27/20 0916   BP: 122/72   Pulse: 70   Temp: 97.5 °F (36.4 °C)   Weight: 76.7 kg (169 lb 3.2 oz)   Height: 177.8 cm (70\")     Body mass index is 24.28 kg/m².  Past Medical History:   Diagnosis Date   • Coronary arteriosclerosis    • Degenerative joint disease involving multiple joints    • Hypertension    • Plantar fasciitis    • Sleep apnea    • Upper respiratory infection      Foot Injury    There was no injury mechanism. The pain is present in the right foot and right toes. The quality of the pain is described as aching. The pain is mild. The pain has been constant since onset. Associated symptoms include an inability to bear weight and a loss of motion. Pertinent negatives include no numbness or tingling. He reports no foreign bodies present. The symptoms are aggravated by movement, weight bearing and palpation. He has tried rest and non-weight bearing for the symptoms. The treatment provided no relief.        The following portions of the patient's history were reviewed and updated as appropriate: allergies, current medications, past family history, past medical history, past social history, past surgical history and problem list.    Review of Systems   Constitutional: Negative.    HENT: Negative.    Eyes: Negative.    Respiratory: Negative.    Cardiovascular: Negative.    Gastrointestinal: Negative.    Endocrine: Negative.    Genitourinary: Negative.    Musculoskeletal: Positive for arthralgias. "   Skin: Negative.    Allergic/Immunologic: Negative.    Neurological: Negative.  Negative for tingling and numbness.   Hematological: Negative.    Psychiatric/Behavioral: Negative.        Objective   Physical Exam   Constitutional: He is oriented to person, place, and time. He appears well-developed and well-nourished. No distress.   HENT:   Head: Normocephalic and atraumatic.   Neck: Spinous process tenderness and muscular tenderness present. No neck rigidity. Decreased range of motion present.   Cardiovascular: Normal rate, regular rhythm, normal heart sounds and intact distal pulses. Exam reveals no gallop and no friction rub.   No murmur heard.  Pulmonary/Chest: Effort normal and breath sounds normal. No stridor. No respiratory distress. He has no wheezes. He has no rales.   Musculoskeletal: He exhibits tenderness.        Right foot: There is decreased range of motion, tenderness, bony tenderness and swelling.   Patient ambulatory without assistance but gait guarded        Neurological: He is alert and oriented to person, place, and time.   Skin: Skin is warm and dry. He is not diaphoretic.   Nursing note and vitals reviewed.      Assessment/Plan   Steve was seen today for nail problem.    Diagnoses and all orders for this visit:    Hammer toe of second toe of right foot    Hyperglycemia  -     Hemoglobin A1c; Future  -     Urinalysis With Culture If Indicated -; Future    Right foot pain    Pain in toe of right foot    I will obtain labs due to his concern for diabetes, urinalysis and hemoglobin A1c obtained and he will be informed of results via phone, he is advised to see podiatry again in follow-up.  Otherwise follow-up here as scheduled for chronic conditions.  Patient aware and in agreement to this plan.  If neck pain continues may need referral on to physical therapy/MRI etc.  Patient aware.  Will let me know.  All questions and concerns are addressed with understanding noted.

## 2020-02-03 ENCOUNTER — OFFICE VISIT (OUTPATIENT)
Dept: FAMILY MEDICINE CLINIC | Facility: CLINIC | Age: 80
End: 2020-02-03

## 2020-02-03 VITALS
DIASTOLIC BLOOD PRESSURE: 68 MMHG | SYSTOLIC BLOOD PRESSURE: 118 MMHG | OXYGEN SATURATION: 98 % | WEIGHT: 169 LBS | TEMPERATURE: 97.4 F | HEIGHT: 70 IN | BODY MASS INDEX: 24.2 KG/M2 | HEART RATE: 74 BPM

## 2020-02-03 DIAGNOSIS — M25.78 OSTEOPHYTE OF CERVICAL SPINE: ICD-10-CM

## 2020-02-03 DIAGNOSIS — M50.30 DDD (DEGENERATIVE DISC DISEASE), CERVICAL: Primary | ICD-10-CM

## 2020-02-03 DIAGNOSIS — R97.20 ELEVATED PSA: ICD-10-CM

## 2020-02-03 PROCEDURE — 99213 OFFICE O/P EST LOW 20 MIN: CPT | Performed by: NURSE PRACTITIONER

## 2020-02-03 NOTE — PROGRESS NOTES
"Subjective   Steve Bethea is a 79 y.o. male. Patient here today with complaints of Consultation over labs  pt walks up wanting to be seen today for test results, lab and c/s xray results. He had previously been referred to PT which he states he wants to hold at present. States he would like to be referred to neurosurg however he has not yet had MRI, continues to complain of neck pain and \"popping\". He states he has \"not heard from anyone about my test results or referrals\" however he also reports that he does not answer his phone due to spam calls. He has also not answered call from urology office, states he will call and make his own appointment for elevated PSA. He presents with friend today.     Vitals:    02/03/20 0959   BP: 118/68   Pulse: 74   Temp: 97.4 °F (36.3 °C)   TempSrc: Oral   SpO2: 98%   Weight: 76.7 kg (169 lb)   Height: 177.8 cm (70\")     Body mass index is 24.25 kg/m².  Past Medical History:   Diagnosis Date   • Coronary arteriosclerosis    • Degenerative joint disease involving multiple joints    • Hypertension    • Plantar fasciitis    • Sleep apnea    • Upper respiratory infection      Neck Pain    This is a chronic problem. The current episode started more than 1 year ago. The problem occurs constantly. The problem has been waxing and waning. The pain is associated with a twisting injury. The quality of the pain is described as shooting and aching. The pain is moderate. The symptoms are aggravated by bending, position and twisting. He has tried NSAIDs, chiropractic manipulation, acetaminophen, bed rest and oral narcotics for the symptoms. The treatment provided mild relief.        The following portions of the patient's history were reviewed and updated as appropriate: allergies, current medications, past family history, past medical history, past social history, past surgical history and problem list.    Review of Systems   Constitutional: Negative.    HENT: Negative.    Eyes: Negative.  "   Respiratory: Negative.    Cardiovascular: Negative.    Gastrointestinal: Negative.    Endocrine: Negative.    Genitourinary: Negative.    Musculoskeletal: Positive for neck pain.   Skin: Negative.    Allergic/Immunologic: Negative.    Neurological: Negative.    Hematological: Negative.    Psychiatric/Behavioral: Negative.        Objective   Physical Exam   Constitutional: He is oriented to person, place, and time. He appears well-developed and well-nourished.   HENT:   Head: Normocephalic and atraumatic.   Neck: No neck rigidity. Decreased range of motion present.   Cardiovascular: Normal rate.   Pulmonary/Chest: Effort normal.   Musculoskeletal: He exhibits tenderness.   Neurological: He is alert and oriented to person, place, and time.   Skin: Skin is warm and dry.   Psychiatric: His speech is rapid and/or pressured.   Nursing note and vitals reviewed.      Assessment/Plan   Steve was seen today for consultation over labs.    Diagnoses and all orders for this visit:    DDD (degenerative disc disease), cervical  -     MRI Cervical Spine Without Contrast; Future    Osteophyte of cervical spine  -     MRI Cervical Spine Without Contrast; Future    Elevated PSA  Comments:  is going to follow up with urology and states will call on his own for appointment since he has missed their calls previously to schedule    previous and most recent labs are reviewed in office, in person with him and his friend today, he seems to understand answers to all questions  He is given number to urology's office and states is going to call on his own for make appointment   He states his main concern is neck pain, declines PT , is scheduled to have MRI neck and he is strongly advised to answer his phone because someone from [facility] will be calling to schedule appointment  Also , we will call with results of MRI and make referral as indicated  Again, encouraged pt to answer his phone so that he will be able to obtain results over phone  and so that he will know when appointments are scheduled for him  He appears to be aware and in agreement to this plan as does his friend  All questions and concerns are addressed with understanding noted.

## 2020-02-06 ENCOUNTER — HOSPITAL ENCOUNTER (OUTPATIENT)
Dept: MRI IMAGING | Facility: HOSPITAL | Age: 80
Discharge: HOME | End: 2020-02-06
Admitting: NURSE PRACTITIONER

## 2020-02-06 DIAGNOSIS — M25.78 OSTEOPHYTE OF CERVICAL SPINE: ICD-10-CM

## 2020-02-06 DIAGNOSIS — M50.30 DDD (DEGENERATIVE DISC DISEASE), CERVICAL: ICD-10-CM

## 2020-02-06 PROCEDURE — 72141 MRI NECK SPINE W/O DYE: CPT

## 2020-02-07 DIAGNOSIS — M54.2 NECK PAIN: ICD-10-CM

## 2020-02-07 DIAGNOSIS — R93.7 ABNORMAL MRI, CERVICAL SPINE: Primary | ICD-10-CM

## 2020-02-13 ENCOUNTER — OFFICE VISIT (OUTPATIENT)
Dept: FAMILY MEDICINE CLINIC | Facility: CLINIC | Age: 80
End: 2020-02-13

## 2020-02-13 VITALS
DIASTOLIC BLOOD PRESSURE: 68 MMHG | HEIGHT: 70 IN | TEMPERATURE: 97.4 F | BODY MASS INDEX: 23.88 KG/M2 | OXYGEN SATURATION: 98 % | SYSTOLIC BLOOD PRESSURE: 120 MMHG | HEART RATE: 51 BPM | WEIGHT: 166.8 LBS

## 2020-02-13 DIAGNOSIS — Z12.11 ENCOUNTER FOR SCREENING COLONOSCOPY: ICD-10-CM

## 2020-02-13 DIAGNOSIS — R20.0 NUMBNESS IN BOTH HANDS: Primary | ICD-10-CM

## 2020-02-13 DIAGNOSIS — M54.2 NECK PAIN: ICD-10-CM

## 2020-02-13 DIAGNOSIS — M25.78 OSTEOPHYTE OF CERVICAL SPINE: ICD-10-CM

## 2020-02-13 DIAGNOSIS — Z80.0 FAMILY HISTORY OF COLON CANCER IN FATHER: ICD-10-CM

## 2020-02-13 DIAGNOSIS — R20.0 NUMBNESS IN FEET: ICD-10-CM

## 2020-02-13 PROCEDURE — 99213 OFFICE O/P EST LOW 20 MIN: CPT | Performed by: NURSE PRACTITIONER

## 2020-02-13 NOTE — PROGRESS NOTES
"Subjective   tSeve Bethea is a 79 y.o. male. Patient here today with complaints of Numbness (both hands and feet, since last night )  pt here today with complaints of bilat hand and bilat feet numbness off and on x months. States he does sleep in a \"cold room at night\", reports does not sleep well. He has not yet seen PT or neurosurg, had MRI c/s which revealed cervical osteophytes, DDD, and is awaiting appointment with neurosurgery. He also was informed his PSA was elevated and is seeing urology on 2-27-20. He also today states he thinks his colonoscopy is due, dad had colon cancer, his last colonoscopy was with dr keen and looking through his records it does appear that he is due in May 2020 for repeat scope.     Vitals:    02/13/20 1328   BP: 120/68   Pulse: 51   Temp: 97.4 °F (36.3 °C)   TempSrc: Tympanic   SpO2: 98%   Weight: 75.7 kg (166 lb 12.8 oz)   Height: 177.8 cm (70\")     Body mass index is 23.93 kg/m².  Past Medical History:   Diagnosis Date   • Coronary arteriosclerosis    • Degenerative joint disease involving multiple joints    • Hypertension    • Plantar fasciitis    • Sleep apnea    • Upper respiratory infection      Neck Pain    This is a chronic problem. The current episode started more than 1 year ago. The problem occurs constantly. The problem has been unchanged. The pain is associated with an unknown factor. The pain is present in the occipital region. The quality of the pain is described as aching. The pain is moderate. The symptoms are aggravated by twisting and bending. Associated symptoms include numbness and paresis. He has tried acetaminophen and NSAIDs for the symptoms. The treatment provided no relief.        The following portions of the patient's history were reviewed and updated as appropriate: allergies, current medications, past family history, past medical history, past social history, past surgical history and problem list.    Review of Systems   Constitutional: " Negative.    HENT: Negative.    Eyes: Negative.    Respiratory: Negative.    Cardiovascular: Negative.    Gastrointestinal: Negative.    Endocrine: Negative.    Genitourinary: Negative.    Musculoskeletal: Positive for neck pain.   Skin: Negative.    Allergic/Immunologic: Negative.    Neurological: Positive for numbness.   Hematological: Negative.    Psychiatric/Behavioral: Negative.        Objective   Physical Exam   Constitutional: He is oriented to person, place, and time. He appears well-developed and well-nourished. No distress.   HENT:   Head: Normocephalic and atraumatic.   Neck: Neck supple.   Cardiovascular: Normal rate, regular rhythm, normal heart sounds and intact distal pulses. Exam reveals no gallop and no friction rub.   No murmur heard.  Pulses:       Radial pulses are 2+ on the right side, and 2+ on the left side.        Posterior tibial pulses are 2+ on the right side, and 2+ on the left side.   Pulmonary/Chest: Effort normal and breath sounds normal. No stridor. No respiratory distress. He has no wheezes. He has no rales.   Musculoskeletal: He exhibits tenderness.   Neurological: He is alert and oriented to person, place, and time.   Skin: Skin is warm and dry. No rash noted. He is not diaphoretic. No erythema. No pallor.   Psychiatric: He has a normal mood and affect. His behavior is normal.   Nursing note and vitals reviewed.      Assessment/Plan   Steve was seen today for numbness.    Diagnoses and all orders for this visit:    Numbness in both hands    Encounter for screening colonoscopy  -     Ambulatory Referral to General Surgery    Family history of colon cancer in father  -     Ambulatory Referral to General Surgery    Neck pain    Numbness in feet    Osteophyte of cervical spine  Comments:  seeing neurosurg in near future, has already been referred     advised pt to follow up with neurosurg  He is referred to general surgery for colonoscopy  We discussed potentially starting on  neurontin but wants to wait on starting any additional medicine   He is aware and is in agreement to this plan  All questions and concerns are addressed with understanding noted.

## 2020-05-01 ENCOUNTER — LAB (OUTPATIENT)
Dept: LAB | Facility: OTHER | Age: 80
End: 2020-05-01

## 2020-05-01 ENCOUNTER — TRANSCRIBE ORDERS (OUTPATIENT)
Dept: LAB | Facility: OTHER | Age: 80
End: 2020-05-01

## 2020-05-01 DIAGNOSIS — Z79.899 ENCOUNTER FOR LONG-TERM (CURRENT) USE OF OTHER MEDICATIONS: ICD-10-CM

## 2020-05-01 DIAGNOSIS — I27.20 PULMONARY HYPERTENSION, UNSPECIFIED (HCC): ICD-10-CM

## 2020-05-01 DIAGNOSIS — I27.20 PULMONARY HYPERTENSION, UNSPECIFIED (HCC): Primary | ICD-10-CM

## 2020-05-01 LAB
ANION GAP SERPL CALCULATED.3IONS-SCNC: 8 MMOL/L (ref 5–15)
BUN BLD-MCNC: 18 MG/DL (ref 7–23)
BUN/CREAT SERPL: 22.8 (ref 7–25)
CALCIUM SPEC-SCNC: 9 MG/DL (ref 8.4–10.2)
CHLORIDE SERPL-SCNC: 105 MMOL/L (ref 101–112)
CO2 SERPL-SCNC: 27 MMOL/L (ref 22–30)
CREAT BLD-MCNC: 0.79 MG/DL (ref 0.7–1.3)
GFR SERPL CREATININE-BSD FRML MDRD: 95 ML/MIN/1.73 (ref 42–98)
GLUCOSE BLD-MCNC: 101 MG/DL (ref 70–99)
POTASSIUM BLD-SCNC: 4.1 MMOL/L (ref 3.4–5)
SODIUM BLD-SCNC: 140 MMOL/L (ref 137–145)

## 2020-05-01 PROCEDURE — 36415 COLL VENOUS BLD VENIPUNCTURE: CPT | Performed by: INTERNAL MEDICINE

## 2020-05-01 PROCEDURE — 80048 BASIC METABOLIC PNL TOTAL CA: CPT | Performed by: INTERNAL MEDICINE

## 2020-07-22 ENCOUNTER — TELEPHONE (OUTPATIENT)
Dept: ORTHOPEDIC SURGERY | Facility: CLINIC | Age: 80
End: 2020-07-22

## 2020-07-22 NOTE — TELEPHONE ENCOUNTER
KRISTAN        Pt CALLED AND IS REQUESTING TO START SEEING YOU INSTEAD OF DR MENDEZ FOR JEREMIAH HIP PAIN     Pt LAST SAW DR MENDEZ 12/11/2019 FOR RT KNEE    Pt STATES HE DOESN'T HAVE A SPECIFIC REASON TO SWITCH BUT HE RATHER CONTINUE CARE WITH YOU    IS THIS OKAY?

## 2020-07-22 NOTE — TELEPHONE ENCOUNTER
KRISTAN      Pt CALLED BACK AND HAS ALREADY SCHEDULED AN APPOINTMENT WITH SOMEONE ELSE UP HERE TO SEE ANDREA     Pt HAD GOTTEN HIM SELF CONFUSED AND IS GOING TO STICK WITH ANDREA SO THERE IS NO ISSUES

## 2020-07-22 NOTE — TELEPHONE ENCOUNTER
Unsure about the rules, he was seen by Dr. Graham for knee pain so the salud hip pain will be a new problem that no one has ever treated.

## 2020-08-10 ENCOUNTER — OFFICE VISIT (OUTPATIENT)
Dept: FAMILY MEDICINE CLINIC | Facility: CLINIC | Age: 80
End: 2020-08-10

## 2020-08-10 VITALS
TEMPERATURE: 97.8 F | SYSTOLIC BLOOD PRESSURE: 130 MMHG | DIASTOLIC BLOOD PRESSURE: 70 MMHG | BODY MASS INDEX: 23.74 KG/M2 | WEIGHT: 165.8 LBS | HEART RATE: 65 BPM | HEIGHT: 70 IN

## 2020-08-10 DIAGNOSIS — L82.0 SEBORRHEIC KERATOSES, INFLAMED: Primary | ICD-10-CM

## 2020-08-10 PROCEDURE — 99213 OFFICE O/P EST LOW 20 MIN: CPT | Performed by: NURSE PRACTITIONER

## 2020-08-10 NOTE — PROGRESS NOTES
"Subjective   Steve Bethea is a 79 y.o. male. Patient here today with complaints of Verrucous Vulgaris  pt here today with complaints of skin lesion on scalp which he thinks has been present x 2-3 months, denies bleeding or itching to area.     Vitals:    08/10/20 1354   BP: 130/70   Pulse: 65   Temp: 97.8 °F (36.6 °C)   Weight: 75.2 kg (165 lb 12.8 oz)   Height: 177.8 cm (70\")   PainSc: 0-No pain     Body mass index is 23.79 kg/m².  Past Medical History:   Diagnosis Date   • Coronary arteriosclerosis    • Degenerative joint disease involving multiple joints    • Hypertension    • Plantar fasciitis    • Sleep apnea    • Upper respiratory infection      History of Present Illness     The following portions of the patient's history were reviewed and updated as appropriate: allergies, current medications, past family history, past medical history, past social history, past surgical history and problem list.    Review of Systems   Constitutional: Negative.    HENT: Negative.    Eyes: Negative.    Respiratory: Negative.    Cardiovascular: Negative.    Gastrointestinal: Negative.    Endocrine: Negative.    Genitourinary: Negative.    Musculoskeletal: Negative.    Skin: Positive for wound.   Allergic/Immunologic: Negative.    Neurological: Negative.    Hematological: Negative.    Psychiatric/Behavioral: Negative.        Objective   Physical Exam   Constitutional: He is oriented to person, place, and time. He appears well-developed and well-nourished. No distress.   HENT:   Head: Normocephalic and atraumatic.   Cardiovascular: Normal rate, regular rhythm and normal heart sounds. Exam reveals no gallop and no friction rub.   No murmur heard.  Pulmonary/Chest: Effort normal and breath sounds normal. No stridor. No respiratory distress. He has no wheezes. He has no rales.   Neurological: He is alert and oriented to person, place, and time.   Skin: Skin is warm and dry. Lesion (irritated skin lesion, appears as seborrheic " lesion, not itching or bleeding but he states has only been present x 2-3 months  ) noted. He is not diaphoretic.        Nursing note and vitals reviewed.      Assessment/Plan   Steve was seen today for verrucous vulgaris.    Diagnoses and all orders for this visit:    Seborrheic keratoses, inflamed  -     Ambulatory Referral to Dermatology    referred to derm as above, will RTC as scheduled for chronic conditions or sooner if symptoms persist/worsen  He is aware and is in agreement to this plan  All questions and concerns are addressed with understanding noted

## 2020-08-24 DIAGNOSIS — M23.91 INTERNAL DERANGEMENT OF RIGHT KNEE: Primary | ICD-10-CM

## 2020-08-26 ENCOUNTER — OFFICE VISIT (OUTPATIENT)
Dept: ORTHOPEDIC SURGERY | Facility: CLINIC | Age: 80
End: 2020-08-26

## 2020-08-26 VITALS — HEIGHT: 70 IN | WEIGHT: 166 LBS | BODY MASS INDEX: 23.77 KG/M2

## 2020-08-26 DIAGNOSIS — G89.29 CHRONIC PAIN OF RIGHT KNEE: ICD-10-CM

## 2020-08-26 DIAGNOSIS — M17.11 PRIMARY OSTEOARTHRITIS OF RIGHT KNEE: Primary | ICD-10-CM

## 2020-08-26 DIAGNOSIS — M23.321 DEGENERATIVE TEAR OF POSTERIOR HORN OF MEDIAL MENISCUS, RIGHT: ICD-10-CM

## 2020-08-26 DIAGNOSIS — M25.561 CHRONIC PAIN OF RIGHT KNEE: ICD-10-CM

## 2020-08-26 DIAGNOSIS — M23.91 INTERNAL DERANGEMENT OF RIGHT KNEE: ICD-10-CM

## 2020-08-26 PROCEDURE — 20610 DRAIN/INJ JOINT/BURSA W/O US: CPT | Performed by: ORTHOPAEDIC SURGERY

## 2020-08-26 PROCEDURE — 99213 OFFICE O/P EST LOW 20 MIN: CPT | Performed by: ORTHOPAEDIC SURGERY

## 2020-08-26 RX ORDER — LIDOCAINE HYDROCHLORIDE 10 MG/ML
2 INJECTION, SOLUTION INFILTRATION; PERINEURAL
Status: COMPLETED | OUTPATIENT
Start: 2020-08-26 | End: 2020-08-26

## 2020-08-26 RX ORDER — TRIAMCINOLONE ACETONIDE 40 MG/ML
40 INJECTION, SUSPENSION INTRA-ARTICULAR; INTRAMUSCULAR
Status: COMPLETED | OUTPATIENT
Start: 2020-08-26 | End: 2020-08-26

## 2020-08-26 RX ADMIN — TRIAMCINOLONE ACETONIDE 40 MG: 40 INJECTION, SUSPENSION INTRA-ARTICULAR; INTRAMUSCULAR at 07:19

## 2020-08-26 RX ADMIN — LIDOCAINE HYDROCHLORIDE 2 ML: 10 INJECTION, SOLUTION INFILTRATION; PERINEURAL at 07:19

## 2020-08-26 NOTE — PROGRESS NOTES
"Steve Bethea is a 79 y.o. male returns for     Chief Complaint   Patient presents with   • Right Knee - Follow-up, Pain       HISTORY OF PRESENT ILLNESS: f/u right knee pain. Patient requesting evaluation for steroid injection, last injection done on 12/11/2019. xrays done today.    He has pain with daily activity.  Mostly he has dull aches but he also has occasional sharp stabbing pains.  No specific injury.  No numbness or tingling.  No fevers or chills.  His pain is slowly and progressively getting worse.  a prior injection in December 2019 was helpful.       CONCURRENT MEDICAL HISTORY:    The following portions of the patient's history were reviewed and updated as appropriate: allergies, current medications, past family history, past medical history, past social history, past surgical history and problem list.     ROS  No fevers or chills.  No chest pain or shortness of air.  No GI or  disturbances.    PHYSICAL EXAMINATION:       Ht 177.8 cm (70\")   Wt 75.3 kg (166 lb)   BMI 23.82 kg/m²     Physical Exam   Constitutional: He is oriented to person, place, and time. He appears well-developed and well-nourished.   Neurological: He is alert and oriented to person, place, and time.   Psychiatric: He has a normal mood and affect. His behavior is normal. Judgment and thought content normal.       GAIT:     []  Normal  [x]  Antalgic    Assistive device: [x]  None  []  Walker     []  Crutches  []  Cane     []  Wheelchair  []  Stretcher    Right Knee Exam     Muscle Strength   The patient has normal right knee strength.    Tenderness   Right knee tenderness location: diffuse.    Range of Motion   Extension: -5   Flexion: 120     Tests   Varus: negative Valgus: negative  Drawer:  Anterior - negative    Posterior - negative    Other   Sensation: normal  Pulse: present  Swelling: mild    Comments:  Crepitation on motion.  Mild to moderate pain through arc of motion              Xr Knee 1 Or 2 View Right    Result " Date: 8/26/2020  Narrative: Ordering Provider:  Catalino Graham MD Ordering Diagnosis/Indication:  Internal derangement of right knee Procedure:  XR KNEE 1 OR 2 VW RIGHT Exam Date:  8/26/20 COMPARISON:  Todays X-rays were compared to previous images dated February 12, 2019.     Impression:  AP bilateral standing of the knees with lateral of the right knee shows acceptable position and alignment of the left knee with no acute bony abnormality.  No fracture or dislocation is noted and no significant arthritic change noted involving the left knee.  The right knee shows medial joint space narrowing with mild flattening of the medial femoral condyle.  Mild varus positioning is noted.  Mild lateral subluxation of the tibia is noted as well.  Mild to moderate arthritic change noted in the patellofemoral compartment.  The findings in the right knee are slightly progressive in comparison to prior x-rays. Catalino Graham MD 8/26/20     Xr Knee Bilateral Ap Standing    Result Date: 8/26/2020  Narrative: Ordering Provider:  Catalino Graham MD Ordering Diagnosis/Indication:  Internal derangement of right knee Procedure:  XR KNEE BILATERAL AP STANDING Exam Date:  8/26/20 COMPARISON:  Todays X-rays were compared to previous images dated February 12, 2019.     Impression:  AP bilateral standing of the knees with lateral of the right knee shows acceptable position and alignment of the left knee with no acute bony abnormality.  No fracture or dislocation is noted and no significant arthritic change noted involving the left knee.  The right knee shows medial joint space narrowing with mild flattening of the medial femoral condyle.  Mild varus positioning is noted.  Mild lateral subluxation of the tibia is noted as well.  Mild to moderate arthritic change noted in the patellofemoral compartment.  The findings in the right knee are slightly progressive in comparison to prior x-rays. 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, right  -     Large Joint Arthrocentesis: R knee    Chronic pain of right knee  -     Large Joint Arthrocentesis: R knee    Large Joint Arthrocentesis: R knee  Date/Time: 8/26/2020 7:19 AM  Consent given by: patient  Site marked: site marked  Timeout: Immediately prior to procedure a time out was called to verify the correct patient, procedure, equipment, support staff and site/side marked as required   Supporting Documentation  Indications: pain   Procedure Details  Location: knee - R knee  Preparation: Patient was prepped and draped in the usual sterile fashion  Needle size: 22 G  Approach: anteromedial  Medications administered: 2 mL lidocaine 1 %; 40 mg triamcinolone acetonide 40 MG/ML  Patient tolerance: patient tolerated the procedure well with no immediate complications              PLAN    We discussed slowly progressing strength and conditioning as tolerated.  Activity as tolerated.  He has arthritic change in his right knee and we discussed steroid injection into the right knee today.  We also discussed the possibility of a Visco supplementation injection in the future.  Follow-up as needed.    Patient's Body mass index is 23.82 kg/m². BMI is within normal parameters. No follow-up required..      Return if symptoms worsen or fail to improve, for recheck.    Catalino Graham MD

## 2020-11-24 ENCOUNTER — LAB (OUTPATIENT)
Dept: LAB | Facility: OTHER | Age: 80
End: 2020-11-24

## 2020-11-24 PROCEDURE — U0003 INFECTIOUS AGENT DETECTION BY NUCLEIC ACID (DNA OR RNA); SEVERE ACUTE RESPIRATORY SYNDROME CORONAVIRUS 2 (SARS-COV-2) (CORONAVIRUS DISEASE [COVID-19]), AMPLIFIED PROBE TECHNIQUE, MAKING USE OF HIGH THROUGHPUT TECHNOLOGIES AS DESCRIBED BY CMS-2020-01-R: HCPCS | Performed by: PHYSICIAN ASSISTANT

## 2020-12-05 ENCOUNTER — HOSPITAL ENCOUNTER (EMERGENCY)
Facility: HOSPITAL | Age: 80
Discharge: HOME | End: 2020-12-05
Attending: STUDENT IN AN ORGANIZED HEALTH CARE EDUCATION/TRAINING PROGRAM | Admitting: STUDENT IN AN ORGANIZED HEALTH CARE EDUCATION/TRAINING PROGRAM

## 2020-12-05 VITALS
OXYGEN SATURATION: 97 % | HEART RATE: 62 BPM | RESPIRATION RATE: 18 BRPM | SYSTOLIC BLOOD PRESSURE: 163 MMHG | WEIGHT: 161 LBS | HEIGHT: 70 IN | TEMPERATURE: 97.2 F | DIASTOLIC BLOOD PRESSURE: 89 MMHG | BODY MASS INDEX: 23.05 KG/M2

## 2020-12-05 DIAGNOSIS — G89.29 CHRONIC PAIN OF LEFT KNEE: Primary | ICD-10-CM

## 2020-12-05 DIAGNOSIS — M25.562 CHRONIC PAIN OF LEFT KNEE: Primary | ICD-10-CM

## 2020-12-05 DIAGNOSIS — M25.559 HIP PAIN: ICD-10-CM

## 2020-12-05 PROCEDURE — 99282 EMERGENCY DEPT VISIT SF MDM: CPT

## 2020-12-05 NOTE — ED PROVIDER NOTES
"Subjective   80-year-old male history of arthritis of his right knee\" leg\" comes to the ER chief complaint of left hip and knee pain that is getting worse over the last month.  Patient sees orthopedic for joint injections to manage the pain of his right knee.  He has been using his left leg more due to his right leg pain.  He thinks he overdid it.  Patient takes Tylenol at home, which helps a little bit with the pain.  No injury, fall to the left hip or knee.  Patient is also complaining of \"cold legs\" that started a few weeks ago when the weather \"turned chilly\".  No weakness, numbness, difficulty walking.      History provided by:  Patient   used: No        Review of Systems   Constitutional: Negative for chills, diaphoresis, fatigue and fever.   HENT: Negative for congestion and rhinorrhea.    Respiratory: Negative for cough, shortness of breath and wheezing.    Cardiovascular: Negative for chest pain, palpitations and leg swelling.   Gastrointestinal: Negative for abdominal pain, diarrhea and nausea.   Musculoskeletal:        Left hip and knee pain  Cold legs   Skin: Negative for color change and rash.   Neurological: Negative for weakness, light-headedness and numbness.       Past Medical History:   Diagnosis Date   • Coronary arteriosclerosis    • Degenerative joint disease involving multiple joints    • Hypertension    • Plantar fasciitis    • Sleep apnea    • Upper respiratory infection        No Known Allergies    Past Surgical History:   Procedure Laterality Date   • ANGIOPLASTY  12/17/2012    x 1 stent   • COLONOSCOPY     • ENDOSCOPY N/A 5/11/2018    Procedure: ESOPHAGOGASTRODUODENOSCOPY possible dilation;  Surgeon: Praneeth Ignacio MD;  Location: [facility] ENDOSCOPY;  Service: Gastroenterology   • EYE SURGERY     • INJECTION OF MEDICATION  04/03/2013    Inj(s) Tend-Sheath, Ligament, Single 95755 (1)         Family History   Problem Relation Age of Onset   • Alzheimer's disease Mother    • " "Hypertension Mother    • Colon cancer Father    • Hypertension Father    • Stroke Other         uncle   • Heart disease Other    • Cancer Brother        Social History     Socioeconomic History   • Marital status: Single     Spouse name: Not on file   • Number of children: Not on file   • Years of education: Not on file   • Highest education level: Not on file   Tobacco Use   • Smoking status: Former Smoker   • Smokeless tobacco: Never Used   Substance and Sexual Activity   • Alcohol use: No   • Drug use: No   • Sexual activity: Defer           Objective    Vitals:    12/05/20 0850 12/05/20 0903   BP: 163/89    BP Location: Right arm    Patient Position: Sitting    Pulse: 62    Resp: 18    Temp: 97.2 °F (36.2 °C)    TempSrc: Tympanic    SpO2: 97%    Weight:  73 kg (161 lb)   Height:  177.8 cm (70\")       Physical Exam  Vitals signs and nursing note reviewed.   Constitutional:       General: He is not in acute distress.     Appearance: He is well-developed. He is not diaphoretic.   HENT:      Head: Normocephalic.      Right Ear: External ear normal.      Left Ear: External ear normal.      Nose: No rhinorrhea.   Eyes:      Conjunctiva/sclera: Conjunctivae normal.   Neck:      Musculoskeletal: Normal range of motion.      Trachea: Trachea normal.   Cardiovascular:      Pulses: Normal pulses.   Pulmonary:      Effort: Pulmonary effort is normal. No accessory muscle usage or respiratory distress.   Musculoskeletal: Normal range of motion.         General: No swelling, tenderness, deformity or signs of injury.   Skin:     General: Skin is warm and dry.      Capillary Refill: Capillary refill takes less than 2 seconds.   Neurological:      General: No focal deficit present.      Mental Status: He is alert and oriented to person, place, and time. Mental status is at baseline.      Sensory: No sensory deficit.      Motor: No weakness.   Psychiatric:         Behavior: Behavior normal.         Procedures           ED Course   "                                         MDM  Number of Diagnoses or Management Options  Chronic pain of left knee: new and does not require workup  Hip pain: new and does not require workup  Diagnosis management comments: Vital signs are stable, afebrile.  Neurovascularly intact with good peripheral pulses.  Physical exam is unremarkable.  No recent injury or acute changes so imaging not warranted at this time.  Recommended follow-up with his orthopedic surgeon, who has an appointment with in a few days.  Offered pain medicine, but patient declined stating he would rather just take Tylenol at home.  Also recommended he follow-up with his PCP.       Amount and/or Complexity of Data Reviewed  Decide to obtain previous medical records or to obtain history from someone other than the patient: yes  Review and summarize past medical records: yes    Patient Progress  Patient progress: improved      Final diagnoses:   Chronic pain of left knee   Hip pain            Davin Lombardo MD  12/05/20 0901

## 2020-12-08 DIAGNOSIS — M25.552 LEFT HIP PAIN: Primary | ICD-10-CM

## 2020-12-09 ENCOUNTER — OFFICE VISIT (OUTPATIENT)
Dept: ORTHOPEDIC SURGERY | Facility: CLINIC | Age: 80
End: 2020-12-09

## 2020-12-09 VITALS — HEIGHT: 70 IN | BODY MASS INDEX: 23.05 KG/M2 | WEIGHT: 161 LBS

## 2020-12-09 DIAGNOSIS — I10 ESSENTIAL HYPERTENSION: ICD-10-CM

## 2020-12-09 DIAGNOSIS — K21.9 GASTROESOPHAGEAL REFLUX DISEASE WITHOUT ESOPHAGITIS: ICD-10-CM

## 2020-12-09 DIAGNOSIS — M25.552 LEFT HIP PAIN: ICD-10-CM

## 2020-12-09 DIAGNOSIS — I25.10 CORONARY ARTERIOSCLEROSIS: ICD-10-CM

## 2020-12-09 DIAGNOSIS — M70.62 TROCHANTERIC BURSITIS OF LEFT HIP: Primary | ICD-10-CM

## 2020-12-09 PROCEDURE — 99214 OFFICE O/P EST MOD 30 MIN: CPT | Performed by: ORTHOPAEDIC SURGERY

## 2020-12-09 RX ORDER — MELOXICAM 15 MG/1
15 TABLET ORAL DAILY
Qty: 30 TABLET | Refills: 2 | Status: SHIPPED | OUTPATIENT
Start: 2020-12-09 | End: 2022-04-13

## 2020-12-09 NOTE — PROGRESS NOTES
Steve Bethea is a 80 y.o. male   Primary provider:  Maria Teresa Torres APRN       Chief Complaint   Patient presents with   • Left Hip - Pain   • Initial Evaluation     Xray today       HISTORY OF PRESENT ILLNESS:  Patient states that he has been having occasional pain in his left hip.  He reports no specific injury.  He states that he has random pains.  No numbness or tingling.  No fevers or chills.  Pain is on the outside part of his left hip and does not include his groin.  It is more of a sharp aching pain when it is present.  He has some occasional dull aching pain.    Pain  This is a new problem. The current episode started in the past 7 days. The problem occurs intermittently. Associated symptoms include headaches, joint swelling and numbness. Associated symptoms comments: aching. The symptoms are aggravated by twisting, walking and standing. He has tried acetaminophen and NSAIDs for the symptoms. The treatment provided mild relief.        CONCURRENT MEDICAL HISTORY:    Past Medical History:   Diagnosis Date   • Coronary arteriosclerosis    • Degenerative joint disease involving multiple joints    • Hypertension    • Plantar fasciitis    • Sleep apnea    • Upper respiratory infection        No Known Allergies      Current Outpatient Medications:   •  aspirin 81 MG EC tablet, Take 81 mg by mouth Daily., Disp: , Rfl:   •  atorvastatin (LIPITOR) 80 MG tablet, Take 80 mg by mouth., Disp: , Rfl:   •  benzonatate (TESSALON) 100 MG capsule, Take 1 capsule by mouth 3 (Three) Times a Day As Needed for Cough for up to 30 doses., Disp: 30 capsule, Rfl: 0  •  Cholecalciferol 2000 UNITS tablet, Take 2,000 Units by mouth., Disp: , Rfl:   •  clopidogrel (PLAVIX) 75 MG tablet, Take 75 mg by mouth., Disp: , Rfl:   •  enalapril (VASOTEC) 20 MG tablet, Take 20 mg by mouth., Disp: , Rfl:   •  Flaxseed, Linseed, (FLAXSEED OIL PO), Take  by mouth., Disp: , Rfl:   •  hydroCHLOROthiazide (MICROZIDE) 12.5 MG capsule, Take 12.5  mg by mouth Daily., Disp: , Rfl:   •  Magnesium 250 MG tablet, Take  by mouth., Disp: , Rfl:   •  nitroglycerin (NITROSTAT) 0.4 MG SL tablet, Place 0.4 mg under the tongue Every 5 (Five) Minutes., Disp: , Rfl:   •  Omega-3 Fatty Acids (FISH OIL PO), Take 1 capsule by mouth., Disp: , Rfl:   •  meloxicam (MOBIC) 15 MG tablet, Take 1 tablet by mouth Daily., Disp: 30 tablet, Rfl: 2    Past Surgical History:   Procedure Laterality Date   • ANGIOPLASTY  12/17/2012    x 1 stent   • COLONOSCOPY     • ENDOSCOPY N/A 5/11/2018    Procedure: ESOPHAGOGASTRODUODENOSCOPY possible dilation;  Surgeon: Praneeth Ignacio MD;  Location: [facility] ENDOSCOPY;  Service: Gastroenterology   • EYE SURGERY     • INJECTION OF MEDICATION  04/03/2013    Inj(s) Tend-Sheath, Ligament, Single 93346 (1)         Family History   Problem Relation Age of Onset   • Alzheimer's disease Mother    • Hypertension Mother    • Colon cancer Father    • Hypertension Father    • Stroke Other         uncle   • Heart disease Other    • Cancer Brother        Social History     Socioeconomic History   • Marital status: Single     Spouse name: Not on file   • Number of children: Not on file   • Years of education: Not on file   • Highest education level: Not on file   Tobacco Use   • Smoking status: Former Smoker   • Smokeless tobacco: Never Used   Substance and Sexual Activity   • Alcohol use: No   • Drug use: No   • Sexual activity: Defer        Review of Systems   Constitutional: Positive for unexpected weight change.   HENT: Positive for hearing loss, postnasal drip and tinnitus.    Eyes: Negative.    Respiratory: Negative.    Cardiovascular: Negative.    Gastrointestinal: Negative.    Endocrine: Negative.    Genitourinary: Negative.    Musculoskeletal: Positive for joint swelling.        Muscle pain   Skin: Negative.    Allergic/Immunologic: Negative.    Neurological: Positive for numbness and headaches.        Tingling   Hematological: Negative.   "  Psychiatric/Behavioral: Positive for sleep disturbance.       PHYSICAL EXAMINATION:       Ht 177.8 cm (70\")   Wt 73 kg (161 lb)   BMI 23.10 kg/m²     Physical Exam  Constitutional:       General: He is not in acute distress.     Appearance: Normal appearance. He is well-developed. He is not ill-appearing.   Pulmonary:      Effort: Pulmonary effort is normal. No respiratory distress.   Neurological:      Mental Status: He is alert and oriented to person, place, and time.   Psychiatric:         Mood and Affect: Mood normal.         Behavior: Behavior normal.         Thought Content: Thought content normal.         Judgment: Judgment normal.         GAIT:     [x]  Normal  []  Antalgic    Assistive device: [x]  None  []  Walker     []  Crutches  []  Cane     []  Wheelchair  []  Stretcher    Right Hip Exam     Tenderness   The patient is experiencing no tenderness.     Range of Motion   The patient has normal right hip ROM.    Muscle Strength   The patient has normal right hip strength.    Tests   CATHY: negative  Fadir:  Negative FADIR test    Other   Erythema: absent  Sensation: normal  Pulse: present      Left Hip Exam     Tenderness   Left hip tenderness location: Mild tenderness over the greater trochanter.    Range of Motion   The patient has normal left hip ROM.    Muscle Strength   The patient has normal left hip strength.     Tests   CATHY: negative  Fadir:  Negative FADIR test    Other   Erythema: absent  Sensation: normal  Pulse: present                  Xr Hip With Or Without Pelvis 2 - 3 View Left    Result Date: 12/9/2020  Narrative: Ordering Provider:  Catalino Graham MD Ordering Diagnosis/Indication:  Left hip pain Procedure:  XR HIP W OR WO PELVIS 2-3 VIEW LEFT Exam Date:  12/9/20 COMPARISON:  Not applicable, no relevant images available.     Impression:  AP standing of the pelvis with AP and lateral of the left hip shows acceptable position alignment of both hips with no evidence of acute " bony abnormality.  No fracture or dislocation is noted.  No significant arthritic change noted in either hip. Catalino Graham MD 12/9/20           ASSESSMENT:    Diagnoses and all orders for this visit:    Trochanteric bursitis of left hip  -     Ambulatory Referral to Physical Therapy Evaluate and treat; ROM (teach HEP, adavance as tolerated. ), Strengthening    Left hip pain  -     Ambulatory Referral to Physical Therapy Evaluate and treat; ROM (teach HEP, adavance as tolerated. ), Strengthening    Coronary arteriosclerosis    Essential hypertension    Gastroesophageal reflux disease without esophagitis    Other orders  -     meloxicam (MOBIC) 15 MG tablet; Take 1 tablet by mouth Daily.          PLAN    We discussed a trial of physical therapy for range of motion and strengthening, stretching exercises, home exercise program, and modalities as needed for trochanteric bursitis.    He will also begin meloxicam but we discussed the possibility of bleeding risk and signs and symptoms to watch for.  We will see if that helps with his general soreness and decrease the frequency of his symptoms.    We discussed the possibility of steroid injection if his symptoms are not getting better or if they worsen.    Follow-up in 6 weeks.    Patient's Body mass index is 23.1 kg/m². BMI is within normal parameters. No follow-up required..      Return in about 6 weeks (around 1/20/2021) for recheck.    Catalino Graham MD

## 2020-12-21 ENCOUNTER — OFFICE VISIT (OUTPATIENT)
Dept: FAMILY MEDICINE CLINIC | Facility: CLINIC | Age: 80
End: 2020-12-21

## 2020-12-21 VITALS
OXYGEN SATURATION: 97 % | HEART RATE: 74 BPM | RESPIRATION RATE: 16 BRPM | BODY MASS INDEX: 22.88 KG/M2 | WEIGHT: 159.8 LBS | SYSTOLIC BLOOD PRESSURE: 128 MMHG | DIASTOLIC BLOOD PRESSURE: 76 MMHG | HEIGHT: 70 IN

## 2020-12-21 DIAGNOSIS — H91.91 DECREASED HEARING OF RIGHT EAR: ICD-10-CM

## 2020-12-21 DIAGNOSIS — G47.00 INSOMNIA, UNSPECIFIED TYPE: ICD-10-CM

## 2020-12-21 DIAGNOSIS — R11.0 NAUSEA: ICD-10-CM

## 2020-12-21 DIAGNOSIS — R51.9 NONINTRACTABLE HEADACHE, UNSPECIFIED CHRONICITY PATTERN, UNSPECIFIED HEADACHE TYPE: Primary | ICD-10-CM

## 2020-12-21 DIAGNOSIS — F41.9 ANXIETY DISORDER, UNSPECIFIED TYPE: ICD-10-CM

## 2020-12-21 DIAGNOSIS — R42 DIZZINESS: ICD-10-CM

## 2020-12-21 PROBLEM — S61.019A LACERATION OF THUMB: Status: ACTIVE | Noted: 2020-12-21

## 2020-12-21 PROBLEM — S70.00XA CONTUSION, HIP AND THIGH: Status: ACTIVE | Noted: 2020-12-21

## 2020-12-21 PROBLEM — R11.2 NAUSEA & VOMITING: Status: ACTIVE | Noted: 2020-12-21
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
## 2020-12-21 PROBLEM — S70.10XA CONTUSION, HIP AND THIGH: Status: ACTIVE | Noted: 2020-12-21
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
## 2020-12-21 PROBLEM — Z95.5 H/O HEART ARTERY STENT: Status: ACTIVE | Noted: 2020-12-21

## 2020-12-21 PROBLEM — K59.00 CONSTIPATION: Status: ACTIVE | Noted: 2020-12-21

## 2020-12-21 PROBLEM — R00.1 SLOW HEART RATE: Status: ACTIVE | Noted: 2020-12-21

## 2020-12-21 PROCEDURE — 99214 OFFICE O/P EST MOD 30 MIN: CPT | Performed by: NURSE PRACTITIONER

## 2020-12-21 RX ORDER — INFLUENZA A VIRUS A/MICHIGAN/45/2015 X-275 (H1N1) ANTIGEN (FORMALDEHYDE INACTIVATED), INFLUENZA A VIRUS A/SINGAPORE/INFIMH-16-0019/2016 IVR-186 (H3N2) ANTIGEN (FORMALDEHYDE INACTIVATED), AND INFLUENZA B VIRUS B/MARYLAND/15/2016 BX-69A (A B/COLORADO/6/2017-LIKE VIRUS) ANTIGEN (FORMALDEHYDE INACTIVATED) 60; 60; 60 UG/.5ML; UG/.5ML; UG/.5ML
INJECTION, SUSPENSION INTRAMUSCULAR
COMMUNITY
End: 2021-01-21

## 2020-12-21 RX ORDER — TRAZODONE HYDROCHLORIDE 50 MG/1
50 TABLET ORAL NIGHTLY
Qty: 30 TABLET | Refills: 1 | Status: SHIPPED | OUTPATIENT
Start: 2020-12-21 | End: 2022-04-18 | Stop reason: ALTCHOICE

## 2020-12-21 NOTE — PROGRESS NOTES
"Subjective   Steve Bethea is a 80 y.o. male. Patient here today with complaints of Headache (dull headache for 2 weeks)  Patient here today with complaints of dull headache to the temples bilaterally but right is worse than the left, present x2 weeks.  He states that he did go to the emergency room approximately 2 weeks ago had CT of his head and these results are reviewed in epic, no acute findings are noted, he says that the headache will come and go he does have some dizziness and slight nausea with decreased appetite and weight loss.  Denies any vision changes, has had chronic decreased hearing right ear.  Was prescribed antibiotics by urgent care prior to ER visit for his \"sinuses \".  He complains of difficulty sleeping due to knee and hip pain.  Says he continues to see Dr. Graham for orthopedic pain.    Vitals:    12/21/20 0909   BP: 128/76   BP Location: Left arm   Patient Position: Sitting   Cuff Size: Adult   Pulse: 74   Resp: 16   SpO2: 97%   Weight: 72.5 kg (159 lb 12.8 oz)   Height: 177.8 cm (70\")     Body mass index is 22.93 kg/m².  Past Medical History:   Diagnosis Date   • Coronary arteriosclerosis    • Degenerative joint disease involving multiple joints    • Hypertension    • Plantar fasciitis    • Sleep apnea    • Upper respiratory infection      Headache   This is a new problem. The current episode started 1 to 4 weeks ago. The problem occurs intermittently. The problem has been waxing and waning. The pain is located in the bilateral region. The pain does not radiate. The pain is moderate. Associated symptoms include dizziness, hearing loss (chronic finding for him, R ear), insomnia, nausea and a visual change. Pertinent negatives include no phonophobia, photophobia, rhinorrhea, scalp tenderness or sinus pressure. Nothing aggravates the symptoms. He has tried acetaminophen for the symptoms. The treatment provided mild relief. His past medical history is significant for hypertension and sinus " disease.   Insomnia  This is a recurrent problem. The current episode started more than 1 month ago. The problem occurs intermittently. The problem has been waxing and waning. Associated symptoms include headaches, nausea and a visual change. He has tried sleep, rest and relaxation for the symptoms. The treatment provided no relief.        The following portions of the patient's history were reviewed and updated as appropriate: allergies, current medications, past family history, past medical history, past social history, past surgical history and problem list.    Review of Systems   Constitutional: Negative.    HENT: Positive for hearing loss (chronic finding for him, R ear). Negative for rhinorrhea and sinus pressure.    Eyes: Negative.  Negative for photophobia.   Respiratory: Negative.    Cardiovascular: Negative.    Gastrointestinal: Positive for nausea.   Endocrine: Negative.    Genitourinary: Negative.    Musculoskeletal: Negative.    Skin: Negative.    Allergic/Immunologic: Negative.    Neurological: Positive for dizziness and headaches.   Hematological: Negative.    Psychiatric/Behavioral: The patient has insomnia.        Objective   Physical Exam  Vitals signs and nursing note reviewed.   Constitutional:       General: He is not in acute distress.     Appearance: Normal appearance. He is not ill-appearing, toxic-appearing or diaphoretic.   HENT:      Head: Normocephalic and atraumatic.   Eyes:      Pupils: Pupils are equal, round, and reactive to light.   Neck:      Vascular: No carotid bruit.   Cardiovascular:      Rate and Rhythm: Normal rate and regular rhythm.      Pulses: Normal pulses.      Heart sounds: Normal heart sounds. No murmur. No friction rub. No gallop.    Pulmonary:      Effort: Pulmonary effort is normal. No respiratory distress.      Breath sounds: Normal breath sounds. No stridor. No wheezing, rhonchi or rales.   Skin:     General: Skin is warm and dry.      Coloration: Skin is not  jaundiced or pale.      Findings: No bruising, erythema, lesion or rash.   Neurological:      Mental Status: He is alert and oriented to person, place, and time.      Cranial Nerves: No cranial nerve deficit.      Motor: No weakness, tremor, atrophy or seizure activity.      Coordination: Coordination normal.      Gait: Gait normal.   Psychiatric:         Mood and Affect: Mood is anxious.         Speech: Speech is rapid and pressured.         Behavior: Behavior normal. Behavior is cooperative.         Thought Content: Thought content normal.         Cognition and Memory: Cognition normal.      Comments: Appears well kempt, discusses his problems          Assessment/Plan   Diagnoses and all orders for this visit:    1. Insomnia, unspecified type (Primary)    2. Nonintractable headache, unspecified chronicity pattern, unspecified headache type  -     MRI Brain With & Without Contrast; Future  -     CBC & Differential; Future  -     Comprehensive metabolic panel; Future  -     TSH; Future    3. Dizziness  -     MRI Brain With & Without Contrast; Future  -     CBC & Differential; Future  -     Comprehensive metabolic panel; Future  -     TSH; Future    4. Nausea  -     MRI Brain With & Without Contrast; Future  -     CBC & Differential; Future  -     Comprehensive metabolic panel; Future  -     TSH; Future    5. Decreased hearing of right ear  -     MRI Brain With & Without Contrast; Future  -     CBC & Differential; Future  -     Comprehensive metabolic panel; Future  -     TSH; Future    6. Anxiety disorder, unspecified type   -     TSH; Future    Other orders  -     traZODone (DESYREL) 50 MG tablet; Take 1 tablet by mouth Every Night.  Dispense: 30 tablet; Refill: 1    ER records are reviewed including CT results  I will obtain MRI and labs as above and have him follow-up in 1 month for recheck sooner if needed  Certainly will go to ER if symptoms worsen prior to follow-up here or prior to being able to obtain these  results  He can be informed of results prior to visit however via phone as soon as we get them back  Continue using Tylenol as needed pain  He is prescribed trazodone as above as needed sleep and is advised no Tylenol PM with this, I do feel like if we can get sleep improved his headache will improve as well  Patient aware and in agreement to above plan  All questions and concerns addressed with understanding verbalized.

## 2020-12-23 ENCOUNTER — HOSPITAL ENCOUNTER (OUTPATIENT)
Dept: MRI IMAGING | Facility: HOSPITAL | Age: 80
Discharge: HOME | End: 2020-12-23
Admitting: NURSE PRACTITIONER

## 2020-12-23 DIAGNOSIS — H91.91 DECREASED HEARING OF RIGHT EAR: ICD-10-CM

## 2020-12-23 DIAGNOSIS — R51.9 NONINTRACTABLE HEADACHE, UNSPECIFIED CHRONICITY PATTERN, UNSPECIFIED HEADACHE TYPE: ICD-10-CM

## 2020-12-23 DIAGNOSIS — R11.0 NAUSEA: ICD-10-CM

## 2020-12-23 DIAGNOSIS — R42 DIZZINESS: ICD-10-CM

## 2020-12-23 PROCEDURE — 70553 MRI BRAIN STEM W/O & W/DYE: CPT

## 2020-12-23 PROCEDURE — 25010000002 GADOTERIDOL PER 1 ML: Performed by: NURSE PRACTITIONER

## 2020-12-23 PROCEDURE — A9579 GAD-BASE MR CONTRAST NOS,1ML: HCPCS | Performed by: NURSE PRACTITIONER

## 2020-12-23 RX ADMIN — GADOTERIDOL 16 ML: 279.3 INJECTION, SOLUTION INTRAVENOUS at 10:42

## 2021-01-21 ENCOUNTER — OFFICE VISIT (OUTPATIENT)
Dept: FAMILY MEDICINE CLINIC | Facility: CLINIC | Age: 81
End: 2021-01-21

## 2021-01-21 VITALS
HEIGHT: 70 IN | WEIGHT: 163 LBS | BODY MASS INDEX: 23.34 KG/M2 | SYSTOLIC BLOOD PRESSURE: 146 MMHG | DIASTOLIC BLOOD PRESSURE: 70 MMHG | TEMPERATURE: 97.8 F | HEART RATE: 76 BPM

## 2021-01-21 DIAGNOSIS — Z95.5 H/O HEART ARTERY STENT: ICD-10-CM

## 2021-01-21 DIAGNOSIS — I35.8 AORTIC VALVE SCLEROSIS: ICD-10-CM

## 2021-01-21 DIAGNOSIS — I05.8 MITRAL VALVE SCLEROSIS: ICD-10-CM

## 2021-01-21 DIAGNOSIS — I10 ESSENTIAL HYPERTENSION: ICD-10-CM

## 2021-01-21 DIAGNOSIS — I25.10 CORONARY ARTERIOSCLEROSIS: ICD-10-CM

## 2021-01-21 DIAGNOSIS — Z12.11 ENCOUNTER FOR SCREENING FOR MALIGNANT NEOPLASM OF COLON: ICD-10-CM

## 2021-01-21 DIAGNOSIS — R51.9 NONINTRACTABLE HEADACHE, UNSPECIFIED CHRONICITY PATTERN, UNSPECIFIED HEADACHE TYPE: Primary | ICD-10-CM

## 2021-01-21 PROCEDURE — 99213 OFFICE O/P EST LOW 20 MIN: CPT | Performed by: NURSE PRACTITIONER

## 2021-01-21 NOTE — PROGRESS NOTES
"Subjective   Steve Bethea is a 80 y.o. male. Patient here today with complaints of Follow-up and Headache  Patient here today for follow-up on headaches stating that he feels like he is some better, still having them occasionally when he bends over or leans over to look under his bed but overall he says he feels \"pretty good \".  Feels like his energy level is \"good \".  He had previously been seen here and in ER and urgent care with headache, had MRI with nothing acute noted on MRI and he says at this point he wants to wait on neurology referral.  He has been seeing Dr. Monahan, cardiology, requesting referral to cardiologist locally.  He has history of mitral valve sclerosis CAD, hypertension, aortic valve sclerosis and cardiac stent.  He does not have any complaints of chest pain shortness of breath currently.    Vitals:    01/21/21 1049   BP: 146/70   Pulse: 76   Temp: 97.8 °F (36.6 °C)   Weight: 73.9 kg (163 lb)   Height: 177.8 cm (70\")   PainSc: 0-No pain     Body mass index is 23.39 kg/m².  Past Medical History:   Diagnosis Date   • Coronary arteriosclerosis    • Degenerative joint disease involving multiple joints    • Hypertension    • Plantar fasciitis    • Sleep apnea    • Upper respiratory infection      Headache   This is a chronic problem. The current episode started more than 1 year ago. The problem occurs intermittently. The problem has been waxing and waning. The pain is located in the right unilateral and temporal region. The pain does not radiate. The treatment provided mild relief.   Hypertension  This is a chronic problem. The current episode started more than 1 year ago. The problem is unchanged. The problem is controlled. Associated symptoms include headaches. Pertinent negatives include no chest pain or shortness of breath. Agents associated with hypertension include NSAIDs. Risk factors for coronary artery disease include male gender. Past treatments include lifestyle changes, ACE " inhibitors and diuretics. Current antihypertension treatment includes lifestyle changes, diuretics and ACE inhibitors. The current treatment provides mild improvement. Compliance problems include exercise, diet and psychosocial issues.  Hypertensive end-organ damage includes CAD/MI. Identifiable causes of hypertension include a hypertension causing med.        The following portions of the patient's history were reviewed and updated as appropriate: allergies, current medications, past family history, past medical history, past social history, past surgical history and problem list.    Review of Systems   Constitutional: Negative.    Eyes: Negative.    Respiratory: Negative.  Negative for shortness of breath.    Cardiovascular: Negative.  Negative for chest pain.   Gastrointestinal: Negative.    Endocrine: Negative.    Genitourinary: Negative.    Musculoskeletal: Negative.    Skin: Negative.    Allergic/Immunologic: Negative.    Neurological: Positive for headaches.   Hematological: Negative.    Psychiatric/Behavioral: Negative.        Objective   Physical Exam  Vitals signs and nursing note reviewed.   Constitutional:       General: He is not in acute distress.     Appearance: Normal appearance. He is not ill-appearing, toxic-appearing or diaphoretic.   HENT:      Head: Normocephalic and atraumatic.   Neck:      Vascular: No carotid bruit.   Cardiovascular:      Rate and Rhythm: Normal rate and regular rhythm.      Heart sounds: Normal heart sounds. No murmur. No friction rub. No gallop.    Pulmonary:      Effort: Pulmonary effort is normal. No respiratory distress.      Breath sounds: Normal breath sounds. No stridor. No wheezing, rhonchi or rales.   Skin:     General: Skin is warm and dry.      Coloration: Skin is not jaundiced or pale.      Findings: No bruising, erythema, lesion or rash.   Neurological:      Mental Status: He is alert and oriented to person, place, and time.   Psychiatric:         Mood and  Affect: Mood normal.         Speech: Speech is rapid and pressured.         Behavior: Behavior normal.         Thought Content: Thought content normal.         Judgment: Judgment normal.         Assessment/Plan   Diagnoses and all orders for this visit:    1. Nonintractable headache, unspecified chronicity pattern, unspecified headache type (Primary)    2. Encounter for screening for malignant neoplasm of colon    3. Mitral valve sclerosis  -     Ambulatory Referral to Cardiology    4. Coronary arteriosclerosis  -     Ambulatory Referral to Cardiology    5. Essential hypertension  -     Ambulatory Referral to Cardiology    6. Aortic valve sclerosis  -     Ambulatory Referral to Cardiology    7. H/O heart artery stent  -     Ambulatory Referral to Cardiology    Refer to new cardiologist Dr. Schneider per patient request, would like to see him in [place]  Offered but declined neurology referral due to continued intermittent headaches  Will let me know he says if he desires to be referred prior to next office visit  Offered but declined colonoscopy stating that he would like to wait a few more months before having this  He will follow up here as needed problems or as scheduled for chronic conditions otherwise.  Patient aware and in agreement to this plan  All questions and concerns addressed with understanding verbalized.

## 2021-01-27 ENCOUNTER — OFFICE VISIT (OUTPATIENT)
Dept: ORTHOPEDIC SURGERY | Facility: CLINIC | Age: 81
End: 2021-01-27

## 2021-01-27 VITALS — HEIGHT: 70 IN | BODY MASS INDEX: 22.62 KG/M2 | WEIGHT: 158 LBS

## 2021-01-27 DIAGNOSIS — I10 ESSENTIAL HYPERTENSION: ICD-10-CM

## 2021-01-27 DIAGNOSIS — K21.9 GASTROESOPHAGEAL REFLUX DISEASE WITHOUT ESOPHAGITIS: ICD-10-CM

## 2021-01-27 DIAGNOSIS — M70.62 TROCHANTERIC BURSITIS OF LEFT HIP: Primary | ICD-10-CM

## 2021-01-27 DIAGNOSIS — M25.552 LEFT HIP PAIN: ICD-10-CM

## 2021-01-27 PROCEDURE — 20610 DRAIN/INJ JOINT/BURSA W/O US: CPT | Performed by: ORTHOPAEDIC SURGERY

## 2021-01-27 PROCEDURE — 99213 OFFICE O/P EST LOW 20 MIN: CPT | Performed by: ORTHOPAEDIC SURGERY

## 2021-01-27 RX ADMIN — TRIAMCINOLONE ACETONIDE 40 MG: 40 INJECTION, SUSPENSION INTRA-ARTICULAR; INTRAMUSCULAR at 08:42

## 2021-01-27 RX ADMIN — LIDOCAINE HYDROCHLORIDE 2 ML: 10 INJECTION, SOLUTION INFILTRATION; PERINEURAL at 08:42

## 2021-01-27 NOTE — PROGRESS NOTES
"Steve Bethea is a 80 y.o. male returns for     Chief Complaint   Patient presents with   • Left Hip - Follow-up       HISTORY OF PRESENT ILLNESS:  Follow up left hip pain. Therapy has been completed.  He reports some improvement.    He doesn't remember picking up the Meloxicam.  Has done some PT exercises.  Did overdo it on day and had severe pain the next day.  Still having left hip pain.       CONCURRENT MEDICAL HISTORY:    The following portions of the patient's history were reviewed and updated as appropriate: allergies, current medications, past family history, past medical history, past social history, past surgical history and problem list.     ROS  No fevers or chills.  No chest pain or shortness of air.  No GI or  disturbances.    PHYSICAL EXAMINATION:       Ht 177.8 cm (70\")   Wt 71.7 kg (158 lb)   BMI 22.67 kg/m²     Physical Exam  Constitutional:       General: He is not in acute distress.     Appearance: Normal appearance. He is well-developed. He is not ill-appearing.   Pulmonary:      Effort: Pulmonary effort is normal. No respiratory distress.   Neurological:      Mental Status: He is alert and oriented to person, place, and time.   Psychiatric:         Mood and Affect: Mood normal.         Behavior: Behavior normal.         Thought Content: Thought content normal.         Judgment: Judgment normal.         GAIT:     [x]  Normal  []  Antalgic    Assistive device: [x]  None  []  Walker     []  Crutches  []  Cane     []  Wheelchair  []  Stretcher    Left Hip Exam     Tenderness   Left hip tenderness location: Mild tenderness over the greater trochanter.    Range of Motion   The patient has normal left hip ROM.    Muscle Strength   The patient has normal left hip strength.     Tests   CATHY: negative  Fadir:  Negative FADIR test    Other   Erythema: absent  Sensation: normal  Pulse: present                      ASSESSMENT:    Diagnoses and all orders for this visit:    Trochanteric bursitis of " left hip  -     Large Joint Arthrocentesis: L greater trochanteric bursa    Left hip pain  -     Large Joint Arthrocentesis: L greater trochanteric bursa    Gastroesophageal reflux disease without esophagitis    Essential hypertension          PLAN    Will  Try steroid injection today into trochanteric bursa.  Continue HEP  Continue with physical therapy exercises  He was encouraged to give the NSAID a try and to watch for bleeding, increased bruising, or upset stomach.    Recheck in 6 weeks.    Large Joint Arthrocentesis: L greater trochanteric bursa  Date/Time: 1/27/2021 8:42 AM  Consent given by: patient  Site marked: site marked  Timeout: Immediately prior to procedure a time out was called to verify the correct patient, procedure, equipment, support staff and site/side marked as required   Supporting Documentation  Indications: pain   Procedure Details  Location: hip - L greater trochanteric bursa  Preparation: Patient was prepped and draped in the usual sterile fashion  Needle size: 22 G  Approach: lateral  Medications administered: 40 mg triamcinolone acetonide 40 MG/ML; 2 mL lidocaine 1 %  Patient tolerance: patient tolerated the procedure well with no immediate complications            Patient's Body mass index is 22.67 kg/m². BMI is within normal parameters. No follow-up required..      Return in about 6 weeks (around 3/10/2021) for recheck.    Catalino Graham MD

## 2021-01-29 RX ORDER — TRIAMCINOLONE ACETONIDE 40 MG/ML
40 INJECTION, SUSPENSION INTRA-ARTICULAR; INTRAMUSCULAR
Status: COMPLETED | OUTPATIENT
Start: 2021-01-27 | End: 2021-01-27

## 2021-01-29 RX ORDER — LIDOCAINE HYDROCHLORIDE 10 MG/ML
2 INJECTION, SOLUTION INFILTRATION; PERINEURAL
Status: COMPLETED | OUTPATIENT
Start: 2021-01-27 | End: 2021-01-27

## 2021-02-02 ENCOUNTER — OFFICE VISIT (OUTPATIENT)
Dept: CARDIOLOGY | Facility: CLINIC | Age: 81
End: 2021-02-02

## 2021-02-02 VITALS
HEART RATE: 62 BPM | OXYGEN SATURATION: 98 % | WEIGHT: 158 LBS | HEIGHT: 70 IN | BODY MASS INDEX: 22.62 KG/M2 | DIASTOLIC BLOOD PRESSURE: 66 MMHG | SYSTOLIC BLOOD PRESSURE: 140 MMHG

## 2021-02-02 DIAGNOSIS — I25.10 CORONARY ARTERIOSCLEROSIS: Primary | ICD-10-CM

## 2021-02-02 PROCEDURE — 99203 OFFICE O/P NEW LOW 30 MIN: CPT | Performed by: INTERNAL MEDICINE

## 2021-02-02 NOTE — PROGRESS NOTES
[facility] Cardiology  OFFICE CONSULT NOTE    Patient Name: Steve Bethea  Age/Sex: 80 y.o. male  : 1940  MRN: [id number]      Referring Provider: Maria Teresa Torres, APRN  [facility] [place] [address]        Chief Complaint: Coronary artery disease    History of Present Illness:  Steve Bethea is a 80 y.o. male who is here for a first appointment.  He has a history of coronary artery disease involving a stent done in  with a Promus to the LAD and then one done in 2017.  The one done in 2017 was 2 ABSORB absorbable stent to a intermedius ramus..  He is switching to physicians at this time and has really no new angina.  He is taking all his medicines he is having no shortness of air or diaphoresis.    Last Echo: 2020    Last Cath:       Past Medical History:  Past Medical History:   Diagnosis Date   • Coronary arteriosclerosis    • Degenerative joint disease involving multiple joints    • Hypertension    • Plantar fasciitis    • Sleep apnea    • Upper respiratory infection        PAST SURGERY   Past Surgical History:   Procedure Laterality Date   • ANGIOPLASTY  12/17/2012    x 1 stent   • COLONOSCOPY     • ENDOSCOPY N/A 2018    Procedure: ESOPHAGOGASTRODUODENOSCOPY possible dilation;  Surgeon: Praneeth Ignacio MD;  Location: [facility] ENDOSCOPY;  Service: Gastroenterology   • EYE SURGERY     • INJECTION OF MEDICATION  2013    Inj(s) Tend-Sheath, Ligament, Single 21524 (1)         Home Medications:      Current Outpatient Medications:   •  aspirin 81 MG EC tablet, Take 81 mg by mouth Daily., Disp: , Rfl:   •  atorvastatin (LIPITOR) 80 MG tablet, Take 80 mg by mouth., Disp: , Rfl:   •  Cholecalciferol 2000 UNITS tablet, Take 2,000 Units by mouth., Disp: , Rfl:   •  clopidogrel (PLAVIX) 75 MG tablet, Take 75 mg by mouth., Disp: , Rfl:   •  enalapril (VASOTEC) 20 MG tablet, Take 20 mg by mouth., Disp: , Rfl:   •  Flaxseed, Linseed, (FLAXSEED OIL PO),  Take  by mouth., Disp: , Rfl:   •  hydroCHLOROthiazide (MICROZIDE) 12.5 MG capsule, Take 12.5 mg by mouth Daily., Disp: , Rfl:   •  Magnesium 250 MG tablet, Take  by mouth., Disp: , Rfl:   •  nitroglycerin (NITROSTAT) 0.4 MG SL tablet, Place 0.4 mg under the tongue Every 5 (Five) Minutes., Disp: , Rfl:   •  Omega-3 Fatty Acids (FISH OIL PO), Take 1 capsule by mouth., Disp: , Rfl:   •  traZODone (DESYREL) 50 MG tablet, Take 1 tablet by mouth Every Night., Disp: 30 tablet, Rfl: 1  •  meloxicam (MOBIC) 15 MG tablet, Take 1 tablet by mouth Daily., Disp: 30 tablet, Rfl: 2    Allergies:  No Known Allergies     Social History:  Social History     Socioeconomic History   • Marital status: Single     Spouse name: Not on file   • Number of children: Not on file   • Years of education: Not on file   • Highest education level: Not on file   Tobacco Use   • Smoking status: Former Smoker   • Smokeless tobacco: Never Used   Substance and Sexual Activity   • Alcohol use: No   • Drug use: No   • Sexual activity: Defer        Family History:  Family History   Problem Relation Age of Onset   • Alzheimer's disease Mother    • Hypertension Mother    • Colon cancer Father    • Hypertension Father    • Stroke Other         uncle   • Heart disease Other    • Cancer Brother          Review of Systems:   Constitution: No chills, no rigors, no unexplained weight loss or weight gain    Eyes:  No diplopia, no blurred vision, no loss of vision, conjunctiva is pink and sclera is anicteric    ENT:  No tinnitus, no otorrhea, no epistaxis, no sore throat   Respiratory: No cough, no hemoptysis    Cardiovascular:  As mentioned in HPI    Gastrointestinal: No nausea, no vomiting, no hematemesis, no diarrhea or constipation, no melena    Genitourinary: No frequency of dysuria no hematuria    Integument: positive for  No pruritis and  no skin rash    Hematologic / Lymphatic: No excessive bleeding, easy bruising, fatigue, lymphadenopathy and  petechiae    Musculoskeletal: No joint pain, joint stiffness, joint swelling, muscle pain, muscle weakness and neck pain    Neurological: No dizziness, headaches, light headedness, seizures and vertigo or stroke    Behavioral/Psych: No depression, or bipolar disorder    Endocrine: no h/o diabetes, hyperlipidemia or thyroid problems        Vitals:    02/02/21 0852   BP: 140/66   Pulse: 62   SpO2: 98%       Body mass index is 22.67 kg/m².          Physical Exam:   General Appearance:    Alert, oriented, cooperative, in no acute distress     Head:    Normocephalic, atraumatic, without obvious abnormality     Eyes:            Lids and lashes normal, conjunctivae and sclerae normal, no   icterus, no pallor     Ears:    Ears appear intact with no abnormalities noted     Throat:   Mucous membranes pink and moist     Neck:   Supple, trachea midline, no carotid bruit, no JVD     Lungs:     Air enty equal,  Clear to auscultation, respirations regular, Unlabored. No wheezes, rales, rhonchi    Heart:    Regular rhythm and normal rate, normal S1 and S2, no            murmur, no gallop,      Abdomen:     Normal bowel sounds, no masses, liver and spleen nonpalpable, soft, non-tender, non-distended, no guarding, no rebound tenderness       Extremities:   Moves all extremities well, no edema, no cyanosis, no              Redness, no clubbing     Pulses:   Pulses palpable and equal bilaterally       Neurologic:   Alert and oriented to person, place, and time. No focal neurological deficits       Lab Review:     Admission on 11/24/2020, Discharged on 11/24/2020   Component Date Value Ref Range Status   • SARS-CoV-2, LAURA 11/24/2020 Not Detected  Not Detected Final    Comment: This nucleic acid amplification test was developed and its performance  characteristics determined by [employer]. Nucleic acid  amplification tests include PCR and TMA. This test has not been FDA  cleared or approved. This test has been authorized by FDA  under an  Emergency Use Authorization (EUA). This test is only authorized for  the duration of time the declaration that circumstances exist  justifying the authorization of the emergency use of in vitro  diagnostic tests for detection of SARS-CoV-2 virus and/or diagnosis  of COVID-19 infection under section 564(b)(1) of the Act, 21 U.S.C.  360bbb-3(b) (1), unless the authorization is terminated or revoked  sooner.  When diagnostic testing is negative, the possibility of a false  negative result should be considered in the context of a patient's  recent exposures and the presence of clinical signs and symptoms  consistent with COVID-19. An individual without symptoms of COVID-19  and who is not shedding SARS-CoV-2 virus would                            expect to have a  negative (not detected) result in this assay.   ]    Assessment/Plan     1. Coronary arteriosclerosis  Is a history of an ABSORB coronary stent.  He is having no issues at the present time.  He is going to continue his medicines.  He is going to obtain any records that he has had that are not available and we will review this.-While we do note is that on his last echo the EF was reported to be in the 30 to 40% range however his nuclear showed normal LV systolic function.  He had no clear-cut ischemia.  He actually refused the ECG secondary to having one tomorrow.   2.  Hypertension  This is borderline.  We will continue to observe this.  3.  Hyperlipidemia  His cholesterol is under good control.     Patient's Body mass index is 22.67 kg/m². BMI is within normal parameters. No follow-up required..      Steve Bethea  reports that he has quit smoking. He has never used smokeless tobacco..           Return in about 6 months (around 8/2/2021).

## 2021-02-11 ENCOUNTER — OFFICE VISIT (OUTPATIENT)
Dept: FAMILY MEDICINE CLINIC | Facility: CLINIC | Age: 81
End: 2021-02-11

## 2021-02-11 DIAGNOSIS — Z00.00 MEDICARE ANNUAL WELLNESS VISIT, SUBSEQUENT: Primary | ICD-10-CM

## 2021-02-11 PROCEDURE — G0439 PPPS, SUBSEQ VISIT: HCPCS | Performed by: NURSE PRACTITIONER

## 2021-02-11 NOTE — PROGRESS NOTES
The ABCs of the Annual Wellness Visit  Subsequent Medicare Wellness Visit    No chief complaint on file.      Subjective   History of Present Illness:  Steve Bethea is a 80 y.o. male who presents for a Subsequent Medicare Wellness Visit.    HEALTH RISK ASSESSMENT    Recent Hospitalizations:  No hospitalization(s) within the last year.    Current Medical Providers:  Patient Care Team:  Maria Teresa Torres APRN as PCP - General (Family Medicine)    Smoking Status:  Social History     Tobacco Use   Smoking Status Former Smoker   Smokeless Tobacco Never Used       Alcohol Consumption:  Social History     Substance and Sexual Activity   Alcohol Use No       Depression Screen:   PHQ-2/PHQ-9 Depression Screening 1/8/2020   Little interest or pleasure in doing things 0   Feeling down, depressed, or hopeless 0   Total Score 0       Fall Risk Screen:  STEADI Fall Risk Assessment has not been completed.    Health Habits and Functional and Cognitive Screening:  Functional & Cognitive Status 1/8/2020   Do you have difficulty preparing food and eating? No   Do you have difficulty bathing yourself, getting dressed or grooming yourself? No   Do you have difficulty using the toilet? No   Do you have difficulty moving around from place to place? No   Do you have trouble with steps or getting out of a bed or a chair? No   Current Diet Well Balanced Diet   Dental Exam Not up to date   Eye Exam Not up to date   Exercise (times per week) 3 times per week   Current Exercise Activities Include Walking   Do you need help using the phone?  No   Are you deaf or do you have serious difficulty hearing?  No   Do you need help with transportation? No   Do you need help shopping? No   Do you need help preparing meals?  No   Do you need help with housework?  No   Do you need help with laundry? No   Do you need help taking your medications? No   Do you need help managing money? No   Do you ever drive or ride in a car without wearing a seat belt?  No   Have you felt unusual stress, anger or loneliness in the last month? No   Who do you live with? Alone   If you need help, do you have trouble finding someone available to you? Yes   Have you been bothered in the last four weeks by sexual problems? No   Do you have difficulty concentrating, remembering or making decisions? No         Does the patient have evidence of cognitive impairment? No    Asprin use counseling:Taking ASA appropriately as indicated    Age-appropriate Screening Schedule:  Refer to the list below for future screening recommendations based on patient's age, sex and/or medical conditions. Orders for these recommended tests are listed in the plan section. The patient has been provided with a written plan.    Health Maintenance   Topic Date Due   • TDAP/TD VACCINES (1 - Tdap) 09/16/1959   • ZOSTER VACCINE (2 of 2) 04/17/2018   • COLONOSCOPY  05/11/2020   • LIPID PANEL  06/10/2021   • INFLUENZA VACCINE  Completed          The following portions of the patient's history were reviewed and updated as appropriate: allergies, current medications, past family history, past medical history, past social history, past surgical history and problem list.    Outpatient Medications Prior to Visit   Medication Sig Dispense Refill   • aspirin 81 MG EC tablet Take 81 mg by mouth Daily.     • atorvastatin (LIPITOR) 80 MG tablet Take 80 mg by mouth.     • Cholecalciferol 2000 UNITS tablet Take 2,000 Units by mouth.     • clopidogrel (PLAVIX) 75 MG tablet Take 75 mg by mouth.     • enalapril (VASOTEC) 20 MG tablet Take 20 mg by mouth.     • Flaxseed, Linseed, (FLAXSEED OIL PO) Take  by mouth.     • hydroCHLOROthiazide (MICROZIDE) 12.5 MG capsule Take 12.5 mg by mouth Daily.     • Magnesium 250 MG tablet Take  by mouth.     • meloxicam (MOBIC) 15 MG tablet Take 1 tablet by mouth Daily. 30 tablet 2   • nitroglycerin (NITROSTAT) 0.4 MG SL tablet Place 0.4 mg under the tongue Every 5 (Five) Minutes.     • Omega-3 Fatty  Acids (FISH OIL PO) Take 1 capsule by mouth.     • traZODone (DESYREL) 50 MG tablet Take 1 tablet by mouth Every Night. 30 tablet 1     No facility-administered medications prior to visit.        Patient Active Problem List   Diagnosis   • Acute non-recurrent maxillary sinusitis   • Hypertension   • Coronary arteriosclerosis   • Weight loss   • Bilious vomiting with nausea   • Gastroesophageal reflux disease   • Degenerative joint disease involving multiple joints   • Chronic pain of right knee   • Effusion of knee joint right   • Internal derangement of right knee   • Primary osteoarthritis of right knee   • Degenerative tear of posterior horn of medial meniscus, right   • Mitral valve sclerosis   • Aortic valve sclerosis   • Unstable angina (CMS/HCC)   • Slow heart rate   • Laceration of thumb   • H/O heart artery stent   • Fall at home   • Dizziness   • Contusion, hip and thigh   • Constipation   • Benign prostatic hyperplasia without urinary obstruction   • Hypertension   • Nausea & vomiting       Advanced Care Planning:  ACP discussion was declined by the patient. Patient does not have an advance directive, declines further assistance.    Review of Systems    Compared to one year ago, the patient feels his physical health is the same.  Compared to one year ago, the patient feels his mental health is the same.    Reviewed chart for potential of high risk medication in the elderly: yes  Reviewed chart for potential of harmful drug interactions in the elderly:yes    Objective       There were no vitals filed for this visit.    There is no height or weight on file to calculate BMI.  Discussed the patient's BMI with him. The BMI is in the acceptable range.    Physical Exam          Assessment/Plan   Medicare Risks and Personalized Health Plan  CMS Preventative Services Quick Reference  Advance Directive Discussion  Colon Cancer Screening  Fall Risk  Immunizations Discussed/Encouraged (specific immunizations; Td and  Shingrix )  Polypharmacy    The above risks/problems have been discussed with the patient.  Pertinent information has been shared with the patient in the After Visit Summary.  Follow up plans and orders are seen below in the Assessment/Plan Section.    Diagnoses and all orders for this visit:    1. Medicare annual wellness visit, subsequent (Primary)      Follow Up:  Return if symptoms worsen or fail to improve, for Recheck, or sooner as needed.     An After Visit Summary and PPPS were given to the patient.     States will have tetanus when he comes in to the office next time and shingrix is prescribed for him to have at the pharmacy

## 2021-03-10 ENCOUNTER — OFFICE VISIT (OUTPATIENT)
Dept: ORTHOPEDIC SURGERY | Facility: CLINIC | Age: 81
End: 2021-03-10

## 2021-03-10 VITALS — WEIGHT: 162 LBS | HEIGHT: 70 IN | BODY MASS INDEX: 23.19 KG/M2

## 2021-03-10 DIAGNOSIS — M25.552 LEFT HIP PAIN: Primary | ICD-10-CM

## 2021-03-10 DIAGNOSIS — M70.62 TROCHANTERIC BURSITIS OF LEFT HIP: ICD-10-CM

## 2021-03-10 PROCEDURE — 99213 OFFICE O/P EST LOW 20 MIN: CPT | Performed by: ORTHOPAEDIC SURGERY

## 2021-03-10 RX ORDER — MULTIVIT-MIN/IRON/FOLIC ACID/K 18-600-40
CAPSULE ORAL
COMMUNITY
End: 2022-04-18 | Stop reason: ALTCHOICE

## 2021-03-10 NOTE — PROGRESS NOTES
"Steve Bethea is a 80 y.o. male returns for     Chief Complaint   Patient presents with   • Left Hip - Follow-up       HISTORY OF PRESENT ILLNESS:  Follow up left hip pain.  Injection on 1/27/21.  He reports improvement in pain.  He does not take the Meloxicam regularly.  Not having pain  Has a new mattress and is sleeping much better.  Mobilizing well.  He is walking for exercise         CONCURRENT MEDICAL HISTORY:    The following portions of the patient's history were reviewed and updated as appropriate: allergies, current medications, past family history, past medical history, past social history, past surgical history and problem list.     ROS  No fevers or chills.  No chest pain or shortness of air.  No GI or  disturbances.    PHYSICAL EXAMINATION:       Ht 177.8 cm (70\")   Wt 73.5 kg (162 lb)   BMI 23.24 kg/m²     Physical Exam  Constitutional:       General: He is not in acute distress.     Appearance: Normal appearance. He is well-developed. He is not ill-appearing.   Pulmonary:      Effort: Pulmonary effort is normal. No respiratory distress.   Neurological:      Mental Status: He is alert and oriented to person, place, and time.   Psychiatric:         Mood and Affect: Mood normal.         Behavior: Behavior normal.         Thought Content: Thought content normal.         Judgment: Judgment normal.         GAIT:     [x]  Normal  []  Antalgic    Assistive device: [x]  None  []  Walker     []  Crutches  []  Cane     []  Wheelchair  []  Stretcher    Left Hip Exam     Tenderness   The patient is experiencing no tenderness.     Range of Motion   The patient has normal left hip ROM.    Muscle Strength   The patient has normal left hip strength.     Tests   CATHY: negative  Fadir:  Negative FADIR test    Other   Erythema: absent  Sensation: normal  Pulse: present                        ASSESSMENT:    Diagnoses and all orders for this visit:    Left hip pain    Trochanteric bursitis of left hip    Other " orders  -     Ascorbic Acid (Vitamin C) 500 MG capsule; Take  by mouth.          PLAN    Activity as tolerated.  Continue general strength and conditioning  Continue home exercises  F/u as needed.    Patient's Body mass index is 23.24 kg/m². BMI is within normal parameters. No follow-up required..      Return if symptoms worsen or fail to improve, for recheck.    Catalino Graham MD

## 2021-03-18 ENCOUNTER — OFFICE VISIT (OUTPATIENT)
Dept: FAMILY MEDICINE CLINIC | Facility: CLINIC | Age: 81
End: 2021-03-18

## 2021-03-18 ENCOUNTER — LAB (OUTPATIENT)
Dept: LAB | Facility: OTHER | Age: 81
End: 2021-03-18

## 2021-03-18 DIAGNOSIS — R05.9 COUGH: ICD-10-CM

## 2021-03-18 DIAGNOSIS — J06.9 ACUTE URI: ICD-10-CM

## 2021-03-18 DIAGNOSIS — J06.9 ACUTE URI: Primary | ICD-10-CM

## 2021-03-18 LAB
FLUAV AG NPH QL: NEGATIVE
FLUBV AG NPH QL IA: NEGATIVE

## 2021-03-18 PROCEDURE — G2025 DIS SITE TELE SVCS RHC/FQHC: HCPCS | Performed by: NURSE PRACTITIONER

## 2021-03-18 PROCEDURE — 87804 INFLUENZA ASSAY W/OPTIC: CPT | Performed by: NURSE PRACTITIONER

## 2021-03-18 PROCEDURE — 87635 SARS-COV-2 COVID-19 AMP PRB: CPT | Performed by: NURSE PRACTITIONER

## 2021-03-18 RX ORDER — BROMPHENIRAMINE MALEATE, PSEUDOEPHEDRINE HYDROCHLORIDE, AND DEXTROMETHORPHAN HYDROBROMIDE 2; 30; 10 MG/5ML; MG/5ML; MG/5ML
5 SYRUP ORAL 4 TIMES DAILY PRN
Qty: 118 ML | Refills: 0 | Status: SHIPPED | OUTPATIENT
Start: 2021-03-18 | End: 2022-04-13

## 2021-03-18 RX ORDER — METHYLPREDNISOLONE 4 MG/1
TABLET ORAL
Qty: 1 EACH | Refills: 0 | Status: SHIPPED | OUTPATIENT
Start: 2021-03-18 | End: 2022-04-13

## 2021-03-18 RX ORDER — CEFDINIR 300 MG/1
300 CAPSULE ORAL 2 TIMES DAILY
Qty: 20 CAPSULE | Refills: 0 | Status: SHIPPED | OUTPATIENT
Start: 2021-03-18 | End: 2021-03-28

## 2021-03-18 NOTE — PROGRESS NOTES
Chief Complaint  Nasal Congestion and Fever    Subjective    Patient here today for telehealth visit with complaints of cough congestion fever chills body aches he says that he was out in public last Tuesday and Wednesday and then his symptoms began after that.  He has had some shortness of breath uses CPAP but does not feel he gets helping.  He has had flu shot and states that he has had one Covid vaccine already.  Has not checked his temperature but says that he has had fever chills and sweats.        Steve Bethea presents to [facility]  Fever   This is a new problem. The current episode started in the past 7 days. The problem occurs intermittently. The problem has been unchanged. Associated symptoms include congestion, coughing, headaches, muscle aches and a sore throat. He has tried NSAIDs, fluids and acetaminophen for the symptoms. The treatment provided moderate relief.   Cough  This is a new problem. The current episode started in the past 7 days. The problem has been unchanged. The problem occurs every few minutes. Associated symptoms include a fever, headaches, nasal congestion, postnasal drip, rhinorrhea, a sore throat and shortness of breath. He has tried nothing for the symptoms. The treatment provided no relief.       Objective   Vital Signs:   There were no vitals taken for this visit.    Physical Exam deferred, telehealth visit  Result Review :                 Assessment and Plan    Diagnoses and all orders for this visit:    1. Acute URI (Primary)  -     COVID-19, [facility] IN-HOUSE, NP SWAB IN TRANSPORT MEDIA 8-10 HR TAT - Swab, Anterior nasal; Future  -     Influenza Antigen, Rapid - Swab, Nasopharynx  -     brompheniramine-pseudoephedrine-DM 30-2-10 MG/5ML syrup; Take 5 mL by mouth 4 (Four) Times a Day As Needed for Congestion or Cough.  Dispense: 118 mL; Refill: 0  -     cefdinir (OMNICEF) 300 MG capsule; Take 1 capsule by mouth 2 (Two) Times a Day for 10 days.   Dispense: 20 capsule; Refill: 0  -     methylPREDNISolone (MEDROL) 4 MG dose pack; Take as directed on package instructions.  Dispense: 1 each; Refill: 0    2. Cough  -     COVID-19, [facility] IN-HOUSE, NP SWAB IN TRANSPORT MEDIA 8-10 HR TAT - Swab, Anterior nasal; Future  -     Influenza Antigen, Rapid - Swab, Nasopharynx  -     brompheniramine-pseudoephedrine-DM 30-2-10 MG/5ML syrup; Take 5 mL by mouth 4 (Four) Times a Day As Needed for Congestion or Cough.  Dispense: 118 mL; Refill: 0  -     cefdinir (OMNICEF) 300 MG capsule; Take 1 capsule by mouth 2 (Two) Times a Day for 10 days.  Dispense: 20 capsule; Refill: 0  -     methylPREDNISolone (MEDROL) 4 MG dose pack; Take as directed on package instructions.  Dispense: 1 each; Refill: 0      I spent 20 minutes caring for Steve on this date of service. This time includes time spent by me in the following activities:preparing for the visit, counseling and educating the patient/family/caregiver, ordering medications, tests, or procedures and documenting information in the medical record  Follow Up   Return if symptoms worsen or fail to improve, for Recheck, or sooner as needed.  Patient was given instructions and counseling regarding his condition or for health maintenance advice. Please see specific information pulled into the AVS if appropriate.   I will swab him for flu and Covid and he is given instruction on how to come here to have those performed, will inform of results via phone, advised to rest use Tylenol Motrin as needed fever pain etc. and push fluids, needs to return here or urgent care or ER if symptoms do persist or worsen.  He is given Bromfed as needed cough, Omnicef and Medrol Dosepak as above.  All questions and concerns addressed with understanding verbalized.  Patient aware and in agreement to this plan.  You have chosen to receive care through a telephone visit. Do you consent to use a telephone visit for your medical care today? Yes  This visit  has been rescheduled as a phone visit to comply with patient safety concerns in accordance with CDC recommendations. Total time of discussion was 20 minutes.

## 2021-03-19 LAB — SARS-COV-2 N GENE RESP QL NAA+PROBE: NOT DETECTED

## 2021-07-14 ENCOUNTER — TRANSCRIBE ORDERS (OUTPATIENT)
Dept: LAB | Facility: OTHER | Age: 81
End: 2021-07-14

## 2021-07-14 DIAGNOSIS — R97.20 ELEVATED PROSTATE SPECIFIC ANTIGEN (PSA): Primary | ICD-10-CM

## 2021-07-15 ENCOUNTER — LAB (OUTPATIENT)
Dept: LAB | Facility: OTHER | Age: 81
End: 2021-07-15

## 2021-07-15 DIAGNOSIS — R97.20 ELEVATED PROSTATE SPECIFIC ANTIGEN (PSA): ICD-10-CM

## 2021-07-15 PROCEDURE — 36415 COLL VENOUS BLD VENIPUNCTURE: CPT | Performed by: INTERNAL MEDICINE

## 2021-07-15 PROCEDURE — 84153 ASSAY OF PSA TOTAL: CPT | Performed by: PHYSICIAN ASSISTANT

## 2021-07-16 LAB — PSA SERPL-MCNC: 4.34 NG/ML (ref 0–4)

## 2021-08-02 ENCOUNTER — TRANSCRIBE ORDERS (OUTPATIENT)
Dept: GENERAL RADIOLOGY | Facility: CLINIC | Age: 81
End: 2021-08-02

## 2021-08-02 ENCOUNTER — LAB (OUTPATIENT)
Dept: LAB | Facility: OTHER | Age: 81
End: 2021-08-02

## 2021-08-02 DIAGNOSIS — R97.20 ELEVATED PROSTATE SPECIFIC ANTIGEN (PSA): ICD-10-CM

## 2021-08-02 DIAGNOSIS — R97.20 ELEVATED PROSTATE SPECIFIC ANTIGEN (PSA): Primary | ICD-10-CM

## 2021-08-02 LAB — PSA SERPL-MCNC: 3.75 NG/ML (ref 0–4)

## 2021-08-02 PROCEDURE — 36415 COLL VENOUS BLD VENIPUNCTURE: CPT | Performed by: INTERNAL MEDICINE

## 2021-08-02 PROCEDURE — 84153 ASSAY OF PSA TOTAL: CPT | Performed by: PHYSICIAN ASSISTANT

## 2022-02-01 ENCOUNTER — TRANSCRIBE ORDERS (OUTPATIENT)
Dept: LAB | Facility: OTHER | Age: 82
End: 2022-02-01

## 2022-02-01 DIAGNOSIS — R97.20 ELEVATED PROSTATE SPECIFIC ANTIGEN (PSA): Primary | ICD-10-CM

## 2022-02-07 ENCOUNTER — LAB (OUTPATIENT)
Dept: LAB | Facility: OTHER | Age: 82
End: 2022-02-07

## 2022-02-07 DIAGNOSIS — R97.20 ELEVATED PROSTATE SPECIFIC ANTIGEN (PSA): ICD-10-CM

## 2022-02-07 PROCEDURE — G0103 PSA SCREENING: HCPCS | Performed by: PHYSICIAN ASSISTANT

## 2022-02-07 PROCEDURE — 36415 COLL VENOUS BLD VENIPUNCTURE: CPT | Performed by: INTERNAL MEDICINE

## 2022-02-07 PROCEDURE — 84153 ASSAY OF PSA TOTAL: CPT | Performed by: PHYSICIAN ASSISTANT

## 2022-02-08 LAB — PSA SERPL-MCNC: 4.67 NG/ML (ref 0–4)

## 2022-02-13 ENCOUNTER — HOSPITAL ENCOUNTER (EMERGENCY)
Facility: HOSPITAL | Age: 82
Discharge: HOME | End: 2022-02-13
Attending: FAMILY MEDICINE | Admitting: FAMILY MEDICINE

## 2022-02-13 ENCOUNTER — APPOINTMENT (OUTPATIENT)
Dept: GENERAL RADIOLOGY | Facility: HOSPITAL | Age: 82
End: 2022-02-13

## 2022-02-13 VITALS
HEIGHT: 70 IN | SYSTOLIC BLOOD PRESSURE: 130 MMHG | RESPIRATION RATE: 20 BRPM | DIASTOLIC BLOOD PRESSURE: 80 MMHG | TEMPERATURE: 97.6 F | WEIGHT: 151 LBS | OXYGEN SATURATION: 99 % | HEART RATE: 58 BPM | BODY MASS INDEX: 21.62 KG/M2

## 2022-02-13 DIAGNOSIS — K59.00 CONSTIPATION, UNSPECIFIED CONSTIPATION TYPE: Primary | ICD-10-CM

## 2022-02-13 PROCEDURE — 99283 EMERGENCY DEPT VISIT LOW MDM: CPT

## 2022-02-13 PROCEDURE — 74022 RADEX COMPL AQT ABD SERIES: CPT

## 2022-02-13 RX ORDER — ATORVASTATIN CALCIUM 80 MG/1
1 TABLET, FILM COATED ORAL DAILY
COMMUNITY
Start: 2021-11-29 | End: 2022-04-13 | Stop reason: SDUPTHER

## 2022-02-13 RX ORDER — HYDROCHLOROTHIAZIDE 12.5 MG/1
1 CAPSULE, GELATIN COATED ORAL DAILY
COMMUNITY
Start: 2021-11-19

## 2022-02-13 RX ORDER — CLOPIDOGREL BISULFATE 75 MG/1
1 TABLET ORAL DAILY
COMMUNITY
Start: 2021-11-29 | End: 2022-04-13 | Stop reason: SDUPTHER

## 2022-02-13 NOTE — ED PROVIDER NOTES
Subjective   81-year-old male in the emergency department complaining some abdominal discomfort that is mild for approximately 1 week that wax and wane.  Reports last bowel movement 1 week ago.  He tells me he has battled constipation in the past      History provided by:  Patient   used: No        Review of Systems   Constitutional: Negative for chills and fatigue.   HENT: Negative for congestion.    Respiratory: Negative for shortness of breath.    Cardiovascular: Negative for chest pain and palpitations.   Gastrointestinal: Positive for abdominal pain and constipation. Negative for nausea and vomiting.   Genitourinary: Negative for flank pain.   Musculoskeletal: Negative for gait problem.   Skin: Negative for wound.   Allergic/Immunologic: Negative for immunocompromised state.   Neurological: Negative for weakness.   Hematological: Negative for adenopathy.   Psychiatric/Behavioral: Negative for confusion.   All other systems reviewed and are negative.      Past Medical History:   Diagnosis Date   • Coronary arteriosclerosis    • Degenerative joint disease involving multiple joints    • Hypertension    • Plantar fasciitis    • Sleep apnea    • Upper respiratory infection        No Known Allergies    Past Surgical History:   Procedure Laterality Date   • ANGIOPLASTY  12/17/2012    x 1 stent   • COLONOSCOPY     • ENDOSCOPY N/A 5/11/2018    Procedure: ESOPHAGOGASTRODUODENOSCOPY possible dilation;  Surgeon: Praneeth Ignacio MD;  Location: [facility] ENDOSCOPY;  Service: Gastroenterology   • EYE SURGERY     • INJECTION OF MEDICATION  04/03/2013    Inj(s) Tend-Sheath, Ligament, Single 68611 (1)         Family History   Problem Relation Age of Onset   • Alzheimer's disease Mother    • Hypertension Mother    • Colon cancer Father    • Hypertension Father    • Stroke Other         uncle   • Heart disease Other    • Cancer Brother        Social History     Socioeconomic History   • Marital status: Single    Tobacco Use   • Smoking status: Former Smoker   • Smokeless tobacco: Never Used   Substance and Sexual Activity   • Alcohol use: No   • Drug use: No   • Sexual activity: Defer           Objective   Physical Exam  Vitals and nursing note reviewed.   Constitutional:       Appearance: He is well-developed.   HENT:      Head: Normocephalic.      Nose: Nose normal.   Eyes:      Conjunctiva/sclera: Conjunctivae normal.      Pupils: Pupils are equal, round, and reactive to light.   Cardiovascular:      Rate and Rhythm: Normal rate and regular rhythm.      Heart sounds: Normal heart sounds.   Pulmonary:      Effort: Pulmonary effort is normal.      Breath sounds: Normal breath sounds.   Abdominal:      Palpations: Abdomen is soft.   Musculoskeletal:         General: Normal range of motion.      Cervical back: Normal range of motion.   Skin:     General: Skin is warm and dry.   Neurological:      Mental Status: He is alert and oriented to person, place, and time.      GCS: GCS eye subscore is 4. GCS verbal subscore is 5. GCS motor subscore is 6.         Procedures           ED Course      XR Abdomen 2+ VW with Chest 1 VW   Final Result   Increased stool in the colon and rectum. Abdomen is   otherwise unremarkable. Lung fields clear.      Electronically signed by:  Daniel Blanco MD  2/13/2022 12:20 PM CST   Workstation: [id number]        Suds enema given in ED. Follow up with pcp                                            MDM    Final diagnoses:   Constipation, unspecified constipation type       ED Disposition  ED Disposition     ED Disposition Condition Comment    Discharge Stable           Maria Teresa Torres, APRN  [facility] [place] [address] [phone]    Call in 1 week           Medication List      No changes were made to your prescriptions during this visit.          Joseph Armendariz, APRN  02/13/22 0902

## 2022-02-13 NOTE — ED NOTES
Patient presents to ED with having on going constipation for the last month. His last bowel movement was about a week ago. His father had colon cancer.      Bonny Morrell, RN  02/13/22 0987

## 2022-04-13 ENCOUNTER — OFFICE VISIT (OUTPATIENT)
Dept: FAMILY MEDICINE CLINIC | Facility: CLINIC | Age: 82
End: 2022-04-13

## 2022-04-13 VITALS
DIASTOLIC BLOOD PRESSURE: 80 MMHG | SYSTOLIC BLOOD PRESSURE: 150 MMHG | HEART RATE: 67 BPM | HEIGHT: 70 IN | WEIGHT: 148 LBS | OXYGEN SATURATION: 96 % | BODY MASS INDEX: 21.19 KG/M2

## 2022-04-13 DIAGNOSIS — Z11.59 NEED FOR HEPATITIS C SCREENING TEST: ICD-10-CM

## 2022-04-13 DIAGNOSIS — R97.20 ELEVATED PROSTATE SPECIFIC ANTIGEN (PSA): ICD-10-CM

## 2022-04-13 DIAGNOSIS — Z12.11 ENCOUNTER FOR SCREENING FOR MALIGNANT NEOPLASM OF COLON: ICD-10-CM

## 2022-04-13 DIAGNOSIS — Z00.00 MEDICARE ANNUAL WELLNESS VISIT, SUBSEQUENT: Primary | ICD-10-CM

## 2022-04-13 PROCEDURE — G0439 PPPS, SUBSEQ VISIT: HCPCS | Performed by: NURSE PRACTITIONER

## 2022-04-13 PROCEDURE — 1159F MED LIST DOCD IN RCRD: CPT | Performed by: NURSE PRACTITIONER

## 2022-04-13 RX ORDER — POLYETHYLENE GLYCOL 3350 17 G/17G
POWDER, FOR SOLUTION ORAL
COMMUNITY
End: 2022-05-04

## 2022-04-13 NOTE — PROGRESS NOTES
"Chief Complaint  Prostate Check and Medicare Wellness-subsequent    Subjective          Steve Bethea presents to [facility]  History of Present Illness  Patient here today for referral to urology.  He reports that he has been seeing a urologist for an elevated prostate in [place] but this is getting too difficult for him to travel back and forth and he would like to see someone locally.  He reports he had to go to the ER a few days ago with constipation was informed that he needed to see urology for elevated prostate which he did.  He is also due for Medicare wellness which will be completed today.  Records from Dr. Au reviewed along with his most recent PSA which was 4.67 according to our records.    Objective   Vital Signs:   /80   Pulse 67   Ht 177.8 cm (70\")   Wt 67.1 kg (148 lb)   SpO2 96%   BMI 21.24 kg/m²     BMI is within normal parameters. No follow-up required.      Physical Exam   Result Review :     PSA    PSA 7/15/21 8/2/21 2/7/22   PSA 4.340 (A) 3.750 4.670 (A)   (A) Abnormal value            Data reviewed: Consultant notes Note from urology reviewed          Assessment and Plan    Diagnoses and all orders for this visit:    1. Medicare annual wellness visit, subsequent (Primary)    2. Elevated prostate specific antigen (PSA)  -     Ambulatory Referral to Urology    3. Encounter for screening for malignant neoplasm of colon  -     Ambulatory Referral For Screening Colonoscopy    4. Need for hepatitis C screening test  -     Hepatitis C Antibody; Future    Medicare wellness completed, referral to new urologist is made he is also due for screening colonoscopy and this referral was made as well.  Patient will continue to see Dr. Monahan, cardiology as scheduled, referred to Dr. Carreon urology.  We will follow-up here as needed problems or as scheduled for chronic conditions.  All questions and concerns addressed with understanding " verbalized.  He is aware and in agreement to this plan.  Health maintenance reviewed and updated.    I spent 33 minutes caring for Steve on this date of service. This time includes time spent by me in the following activities:preparing for the visit, reviewing tests, obtaining and/or reviewing a separately obtained history, performing a medically appropriate examination and/or evaluation , counseling and educating the patient/family/caregiver, ordering medications, tests, or procedures, referring and communicating with other health care professionals  and documenting information in the medical record  Follow Up   Return if symptoms worsen or fail to improve, for Recheck, or sooner as needed.  Patient was given instructions and counseling regarding his condition or for health maintenance advice. Please see specific information pulled into the AVS if appropriate.

## 2022-04-13 NOTE — PROGRESS NOTES
The ABCs of the Annual Wellness Visit  Subsequent Medicare Wellness Visit    Chief Complaint   Patient presents with   • Prostate Check   • Medicare Wellness-subsequent      Subjective    History of Present Illness:  Steve Bethea is a 81 y.o. male who presents for a Subsequent Medicare Wellness Visit.    The following portions of the patient's history were reviewed and   updated as appropriate: allergies, current medications, past family history, past medical history, past social history, past surgical history and problem list.    Compared to one year ago, the patient feels his physical   health is the same.    Compared to one year ago, the patient feels his mental   health is the same.    Recent Hospitalizations:  He was not admitted to the hospital during the last year.       Current Medical Providers:  Patient Care Team:  Maria Teresa Torres APRN as PCP - General (Family Medicine)    Outpatient Medications Prior to Visit   Medication Sig Dispense Refill   • Ascorbic Acid (Vitamin C) 500 MG capsule Take  by mouth.     • aspirin 81 MG EC tablet Take 81 mg by mouth Daily.     • atorvastatin (LIPITOR) 80 MG tablet Take 80 mg by mouth.     • Cholecalciferol 2000 UNITS tablet Take 2,000 Units by mouth.     • clopidogrel (PLAVIX) 75 MG tablet Take 75 mg by mouth.     • enalapril (VASOTEC) 20 MG tablet Take 20 mg by mouth.     • Flaxseed, Linseed, (FLAXSEED OIL PO) Take  by mouth.     • hydroCHLOROthiazide (MICROZIDE) 12.5 MG capsule Take 1 capsule by mouth Daily.     • Magnesium 250 MG tablet Take  by mouth.     • nitroglycerin (NITROSTAT) 0.4 MG SL tablet Place 0.4 mg under the tongue Every 5 (Five) Minutes.     • Omega-3 Fatty Acids (FISH OIL PO) Take 1 capsule by mouth.     • traZODone (DESYREL) 50 MG tablet Take 1 tablet by mouth Every Night. 30 tablet 1   • polyethylene glycol (MiraLax) 17 g packet Miralax     • atorvastatin (LIPITOR) 80 MG tablet Take 1 tablet by mouth Daily.     •  brompheniramine-pseudoephedrine-DM 30-2-10 MG/5ML syrup Take 5 mL by mouth 4 (Four) Times a Day As Needed for Congestion or Cough. 118 mL 0   • clopidogrel (Plavix) 75 MG tablet Take 1 tablet by mouth Daily.     • hydroCHLOROthiazide (MICROZIDE) 12.5 MG capsule Take 12.5 mg by mouth Daily.     • meloxicam (MOBIC) 15 MG tablet Take 1 tablet by mouth Daily. 30 tablet 2   • methylPREDNISolone (MEDROL) 4 MG dose pack Take as directed on package instructions. 1 each 0     No facility-administered medications prior to visit.       No opioid medication identified on active medication list. I have reviewed chart for other potential  high risk medication/s and harmful drug interactions in the elderly.          Aspirin is on active medication list. Aspirin use is indicated based on review of current medical condition/s. Pros and cons of this therapy have been discussed today. Benefits of this medication outweigh potential harm.  Patient has been encouraged to continue taking this medication.  .      Patient Active Problem List   Diagnosis   • Acute non-recurrent maxillary sinusitis   • Hypertension   • Coronary arteriosclerosis   • Weight loss   • Bilious vomiting with nausea   • Gastroesophageal reflux disease   • Degenerative joint disease involving multiple joints   • Chronic pain of right knee   • Effusion of knee joint right   • Internal derangement of right knee   • Primary osteoarthritis of right knee   • Degenerative tear of posterior horn of medial meniscus, right   • Mitral valve sclerosis   • Aortic valve sclerosis   • Unstable angina (HCC)   • Slow heart rate   • Laceration of thumb   • H/O heart artery stent   • Fall at home   • Dizziness   • Contusion, hip and thigh   • Constipation   • Benign prostatic hyperplasia without urinary obstruction   • Hypertension   • Nausea & vomiting     Advance Care Planning  Advance Directive is not on file.  ACP discussion was held with the patient during this visit. Patient  "does not have an advance directive, declines further assistance.          Objective    Vitals:    04/13/22 1107   BP: 150/80   Pulse: 67   SpO2: 96%   Weight: 67.1 kg (148 lb)   Height: 177.8 cm (70\")   PainSc: 0-No pain     BMI Readings from Last 1 Encounters:   04/13/22 21.24 kg/m²   BMI is within normal parameters. No follow-up required.    Does the patient have evidence of cognitive impairment? No    Physical Exam            HEALTH RISK ASSESSMENT    Smoking Status:  Social History     Tobacco Use   Smoking Status Former Smoker   Smokeless Tobacco Never Used     Alcohol Consumption:  Social History     Substance and Sexual Activity   Alcohol Use No     Fall Risk Screen:    STEADI Fall Risk Assessment was completed, and patient is at LOW risk for falls.Assessment completed on:4/13/2022    Depression Screening:  PHQ-2/PHQ-9 Depression Screening 4/13/2022   Retired Total Score -   Little Interest or Pleasure in Doing Things 0-->not at all   Feeling Down, Depressed or Hopeless 0-->not at all   PHQ-9: Brief Depression Severity Measure Score 0       Health Habits and Functional and Cognitive Screening:  Functional & Cognitive Status 4/13/2022   Do you have difficulty preparing food and eating? No   Do you have difficulty bathing yourself, getting dressed or grooming yourself? No   Do you have difficulty using the toilet? No   Do you have difficulty moving around from place to place? No   Do you have trouble with steps or getting out of a bed or a chair? No   Current Diet Well Balanced Diet   Dental Exam Up to date   Eye Exam Up to date   Exercise (times per week) 7 times per week   Current Exercises Include Walking   Current Exercise Activities Include -   Do you need help using the phone?  No   Are you deaf or do you have serious difficulty hearing?  No   Do you need help with transportation? No   Do you need help shopping? No   Do you need help preparing meals?  No   Do you need help with housework?  No   Do you " need help with laundry? No   Do you need help taking your medications? No   Do you need help managing money? No   Do you ever drive or ride in a car without wearing a seat belt? No   Have you felt unusual stress, anger or loneliness in the last month? No   Who do you live with? Alone   If you need help, do you have trouble finding someone available to you? Yes   Have you been bothered in the last four weeks by sexual problems? No   Do you have difficulty concentrating, remembering or making decisions? No       Age-appropriate Screening Schedule:  Refer to the list below for future screening recommendations based on patient's age, sex and/or medical conditions. Orders for these recommended tests are listed in the plan section. The patient has been provided with a written plan.    Health Maintenance   Topic Date Due   • ZOSTER VACCINE (2 of 2) 04/13/2022 (Originally 4/17/2018)   • TDAP/TD VACCINES (1 - Tdap) 04/13/2023 (Originally 9/16/1959)   • INFLUENZA VACCINE  08/01/2022   • LIPID PANEL  12/22/2022              Assessment/Plan   CMS Preventative Services Quick Reference  Risk Factors Identified During Encounter  Cardiovascular Disease  Fall Risk-High or Moderate  Hearing Problem  Immunizations Discussed/Encouraged (specific Immunizations; Tdap, Pneumococcal 23 and Shingrix  Polypharmacy  The above risks/problems have been discussed with the patient.  Follow up actions/plans if indicated are seen below in the Assessment/Plan Section.  Pertinent information has been shared with the patient in the After Visit Summary.    Diagnoses and all orders for this visit:    1. Medicare annual wellness visit, subsequent (Primary)    2. Elevated prostate specific antigen (PSA)  -     Ambulatory Referral to Urology    3. Encounter for screening for malignant neoplasm of colon  -     Ambulatory Referral For Screening Colonoscopy    4. Need for hepatitis C screening test  -     Hepatitis C Antibody; Future        Follow Up:    Return if symptoms worsen or fail to improve, for Recheck, or sooner as needed.     An After Visit Summary and PPPS were made available to the patient.        I spent 33 minutes caring for Steve on this date of service. This time includes time spent by me in the following activities:preparing for the visit, reviewing tests, obtaining and/or reviewing a separately obtained history, performing a medically appropriate examination and/or evaluation , counseling and educating the patient/family/caregiver, ordering medications, tests, or procedures, referring and communicating with other health care professionals  and documenting information in the medical record

## 2022-04-18 ENCOUNTER — OFFICE VISIT (OUTPATIENT)
Dept: SURGERY | Facility: CLINIC | Age: 82
End: 2022-04-18

## 2022-04-18 VITALS
HEIGHT: 70 IN | DIASTOLIC BLOOD PRESSURE: 84 MMHG | WEIGHT: 150 LBS | SYSTOLIC BLOOD PRESSURE: 152 MMHG | BODY MASS INDEX: 21.47 KG/M2 | HEART RATE: 65 BPM | TEMPERATURE: 96.8 F

## 2022-04-18 DIAGNOSIS — Z03.89 ENCOUNTER FOR OBSERVATION FOR OTHER SUSPECTED DISEASES AND CONDITIONS RULED OUT: ICD-10-CM

## 2022-04-18 DIAGNOSIS — Z80.0 FAMILY HX OF COLON CANCER: Primary | ICD-10-CM

## 2022-04-18 PROCEDURE — S0260 H&P FOR SURGERY: HCPCS | Performed by: SURGERY

## 2022-04-18 RX ORDER — DEXTROSE AND SODIUM CHLORIDE 5; .45 G/100ML; G/100ML
100 INJECTION, SOLUTION INTRAVENOUS CONTINUOUS PRN
Status: CANCELLED | OUTPATIENT
Start: 2022-05-06

## 2022-04-18 NOTE — PROGRESS NOTES
Subjective   Steve Bethea is a 81 y.o. male     Chief Complaint: Cancer screening    History of Present Illness referred by VONDA Dsouza for cancer screening.  Patient does not remember the last time he had a colonoscopy but its been probably 20+ years.  Said his father had colon cancer.  He denies any specific symptoms related to his colon specifically denies any abdominal pain or any bleeding.  He does take Plavix for cardiac reasons.  Last stents were placed in years ago    Review of Systems   Constitutional: Positive for appetite change.   HENT: Positive for hearing loss.    Eyes: Negative.    Respiratory: Negative.    Cardiovascular: Negative.    Gastrointestinal: Negative.    Endocrine: Negative.    Genitourinary: Positive for frequency.   Musculoskeletal: Negative.    Skin: Negative.    Allergic/Immunologic: Negative.    Neurological: Positive for numbness.   Hematological: Negative.    Psychiatric/Behavioral: Negative.      Past Medical History:   Diagnosis Date   • Coronary arteriosclerosis    • Degenerative joint disease involving multiple joints    • Hypertension    • Plantar fasciitis    • Sleep apnea    • Upper respiratory infection      Past Surgical History:   Procedure Laterality Date   • ANGIOPLASTY  12/17/2012    x 1 stent   • COLONOSCOPY     • ENDOSCOPY N/A 5/11/2018    Procedure: ESOPHAGOGASTRODUODENOSCOPY possible dilation;  Surgeon: Praneeth Ignacio MD;  Location: [facility] ENDOSCOPY;  Service: Gastroenterology   • EYE SURGERY     • INJECTION OF MEDICATION  04/03/2013    Inj(s) Tend-Sheath, Ligament, Single 05541 (1)       Family History   Problem Relation Age of Onset   • Alzheimer's disease Mother    • Hypertension Mother    • Colon cancer Father    • Hypertension Father    • Stroke Other         uncle   • Heart disease Other    • Cancer Brother      Social History     Socioeconomic History   • Marital status: Single   Tobacco Use   • Smoking status: Former Smoker   • Smokeless tobacco: Never  Used   Vaping Use   • Vaping Use: Never used   Substance and Sexual Activity   • Alcohol use: No   • Drug use: No   • Sexual activity: Defer     No Known Allergies  Vitals:    04/18/22 1235   BP: 152/84   Pulse: 65   Temp: 96.8 °F (36 °C)       Home Medications:  Prior to Admission medications    Medication Sig Start Date End Date Taking? Authorizing Provider   aspirin 81 MG EC tablet Take 81 mg by mouth Daily.   Yes Rei Brower MD   atorvastatin (LIPITOR) 80 MG tablet Take 80 mg by mouth.   Yes Rei Brower MD   Cholecalciferol 2000 UNITS tablet Take 2,000 Units by mouth.   Yes Rei Brower MD   clopidogrel (PLAVIX) 75 MG tablet Take 75 mg by mouth.   Yes Rei Brower MD   enalapril (VASOTEC) 20 MG tablet Take 20 mg by mouth.   Yes Rei Brower MD   Flaxseed, Linseed, (FLAXSEED OIL PO) Take  by mouth.   Yes Rei Browre MD   hydroCHLOROthiazide (MICROZIDE) 12.5 MG capsule Take 1 capsule by mouth Daily. 11/19/21  Yes Rei Brower MD   Magnesium 250 MG tablet Take  by mouth.   Yes Rei Brower MD   nitroglycerin (NITROSTAT) 0.4 MG SL tablet Place 0.4 mg under the tongue Every 5 (Five) Minutes.   Yes Rei Brower MD   Omega-3 Fatty Acids (FISH OIL PO) Take 1 capsule by mouth.   Yes Rei Brower MD   polyethylene glycol (MIRALAX) 17 g packet Miralax   Yes Rei Brower MD   Ascorbic Acid (Vitamin C) 500 MG capsule Take  by mouth.  4/18/22  Rei Brower MD   traZODone (DESYREL) 50 MG tablet Take 1 tablet by mouth Every Night. 12/21/20 4/18/22  Maria Teresa Torres APRN       Objective   Physical Exam  Constitutional:       General: He is not in acute distress.     Appearance: He is well-developed.   HENT:      Head: Normocephalic and atraumatic.   Eyes:      General: No scleral icterus.     Conjunctiva/sclera: Conjunctivae normal.   Neck:      Thyroid: No thyromegaly.      Trachea: No tracheal deviation.    Cardiovascular:      Rate and Rhythm: Normal rate and regular rhythm.      Heart sounds: Normal heart sounds. No murmur heard.    No friction rub. No gallop.   Pulmonary:      Effort: Pulmonary effort is normal. No respiratory distress.      Breath sounds: Normal breath sounds. No stridor. No wheezing or rales.   Chest:      Chest wall: No tenderness.   Abdominal:      General: Bowel sounds are normal. There is no distension.      Palpations: Abdomen is soft. There is no mass.      Tenderness: There is no abdominal tenderness. There is no guarding or rebound.      Hernia: No hernia is present.   Musculoskeletal:         General: No deformity. Normal range of motion.      Cervical back: Normal range of motion and neck supple.   Lymphadenopathy:      Cervical: No cervical adenopathy.   Skin:     General: Skin is warm and dry.      Coloration: Skin is not pale.      Findings: No erythema or rash.      Nails: There is no clubbing.   Neurological:      Mental Status: He is alert and oriented to person, place, and time.   Psychiatric:         Behavior: Behavior normal.         Thought Content: Thought content normal.         Assessment/Plan Recommend screening colonoscopy.  Fully discussed the procedure alternatives risk and benefits with them as well as the prep and the importance of the prep.  He clearly understands and wishes to proceed      There were no encounter diagnoses.                     This document has been electronically signed by Layla Kenny CSA on April 18, 2022 12:44 CDT

## 2022-04-22 PROBLEM — Z03.89 ENCOUNTER FOR OBSERVATION FOR OTHER SUSPECTED DISEASES AND CONDITIONS RULED OUT: Status: ACTIVE | Noted: 2022-04-22

## 2022-05-06 ENCOUNTER — ANESTHESIA EVENT (OUTPATIENT)
Dept: GASTROENTEROLOGY | Facility: HOSPITAL | Age: 82
End: 2022-05-06

## 2022-05-06 ENCOUNTER — HOSPITAL ENCOUNTER (OUTPATIENT)
Facility: HOSPITAL | Age: 82
Setting detail: HOSPITAL OUTPATIENT SURGERY
Discharge: HOME | End: 2022-05-06
Attending: SURGERY | Admitting: SURGERY

## 2022-05-06 ENCOUNTER — ANESTHESIA (OUTPATIENT)
Dept: GASTROENTEROLOGY | Facility: HOSPITAL | Age: 82
End: 2022-05-06

## 2022-05-06 VITALS
SYSTOLIC BLOOD PRESSURE: 96 MMHG | BODY MASS INDEX: 19.76 KG/M2 | WEIGHT: 138 LBS | HEART RATE: 66 BPM | TEMPERATURE: 97.2 F | HEIGHT: 70 IN | OXYGEN SATURATION: 95 % | RESPIRATION RATE: 14 BRPM | DIASTOLIC BLOOD PRESSURE: 57 MMHG

## 2022-05-06 DIAGNOSIS — Z80.0 FAMILY HX OF COLON CANCER: ICD-10-CM

## 2022-05-06 PROCEDURE — 25010000002 PROPOFOL 10 MG/ML EMULSION: Performed by: NURSE ANESTHETIST, CERTIFIED REGISTERED

## 2022-05-06 PROCEDURE — 25010000002 ATROPINE PER 0.01 MG: Performed by: NURSE ANESTHETIST, CERTIFIED REGISTERED

## 2022-05-06 RX ORDER — ATROPINE SULFATE 1 MG/ML
INJECTION, SOLUTION INTRAMUSCULAR; INTRAVENOUS; SUBCUTANEOUS AS NEEDED
Status: DISCONTINUED | OUTPATIENT
Start: 2022-05-06 | End: 2022-05-06 | Stop reason: SURG

## 2022-05-06 RX ORDER — PROPOFOL 10 MG/ML
VIAL (ML) INTRAVENOUS AS NEEDED
Status: DISCONTINUED | OUTPATIENT
Start: 2022-05-06 | End: 2022-05-06 | Stop reason: SURG

## 2022-05-06 RX ORDER — ONDANSETRON 2 MG/ML
4 INJECTION INTRAMUSCULAR; INTRAVENOUS ONCE AS NEEDED
Status: DISCONTINUED | OUTPATIENT
Start: 2022-05-06 | End: 2022-05-06 | Stop reason: HOSPADM

## 2022-05-06 RX ORDER — LIDOCAINE HYDROCHLORIDE 20 MG/ML
INJECTION, SOLUTION INTRAVENOUS AS NEEDED
Status: DISCONTINUED | OUTPATIENT
Start: 2022-05-06 | End: 2022-05-06 | Stop reason: SURG

## 2022-05-06 RX ORDER — DEXTROSE AND SODIUM CHLORIDE 5; .45 G/100ML; G/100ML
100 INJECTION, SOLUTION INTRAVENOUS CONTINUOUS PRN
Status: DISCONTINUED | OUTPATIENT
Start: 2022-05-06 | End: 2022-05-06 | Stop reason: HOSPADM

## 2022-05-06 RX ADMIN — PROPOFOL 70 MG: 10 INJECTION, EMULSION INTRAVENOUS at 09:17

## 2022-05-06 RX ADMIN — PROPOFOL 20 MG: 10 INJECTION, EMULSION INTRAVENOUS at 09:28

## 2022-05-06 RX ADMIN — PROPOFOL 40 MG: 10 INJECTION, EMULSION INTRAVENOUS at 09:23

## 2022-05-06 RX ADMIN — PROPOFOL 30 MG: 10 INJECTION, EMULSION INTRAVENOUS at 09:26

## 2022-05-06 RX ADMIN — PROPOFOL 20 MG: 10 INJECTION, EMULSION INTRAVENOUS at 09:19

## 2022-05-06 RX ADMIN — PROPOFOL 20 MG: 10 INJECTION, EMULSION INTRAVENOUS at 09:30

## 2022-05-06 RX ADMIN — LIDOCAINE HYDROCHLORIDE 40 MG: 20 INJECTION, SOLUTION INTRAVENOUS at 09:17

## 2022-05-06 RX ADMIN — DEXTROSE AND SODIUM CHLORIDE 100 ML/HR: 5; 450 INJECTION, SOLUTION INTRAVENOUS at 08:10

## 2022-05-06 RX ADMIN — ATROPINE SULFATE 0.5 MG: 1 INJECTION, SOLUTION INTRAMUSCULAR; INTRAVENOUS; SUBCUTANEOUS at 09:24

## 2022-05-06 RX ADMIN — PROPOFOL 20 MG: 10 INJECTION, EMULSION INTRAVENOUS at 09:32

## 2022-05-06 RX ADMIN — GLYCOPYRROLATE 0.2 MG: 0.2 INJECTION, SOLUTION INTRAMUSCULAR; INTRAVITREAL at 09:22

## 2022-05-06 NOTE — ANESTHESIA POSTPROCEDURE EVALUATION
Patient: Steve Bethea    Procedure Summary     Date: 05/06/22 Room / Location: [facility] ENDOSCOPY 2 / [facility] ENDOSCOPY    Anesthesia Start: 0913 Anesthesia Stop: 0934    Procedure: COLONOSCOPY (N/A ) Diagnosis:       Encounter for observation for other suspected diseases and conditions ruled out      Family hx of colon cancer      (Encounter for observation for other suspected diseases and conditions ruled out [Z03.89])      (Family hx of colon cancer [Z80.0])    Surgeons: Adam Monaco MD Provider: Elsie Vance CRNA    Anesthesia Type: MAC ASA Status: 3          Anesthesia Type: MAC    Vitals  No vitals data found for the desired time range.          Post Anesthesia Care and Evaluation    Patient location during evaluation: bedside  Patient participation: complete - patient participated  Level of consciousness: sleepy but conscious  Pain score: 0  Pain management: adequate  Airway patency: patent  Anesthetic complications: No anesthetic complications  PONV Status: none  Cardiovascular status: hemodynamically stable  Respiratory status: acceptable and room air  Hydration status: acceptable

## 2022-05-06 NOTE — ANESTHESIA PREPROCEDURE EVALUATION
Anesthesia Evaluation     Patient summary reviewed and Nursing notes reviewed   no history of anesthetic complications:  NPO Solid Status: > 8 hours  NPO Liquid Status: > 2 hours           Airway   Mallampati: II  TM distance: >3 FB  Neck ROM: full  Possible difficult intubation  Dental    (+) poor dentition    Pulmonary     breath sounds clear to auscultation  (+) sleep apnea,   Cardiovascular     PT is on anticoagulation therapy  Rate: normal    (+) hypertension 2 medications or greater, CAD, cardiac stents (x3) more than 12 months ago angina with exertion, hyperlipidemia,       Neuro/Psych  (+) dizziness/light headedness,    GI/Hepatic/Renal/Endo    (+)  GERD,      Musculoskeletal     Abdominal  - normal exam   Substance History - negative use     OB/GYN          Other   arthritis,                    Anesthesia Plan    ASA 3     MAC     intravenous induction     Anesthetic plan, all risks, benefits, and alternatives have been provided, discussed and informed consent has been obtained with: patient.        CODE STATUS:

## 2022-05-06 NOTE — H&P
No chief complaint on file.      Steve Bethea is a 81 y.o. male referred today for evaluation for colonoscopy.  He notes no change in bowel habits, no blood in the stool.     Prior Colonoscopy:yes, 15 yrs ago in [place], reports no findings  Prior Polyps:no  Family History of Colon Cancer:yes, father had colon ca  On anticoagulation:yes,, off plavix one week    Past Surgical History:   Procedure Laterality Date   • ANGIOPLASTY  12/17/2012    x 1 stent   • CARDIAC CATHETERIZATION     • COLONOSCOPY     • ENDOSCOPY N/A 05/11/2018    Procedure: ESOPHAGOGASTRODUODENOSCOPY possible dilation;  Surgeon: Praneeth Ignacio MD;  Location: [facility] ENDOSCOPY;  Service: Gastroenterology   • EYE SURGERY Bilateral     cataract   • INJECTION OF MEDICATION  04/03/2013    Inj(s) Tend-Sheath, Ligament, Single 10546 (1)       Past Medical History:   Diagnosis Date   • Coronary arteriosclerosis     3 stents, followed by Dr. Pond   • Degenerative joint disease involving multiple joints    • Elevated cholesterol    • Hypertension    • Plantar fasciitis    • Sleep apnea    • Upper respiratory infection      Social History     Socioeconomic History   • Marital status: Single   Tobacco Use   • Smoking status: Former Smoker   • Smokeless tobacco: Never Used   Vaping Use   • Vaping Use: Never used   Substance and Sexual Activity   • Alcohol use: No   • Drug use: No   • Sexual activity: Defer     Family History   Problem Relation Age of Onset   • Alzheimer's disease Mother    • Hypertension Mother    • Colon cancer Father    • Hypertension Father    • Stroke Other         uncle   • Heart disease Other    • Cancer Brother      No Known Allergies    Home Medications:  Prior to Admission medications    Medication Sig Start Date End Date Taking? Authorizing Provider   atorvastatin (LIPITOR) 80 MG tablet Take 80 mg by mouth Every Night.   Yes Provider, MD Rie   Cholecalciferol 2000 UNITS tablet Take 2,000 Units by mouth Daily.   Yes  Rei Brower MD   enalapril (VASOTEC) 20 MG tablet Take 20 mg by mouth Daily.   Yes Rei Brower MD   Flaxseed, Linseed, (FLAXSEED OIL PO) Take 1 tablet by mouth Daily.   Yes Rie Brower MD   hydroCHLOROthiazide (MICROZIDE) 12.5 MG capsule Take 1 capsule by mouth Daily. 11/19/21  Yes Rei Brower MD   Magnesium 250 MG tablet Take 250 mg by mouth Daily.   Yes Rei Brower MD   Omega-3 Fatty Acids (FISH OIL PO) Take 1 capsule by mouth Daily.   Yes Rei Brower MD   aspirin 81 MG EC tablet Take 81 mg by mouth Daily.    Rei Brower MD   clopidogrel (PLAVIX) 75 MG tablet Take 75 mg by mouth Daily.    Rei Brower MD   nitroglycerin (NITROSTAT) 0.4 MG SL tablet Place 0.4 mg under the tongue Every 5 (Five) Minutes As Needed.    Rei Brower MD       Review of Systems   Constitutional: Negative.    HENT: Negative for hearing loss, nosebleeds and trouble swallowing.    Respiratory: Negative for apnea, chest tightness and shortness of breath.    Cardiovascular: Negative for chest pain and palpitations.   Gastrointestinal: Negative for abdominal distention, abdominal pain, blood in stool, constipation, diarrhea, nausea and vomiting.   Genitourinary: Negative for difficulty urinating, dysuria, frequency and urgency.   Musculoskeletal: Negative for back pain, joint swelling and neck pain.   Skin: Negative for rash.   Neurological: Negative for dizziness, seizures, weakness, light-headedness, numbness and headaches.   Hematological: Negative for adenopathy.   Psychiatric/Behavioral: Negative for agitation. The patient is not nervous/anxious.        Vitals:    05/06/22 0758   BP: 144/76   Pulse: 54   Resp: 18   Temp: 97.1 °F (36.2 °C)   SpO2: 96%       Physical Exam  Constitutional:       General: He is not in acute distress.     Appearance: He is well-developed.   HENT:      Head: Normocephalic and atraumatic.   Eyes:      General: No scleral  icterus.     Conjunctiva/sclera: Conjunctivae normal.   Neck:      Thyroid: No thyromegaly.      Trachea: No tracheal deviation.   Cardiovascular:      Rate and Rhythm: Normal rate and regular rhythm.      Heart sounds: Normal heart sounds. No murmur heard.    No friction rub. No gallop.   Pulmonary:      Effort: Pulmonary effort is normal. No respiratory distress.      Breath sounds: Normal breath sounds. No stridor. No wheezing or rales.   Chest:      Chest wall: No tenderness.   Abdominal:      General: Bowel sounds are normal. There is no distension.      Palpations: Abdomen is soft. There is no mass.      Tenderness: There is no abdominal tenderness. There is no guarding or rebound.      Hernia: No hernia is present.   Musculoskeletal:         General: No deformity. Normal range of motion.      Cervical back: Normal range of motion and neck supple.   Lymphadenopathy:      Cervical: No cervical adenopathy.   Skin:     General: Skin is warm and dry.      Coloration: Skin is not pale.      Findings: No erythema or rash.      Nails: There is no clubbing.   Neurological:      Mental Status: He is alert and oriented to person, place, and time.   Psychiatric:         Behavior: Behavior normal.         Thought Content: Thought content normal.         Assessment     In need of screening colonoscopy.    Plan     Risks, benefits, rationale and prep for colonoscopy have been discussed with the patient.  The patient indicates understanding of these issues and agrees with the plan.

## 2022-05-11 ENCOUNTER — LAB (OUTPATIENT)
Dept: LAB | Facility: OTHER | Age: 82
End: 2022-05-11

## 2022-05-11 ENCOUNTER — OFFICE VISIT (OUTPATIENT)
Dept: FAMILY MEDICINE CLINIC | Facility: CLINIC | Age: 82
End: 2022-05-11

## 2022-05-11 VITALS
BODY MASS INDEX: 20.62 KG/M2 | HEIGHT: 70 IN | OXYGEN SATURATION: 96 % | HEART RATE: 54 BPM | WEIGHT: 144 LBS | SYSTOLIC BLOOD PRESSURE: 100 MMHG | DIASTOLIC BLOOD PRESSURE: 60 MMHG

## 2022-05-11 DIAGNOSIS — R63.4 UNINTENTIONAL WEIGHT LOSS: Primary | ICD-10-CM

## 2022-05-11 DIAGNOSIS — Z11.59 NEED FOR HEPATITIS C SCREENING TEST: ICD-10-CM

## 2022-05-11 DIAGNOSIS — I35.8 AORTIC VALVE SCLEROSIS: ICD-10-CM

## 2022-05-11 DIAGNOSIS — R63.4 UNINTENTIONAL WEIGHT LOSS: ICD-10-CM

## 2022-05-11 DIAGNOSIS — R00.1 BRADYCARDIA: ICD-10-CM

## 2022-05-11 DIAGNOSIS — N40.0 BENIGN PROSTATIC HYPERPLASIA WITHOUT URINARY OBSTRUCTION: ICD-10-CM

## 2022-05-11 DIAGNOSIS — I05.8 MITRAL VALVE SCLEROSIS: ICD-10-CM

## 2022-05-11 DIAGNOSIS — I10 ESSENTIAL HYPERTENSION: Chronic | ICD-10-CM

## 2022-05-11 LAB
ALBUMIN SERPL-MCNC: 4 G/DL (ref 3.5–5)
ALBUMIN/GLOB SERPL: 1.4 G/DL (ref 1.1–1.8)
ALP SERPL-CCNC: 89 U/L (ref 38–126)
ALT SERPL W P-5'-P-CCNC: 18 U/L
ANION GAP SERPL CALCULATED.3IONS-SCNC: 6 MMOL/L (ref 5–15)
AST SERPL-CCNC: 25 U/L (ref 17–59)
BASOPHILS # BLD AUTO: 0.03 10*3/MM3 (ref 0–0.2)
BASOPHILS NFR BLD AUTO: 0.3 % (ref 0–1.5)
BILIRUB SERPL-MCNC: 0.6 MG/DL (ref 0.2–1.3)
BUN SERPL-MCNC: 12 MG/DL (ref 7–23)
BUN/CREAT SERPL: 16.4 (ref 7–25)
CALCIUM SPEC-SCNC: 9.2 MG/DL (ref 8.4–10.2)
CHLORIDE SERPL-SCNC: 101 MMOL/L (ref 101–112)
CO2 SERPL-SCNC: 31 MMOL/L (ref 22–30)
CREAT SERPL-MCNC: 0.73 MG/DL (ref 0.7–1.3)
DEPRECATED RDW RBC AUTO: 43.4 FL (ref 37–54)
EGFRCR SERPLBLD CKD-EPI 2021: 92.5 ML/MIN/1.73
EOSINOPHIL # BLD AUTO: 0.05 10*3/MM3 (ref 0–0.4)
EOSINOPHIL NFR BLD AUTO: 0.5 % (ref 0.3–6.2)
ERYTHROCYTE [DISTWIDTH] IN BLOOD BY AUTOMATED COUNT: 12.8 % (ref 12.3–15.4)
GLOBULIN UR ELPH-MCNC: 2.8 GM/DL (ref 2.3–3.5)
GLUCOSE SERPL-MCNC: 100 MG/DL (ref 70–99)
HCT VFR BLD AUTO: 44 % (ref 37.5–51)
HGB BLD-MCNC: 14.9 G/DL (ref 13–17.7)
LYMPHOCYTES # BLD AUTO: 2.11 10*3/MM3 (ref 0.7–3.1)
LYMPHOCYTES NFR BLD AUTO: 22 % (ref 19.6–45.3)
MCH RBC QN AUTO: 32.2 PG (ref 26.6–33)
MCHC RBC AUTO-ENTMCNC: 33.9 G/DL (ref 31.5–35.7)
MCV RBC AUTO: 95 FL (ref 79–97)
MONOCYTES # BLD AUTO: 0.57 10*3/MM3 (ref 0.1–0.9)
MONOCYTES NFR BLD AUTO: 6 % (ref 5–12)
NEUTROPHILS NFR BLD AUTO: 6.81 10*3/MM3 (ref 1.7–7)
NEUTROPHILS NFR BLD AUTO: 71.2 % (ref 42.7–76)
PLATELET # BLD AUTO: 378 10*3/MM3 (ref 140–450)
PMV BLD AUTO: 10.1 FL (ref 6–12)
POTASSIUM SERPL-SCNC: 3.9 MMOL/L (ref 3.4–5)
PROT SERPL-MCNC: 6.8 G/DL (ref 6.3–8.6)
RBC # BLD AUTO: 4.63 10*6/MM3 (ref 4.14–5.8)
SODIUM SERPL-SCNC: 138 MMOL/L (ref 137–145)
WBC NRBC COR # BLD: 9.57 10*3/MM3 (ref 3.4–10.8)

## 2022-05-11 PROCEDURE — 86803 HEPATITIS C AB TEST: CPT | Performed by: NURSE PRACTITIONER

## 2022-05-11 PROCEDURE — 99214 OFFICE O/P EST MOD 30 MIN: CPT | Performed by: NURSE PRACTITIONER

## 2022-05-11 PROCEDURE — 84443 ASSAY THYROID STIM HORMONE: CPT | Performed by: NURSE PRACTITIONER

## 2022-05-11 PROCEDURE — 85025 COMPLETE CBC W/AUTO DIFF WBC: CPT | Performed by: NURSE PRACTITIONER

## 2022-05-11 PROCEDURE — 84439 ASSAY OF FREE THYROXINE: CPT | Performed by: NURSE PRACTITIONER

## 2022-05-11 PROCEDURE — 36415 COLL VENOUS BLD VENIPUNCTURE: CPT | Performed by: NURSE PRACTITIONER

## 2022-05-11 PROCEDURE — 80053 COMPREHEN METABOLIC PANEL: CPT | Performed by: NURSE PRACTITIONER

## 2022-05-11 NOTE — PROGRESS NOTES
Chief Complaint  Weight Loss (Losing weight and dont know why. Also wants to discuss a new doc for prostate)    Subjective          Steve Bethea presents to [facility]  History of Present Illness     The patient is a 81-year-old male who presents to the clinic for excessive weight loss. The patient an initial weight of 180 lbs, which has now decreased to 140 lbs; however his medical records indicate that he was 138 lbs from his visit with Dr. Monaco in May 2021. Today his weight is 144lb. The patient was unable to specify the time frame in which he has lost all this weight. He states that he currently lives alone and enjoys a diet primarily of soup, vegetables, sandwiches, cheese, and Ensure. He states that he no longer cooks like he used to. He notes that his appetite was not as good compared to previous years.    The patient reports intermittent right temporal head pain.. He reports being seen in the ED a year and a half ago for the same complaint wherein an MRI was eventually conducted. He states that there were no significant findings.     The patient reports that his heart occasionally skips a beat. He notes a history of 3 cardiac stents. He also notes that he had an echocardiogram done with his cardiologist, Dr. Pond, 1 to 2 weeks ago, which he states showed no abnormal findings.    The patient states that he is currently taking Dulcolax. He states that his constipation has improved. He denies the presence of blood, mucus in his stools.    The patient emphasizes that he has an enlarged prostate; however, he was unable to answer questions regarding who and when the diagnosis was given. He denies any urinary symptoms aside from having to urinate a little more frequently at night. Requesting referral to closer / new urologist.     The following portions of the patient's history were reviewed and updated as appropriate: allergies, current medications, past  "family history, past medical history, past social history, past surgical history and problem list.    Objective   Vital Signs:  /60   Pulse 54   Ht 177.8 cm (70\")   Wt 65.3 kg (144 lb)   SpO2 96%   BMI 20.66 kg/m²     BMI is within normal parameters. No follow-up required.      Physical Exam  Vitals and nursing note reviewed.   Constitutional:       General: He is not in acute distress.     Appearance: Normal appearance. He is not ill-appearing, toxic-appearing or diaphoretic.   HENT:      Head: Normocephalic and atraumatic.   Cardiovascular:      Rate and Rhythm: Regular rhythm. Bradycardia present.      Heart sounds: Normal heart sounds. No murmur heard.    No friction rub. No gallop.      Comments: Heart rate bradycardic, but regular.  Pulmonary:      Effort: Pulmonary effort is normal. No respiratory distress.      Breath sounds: Normal breath sounds. No stridor. No wheezing, rhonchi or rales.      Comments: Lung fields clear, but diminished throughout.   Abdominal:      General: Abdomen is flat. Bowel sounds are normal. There is no distension.      Palpations: Abdomen is soft. There is no mass.      Tenderness: There is no abdominal tenderness. There is no guarding.      Hernia: No hernia is present.      Comments: Abdomen is soft, flat, nontender to palpation throughout. No masses palpated. Bowel sounds positive.   Musculoskeletal:      Comments: Lower extremities are without any edema.   Skin:     General: Skin is warm and dry.      Coloration: Skin is not jaundiced or pale.      Findings: No bruising, erythema, lesion or rash.   Neurological:      Mental Status: He is alert and oriented to person, place, and time.   Psychiatric:         Mood and Affect: Mood normal.         Behavior: Behavior normal.         Thought Content: Thought content normal.         Judgment: Judgment normal.        Result Review :     Common labs    Common Labsle 8/2/21 2/7/22 5/11/22 5/11/22      1416 1416   Glucose    " 100 (A)   BUN    12   Creatinine    0.73   Sodium    138   Potassium    3.9   Chloride    101   Calcium    9.2   Albumin    4.00   Total Bilirubin    0.6   Alkaline Phosphatase    89   AST (SGOT)    25   ALT (SGPT)    18   WBC   9.57    Hemoglobin   14.9    Hematocrit   44.0    Platelets   378    PSA 3.750 4.670 (A)     (A) Abnormal value            CMP    CMP 5/11/22   Glucose 100 (A)   BUN 12   Creatinine 0.73   Sodium 138   Potassium 3.9   Chloride 101   Calcium 9.2   Albumin 4.00   Total Bilirubin 0.6   Alkaline Phosphatase 89   AST (SGOT) 25   ALT (SGPT) 18   (A) Abnormal value            CBC    CBC 5/11/22   WBC 9.57   RBC 4.63   Hemoglobin 14.9   Hematocrit 44.0   MCV 95.0   MCH 32.2   MCHC 33.9   RDW 12.8   Platelets 378           CBC w/diff    CBC w/Diff 5/11/22   WBC 9.57   RBC 4.63   Hemoglobin 14.9   Hematocrit 44.0   MCV 95.0   MCH 32.2   MCHC 33.9   RDW 12.8   Platelets 378   Neutrophil Rel % 71.2   Lymphocyte Rel % 22.0   Monocyte Rel % 6.0   Eosinophil Rel % 0.5   Basophil Rel % 0.3               TSH    TSH 5/11/22   TSH 1.360           PSA    PSA 7/15/21 8/2/21 2/7/22   PSA 4.340 (A) 3.750 4.670 (A)   (A) Abnormal value                 ECG 12 Lead    Date/Time: 5/12/2022 2:36 PM  Performed by: Maria Teresa Torres APRN  Authorized by: Maria Teresa Torres APRN   Comparison: not compared with previous ECG   Rhythm: sinus bradycardia  Rate: bradycardic  Conduction: 1st degree AV block  ST Segments: ST segments normal  QRS axis: normal    Clinical impression: abnormal EKG              Assessment and Plan    Diagnoses and all orders for this visit:    1. Unintentional weight loss (Primary)  -     CT Abdomen Pelvis With Contrast; Future  -     CBC & Differential; Future  -     Comprehensive metabolic panel; Future  -     TSH; Future  -     T4, free; Future  -     ECG 12 Lead    2. Bradycardia  -     ECG 12 Lead    3. Essential hypertension  -     ECG 12 Lead    4. Aortic valve sclerosis  -     ECG 12 Lead    5.  Mitral valve sclerosis  -     ECG 12 Lead    6. Benign prostatic hyperplasia without urinary obstruction  -     Ambulatory Referral to Urology    Patient has lost weight of approximately 6 lbs. unintentionally in the last 3 weeks, but he reports an excessive amount of weight loss up to 30 to 40 pounds in the last year. He recently had a colonoscopy with Dr. Monaco and was advised to repeat this in 5 years. Dad did die of colon cancer. I will obtain EKG in office today, CT abdomen and pelvis with contrast is ordered along with CBC, CMP, TSH, and free T4. He is asked to follow up here after he obtains these and this is scheduled for him while he is in the office today. If all unremarkable, we will consider MRI of head since he has had some intermittent right temporal pain, otherwise we will treat accordingly depending upon the CT results. He is advised to increase calorie count and per his request, he is referred on to new urologist, local urology per his request. Most recent PSA was slightly elevated at 4.7 in 02/2021. If his symptoms should persist or worsen prior to following up after CT, he will come back sooner. All questions and concerns are addressed with understanding noted. The patient is in agreement to above plan.       I spent 33 minutes caring for Steve on this date of service. This time includes time spent by me in the following activities:preparing for the visit, reviewing tests, performing a medically appropriate examination and/or evaluation , counseling and educating the patient/family/caregiver, ordering medications, tests, or procedures, referring and communicating with other health care professionals  and documenting information in the medical record  Follow Up   Return in about 4 weeks (around 6/8/2022), or if symptoms worsen or fail to improve, for Recheck, or sooner as needed.  Patient was given instructions and counseling regarding his condition or for health maintenance advice. Please see  specific information pulled into the AVS if appropriate.     Transcribed from ambient dictation for VONDA Stroud by Chase Haywood.  05/11/22   15:28 CDT    Patient verbalized consent to the visit recording.  I have personally performed the services described in this document as transcribed by the above individual, and it is both accurate and complete.  VONDA Stroud  5/12/2022  14:35 CDT

## 2022-05-12 LAB
HCV AB SER DONR QL: NORMAL
T4 FREE SERPL-MCNC: 0.88 NG/DL (ref 0.93–1.7)
TSH SERPL DL<=0.05 MIU/L-ACNC: 1.36 UIU/ML (ref 0.27–4.2)

## 2022-05-12 PROCEDURE — 93000 ELECTROCARDIOGRAM COMPLETE: CPT | Performed by: NURSE PRACTITIONER

## 2022-05-31 ENCOUNTER — OFFICE VISIT (OUTPATIENT)
Dept: FAMILY MEDICINE CLINIC | Facility: CLINIC | Age: 82
End: 2022-05-31

## 2022-05-31 VITALS
OXYGEN SATURATION: 96 % | WEIGHT: 147.4 LBS | DIASTOLIC BLOOD PRESSURE: 64 MMHG | TEMPERATURE: 98.1 F | HEART RATE: 53 BPM | HEIGHT: 70 IN | SYSTOLIC BLOOD PRESSURE: 122 MMHG | BODY MASS INDEX: 21.1 KG/M2

## 2022-05-31 DIAGNOSIS — S30.861A TICK BITE OF ABDOMINAL WALL, INITIAL ENCOUNTER: Primary | ICD-10-CM
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
## 2022-05-31 PROCEDURE — 99213 OFFICE O/P EST LOW 20 MIN: CPT | Performed by: NURSE PRACTITIONER

## 2022-05-31 NOTE — PROGRESS NOTES
"Chief Complaint  Tick Removal (2 ticks on right hip)    Subjective        Steve Bethea presents to [facility]  History of Present Illness     The patient presents today with complaints of a tick bite x2.    The patient reports two possible tick bites to the right side of the abdomen. He states that he was made aware of the bites today, however, it may have been there 1 to 2 days ago. He is really unsure of how long they have been present.  He denies any fever, chills, shortness of breath, nausea, vomiting, diarrhea, or neck pain. Weight stable, up 3 pounds from last visit here.     The following portions of the patient's history were reviewed and updated as appropriate: allergies, current medications, past family history, past medical history, past social history, past surgical history and problem list.      Objective   Vital Signs:  /64   Pulse 53   Temp 98.1 °F (36.7 °C)   Ht 177.8 cm (70\")   Wt 66.9 kg (147 lb 6.4 oz)   SpO2 96%   BMI 21.15 kg/m²     BMI is within normal parameters. No other follow-up for BMI required.      Physical Exam  Vitals and nursing note reviewed.   Constitutional:       General: He is not in acute distress.     Appearance: Normal appearance. He is normal weight. He is not ill-appearing, toxic-appearing or diaphoretic.   HENT:      Head: Normocephalic and atraumatic.   Cardiovascular:      Rate and Rhythm: Normal rate and regular rhythm.      Heart sounds: Normal heart sounds. No murmur heard.    No friction rub. No gallop.   Pulmonary:      Effort: Pulmonary effort is normal. No respiratory distress.      Breath sounds: Normal breath sounds. No stridor. No wheezing, rhonchi or rales.   Abdominal:      Comments: He has two small tics on the right lateral aspect of the abdominal wall. Mild surrounding erythema is noted. These tics were removed without difficulty.   Skin:     General: Skin is warm and dry.      Findings: " Erythema present.          Neurological:      Mental Status: He is alert and oriented to person, place, and time.   Psychiatric:         Mood and Affect: Mood normal.         Behavior: Behavior normal.         Thought Content: Thought content normal.         Judgment: Judgment normal.        Result Review :                Assessment and Plan   Diagnoses and all orders for this visit:    1. Tick bite of abdominal wall, initial encounter (Primary)  Comments:  x2    He is advised to monitor these areas closely as well as himself for any symptoms of fever, neck pain, joint pain. If he should develop any of these, he will let me know. Otherwise, I will see him back as scheduled for chronic conditions. He is unsure of how long these have been on and think they have not been attached very long. Therefore, he is not treated and he is also symptomatic and therefore not treated with doxycycline; however, again if he should develop any of the above problems or symptoms of tic disease, he will let me know. All questions and concerns are addressed with understanding noted. The patient is in agreement to above plan.    I spent 30 minutes caring for 22 on this date of service. This time includes time spent by me in the following activities: preparing for the visit, reviewing tests, performing a medically appropriate examination and/or evaluation, counseling and educating the patient/family/caregiver, ordering medications, tests, or procedures and documenting information in the medical record       I spent 22 minutes caring for Steve on this date of service. This time includes time spent by me in the following activities:preparing for the visit, performing a medically appropriate examination and/or evaluation , counseling and educating the patient/family/caregiver and documenting information in the medical record  Follow Up   Return if symptoms worsen or fail to improve, for Recheck, or sooner as needed.  Patient was given  instructions and counseling regarding his condition or for health maintenance advice. Please see specific information pulled into the AVS if appropriate.     Transcribed from ambient dictation for VONDA Stroud by YENI ROBLES.  05/31/22   15:35 CDT    Patient verbalized consent to the visit recording.  I have personally performed the services described in this document as transcribed by the above individual, and it is both accurate and complete.  VONDA Stroud  5/31/2022  16:06 CDT

## 2022-07-26 ENCOUNTER — OFFICE VISIT (OUTPATIENT)
Dept: FAMILY MEDICINE CLINIC | Facility: CLINIC | Age: 82
End: 2022-07-26

## 2022-07-26 VITALS — BODY MASS INDEX: 21.05 KG/M2 | HEIGHT: 70 IN | WEIGHT: 147 LBS

## 2022-07-26 DIAGNOSIS — U07.1 COVID-19: ICD-10-CM

## 2022-07-26 DIAGNOSIS — Z09 HOSPITAL DISCHARGE FOLLOW-UP: Primary | ICD-10-CM

## 2022-07-26 PROCEDURE — G2025 DIS SITE TELE SVCS RHC/FQHC: HCPCS | Performed by: NURSE PRACTITIONER

## 2022-07-27 NOTE — PROGRESS NOTES
"Chief Complaint  Exposure To Known Illness (Went to the ER due to shortness of breath. Tested positive for covid. Still having cough and shortness of breath)    Subjective        Steve Bethea presents to [facility] [place]  History of Present Illness  This was an audio and video enabled telemedicine encounter.    The patient presents today via telehealth for having tested COVID-19 positive on 07/18/2022 emergency room follow-up.    The patient is doing well overall, and notes making an improvement in his symptoms. The patient confirms he went to the emergency room on 07/18/2021 due to a fever and cough. He states his symptoms initially began on 07/16/2022, with a cough and elevated temperature. He states he was drinking orange juice, taking Tylenol, and staying hydrated. He states he does not have any device to check his temperature at home. The patient denies chills, nausea, vomiting, tiredness, headache, or sore throat. The patient reports he was provided prednisone in the emergency room and he took 4 pills. He states he has some cough syrup. The patient reports he has not had his vitamins C, D and zinc supplements filled yet. The patient reports he was advised at the emergency room, he was diagnosed with shortness of breath, cough, and fever. He states he has a \"cold\" and mild cough, but denies any shortness of breath currently. He states he has been eating soup, and increasing his vitamin C intake.     He reports that his blood pressure while he was in the emergency room was 126/69 mmHg, his oxygen saturation was 98 percent, his temperature was 98.7 degrees, and his respiration was 18 BPM.     Objective   Vital Signs:  Ht 177.8 cm (70\")   Wt 66.7 kg (147 lb)   BMI 21.09 kg/m²   Estimated body mass index is 21.09 kg/m² as calculated from the following:    Height as of this encounter: 177.8 cm (70\").    Weight as of this encounter: 66.7 kg (147 lb).    BMI is " within normal parameters. No other follow-up for BMI required.      Physical Exam   Deferred, telephone visit.    Result Review :      Data reviewed: Recent hospitalization notes [facility] records reviewed today           Assessment and Plan   Diagnoses and all orders for this visit:    1. Hospital discharge follow-up (Primary)    2. COVID-19    ER records are reviewed. He is advised to go ahead and start vitamin C, D, and zinc since he has not yet done so. He has already finished his round of prednisone. Continue quarantining per health department protocol. Return here or call back if his symptoms should persist or worsen. Otherwise, he should see that his symptoms continue to improve and improve until he is back to normal. He will follow-up as scheduled for chronic conditions  as needed otherwise.    All questions and concerns are addressed with understanding noted. The patient is in agreement to above plan.    This visit has been rescheduled as a phone visit to comply with patient safety concerns in accordance with CDC recommendations. Total time of discussion was 22 minutes.    You have chosen to receive care through a telephone visit. Do you consent to use a telephone visit for your medical care today? Yes       I spent 22 minutes caring for Steve on this date of service. This time includes time spent by me in the following activities:preparing for the visit, obtaining and/or reviewing a separately obtained history, counseling and educating the patient/family/caregiver and documenting information in the medical record  Follow Up   Return if symptoms worsen or fail to improve, for Recheck, or sooner as needed.  Patient was given instructions and counseling regarding his condition or for health maintenance advice. Please see specific information pulled into the AVS if appropriate.     Transcribed from ambient dictation for VONDA Stroud by Basia Adrian.  07/27/22   05:14 CDT    Patient verbalized consent to  the visit recording.  I have personally performed the services described in this document as transcribed by the above individual, and it is both accurate and complete.  Maria Teresa Torres, APRN  7/27/2022  08:31 CDT

## 2022-08-02 ENCOUNTER — OFFICE VISIT (OUTPATIENT)
Dept: FAMILY MEDICINE CLINIC | Facility: CLINIC | Age: 82
End: 2022-08-02

## 2022-08-02 VITALS
DIASTOLIC BLOOD PRESSURE: 70 MMHG | SYSTOLIC BLOOD PRESSURE: 130 MMHG | HEART RATE: 58 BPM | BODY MASS INDEX: 21.13 KG/M2 | OXYGEN SATURATION: 97 % | WEIGHT: 147.6 LBS | HEIGHT: 70 IN

## 2022-08-02 DIAGNOSIS — U07.1 COVID: ICD-10-CM

## 2022-08-02 DIAGNOSIS — R05.9 COUGH: Primary | ICD-10-CM

## 2022-08-02 PROCEDURE — 99214 OFFICE O/P EST MOD 30 MIN: CPT | Performed by: NURSE PRACTITIONER

## 2022-08-02 RX ORDER — BROMPHENIRAMINE MALEATE, PSEUDOEPHEDRINE HYDROCHLORIDE, AND DEXTROMETHORPHAN HYDROBROMIDE 2; 30; 10 MG/5ML; MG/5ML; MG/5ML
5 SYRUP ORAL 4 TIMES DAILY PRN
Qty: 118 ML | Refills: 0 | Status: SHIPPED | OUTPATIENT
Start: 2022-08-02 | End: 2022-09-29

## 2022-08-02 NOTE — PROGRESS NOTES
"Chief Complaint  Cough (Post covid cough)    Subjective        Steve Bethea presents to [facility]  History of Present Illness    The patient presents today for complaints of continued cough after COVID-19.    He reports that he was diagnosed with COVID-19 on 07/18/2021. The patient adds that he was given 4 pills of prednisone and he is not taking any other medications. He reports that he is still having a lingering cough. The patient adds that he is short of breath and coughing up green phlegm since he came back from the hospital. He denies any nasal congestion, sore throat, headache, or tiredness. The patient adds that he exercises a little bit and tries to eat well.    The patient states he had pneumonia as a child.    Objective   Vital Signs:  /70   Pulse 58   Ht 177.8 cm (70\")   Wt 67 kg (147 lb 9.6 oz)   SpO2 97%   BMI 21.18 kg/m²   Estimated body mass index is 21.18 kg/m² as calculated from the following:    Height as of this encounter: 177.8 cm (70\").    Weight as of this encounter: 67 kg (147 lb 9.6 oz).    BMI is within normal parameters. No other follow-up for BMI required.      Physical Exam  Vitals and nursing note reviewed.   Constitutional:       General: He is not in acute distress.     Appearance: Normal appearance. He is normal weight. He is not ill-appearing, toxic-appearing or diaphoretic.   HENT:      Head: Normocephalic and atraumatic.      Comments: TMs scared     Right Ear: Ear canal normal. Tympanic membrane is scarred.      Left Ear: Ear canal normal. Tympanic membrane is scarred.   Neck:      Comments: without any adenopathy.  Cardiovascular:      Rate and Rhythm: Normal rate and regular rhythm.      Heart sounds: Normal heart sounds. No murmur heard.    No friction rub. No gallop.      Comments: Heart rate is regular, bradycardic when vital signs were taken; however, when I auscultated, he is in the low 60 " bpm.  Pulmonary:      Effort: Pulmonary effort is normal. No respiratory distress.      Breath sounds: Normal breath sounds. No stridor. No wheezing, rhonchi or rales.      Comments: Lung fields are essentially clear without wheezes, rhonchi, or rales auscultated. No respiratory distress is noted. Pulse oximetry on room air is 97 percent.  Incidentally, no cough while he is in office today  Musculoskeletal:      Cervical back: Neck supple.   Lymphadenopathy:      Cervical: No cervical adenopathy.   Skin:     General: Skin is warm and dry.      Coloration: Skin is not jaundiced or pale.      Findings: No bruising, erythema, lesion or rash.   Neurological:      Mental Status: He is alert and oriented to person, place, and time.      Cranial Nerves: No cranial nerve deficit.      Motor: No weakness.      Coordination: Coordination normal.      Gait: Gait normal.      Comments: He is ambulatory without assistance. Gait is not guarded.   Psychiatric:         Mood and Affect: Mood normal.         Behavior: Behavior normal. Behavior is cooperative.         Thought Content: Thought content normal.         Judgment: Judgment normal.      Comments: Talkative, appearing well kempt today, discussing his problems openly, making eye contact        Result Review :                Assessment and Plan   Diagnoses and all orders for this visit:    1. Cough (Primary)  -     XR Chest 2 View; Future  -     CBC & Differential; Future  -     Comprehensive metabolic panel; Future  -     brompheniramine-pseudoephedrine-DM 30-2-10 MG/5ML syrup; Take 5 mL by mouth 4 (Four) Times a Day As Needed for Congestion or Cough.  Dispense: 118 mL; Refill: 0    2. COVID  -     XR Chest 2 View; Future  -     CBC & Differential; Future  -     Comprehensive metabolic panel; Future  -     brompheniramine-pseudoephedrine-DM 30-2-10 MG/5ML syrup; Take 5 mL by mouth 4 (Four) Times a Day As Needed for Congestion or Cough.  Dispense: 118 mL; Refill: 0    I will  obtain CBC, CMP, chest x-ray, and inform him of results via phone. He is given Bromfed to use as needed for cough and congestion. We will await above test results and he is advised that further treatment will be dependent upon these results. He will follow up if his complaints should persist or worsen or as scheduled for chronic conditions otherwise. All questions and concerns are addressed with understanding verbalized. The patient is in agreement to this plan.     I spent 22 minutes caring for Steve on this date of service. This time includes time spent by me in the following activities:preparing for the visit, reviewing tests, performing a medically appropriate examination and/or evaluation , counseling and educating the patient/family/caregiver, ordering medications, tests, or procedures and documenting information in the medical record  Follow Up   Return if symptoms worsen or fail to improve, for Recheck, or sooner as needed.  Patient was given instructions and counseling regarding his condition or for health maintenance advice. Please see specific information pulled into the AVS if appropriate.     Transcribed from ambient dictation for VONDA Stroud by NADEGE SAM.  08/02/22   08:37 CDT    Patient verbalized consent to the visit recording.  I have personally performed the services described in this document as transcribed by the above individual, and it is both accurate and complete.  VONDA Stroud  8/2/2022  12:31 CDT

## 2022-09-29 ENCOUNTER — OFFICE VISIT (OUTPATIENT)
Dept: FAMILY MEDICINE CLINIC | Facility: CLINIC | Age: 82
End: 2022-09-29

## 2022-09-29 VITALS
DIASTOLIC BLOOD PRESSURE: 62 MMHG | TEMPERATURE: 96.9 F | OXYGEN SATURATION: 96 % | SYSTOLIC BLOOD PRESSURE: 92 MMHG | HEART RATE: 64 BPM | BODY MASS INDEX: 21.05 KG/M2 | HEIGHT: 70 IN | WEIGHT: 147 LBS

## 2022-09-29 DIAGNOSIS — R05.9 COUGH: ICD-10-CM

## 2022-09-29 DIAGNOSIS — J01.10 ACUTE NON-RECURRENT FRONTAL SINUSITIS: Primary | ICD-10-CM

## 2022-09-29 DIAGNOSIS — J30.2 SEASONAL ALLERGIC RHINITIS, UNSPECIFIED TRIGGER: ICD-10-CM

## 2022-09-29 PROCEDURE — 99213 OFFICE O/P EST LOW 20 MIN: CPT | Performed by: NURSE PRACTITIONER

## 2022-09-29 RX ORDER — CETIRIZINE HYDROCHLORIDE 10 MG/1
10 TABLET ORAL DAILY
COMMUNITY
Start: 2022-09-22 | End: 2022-10-26 | Stop reason: SDUPTHER

## 2022-09-29 RX ORDER — METHYLPREDNISOLONE 4 MG/1
TABLET ORAL
Qty: 1 EACH | Refills: 0 | Status: SHIPPED | OUTPATIENT
Start: 2022-09-29 | End: 2022-10-26

## 2022-09-29 RX ORDER — CEPHALEXIN 500 MG/1
500 CAPSULE ORAL 3 TIMES DAILY
Qty: 21 CAPSULE | Refills: 0 | Status: SHIPPED | OUTPATIENT
Start: 2022-09-29 | End: 2022-10-06

## 2022-09-29 RX ORDER — BENZONATATE 100 MG/1
CAPSULE ORAL
COMMUNITY
Start: 2022-09-22

## 2022-09-29 NOTE — PROGRESS NOTES
"Chief Complaint  Sinus Problem (Drainage, and congestion)    Subjective        Steve Bethea presents to [facility] [place]  History of Present Illness  Patient here today with complaints of sinus pain congestion cough sinus drainage of bloody and yellowish drainage for the last 2 to 3 weeks.  He denies fever.  No complaints of shortness of breath.  Has not used anything over-the-counter for his complaints yet.  Does report recurrent sinusitis off and on throughout the year.  Declines seeing ENT.  Objective   Vital Signs:  BP 92/62   Pulse 64   Temp 96.9 °F (36.1 °C)   Ht 177.8 cm (70\")   Wt 66.7 kg (147 lb)   SpO2 96%   BMI 21.09 kg/m²   Estimated body mass index is 21.09 kg/m² as calculated from the following:    Height as of this encounter: 177.8 cm (70\").    Weight as of this encounter: 66.7 kg (147 lb).    BMI is within normal parameters. No other follow-up for BMI required.      Physical Exam  Vitals and nursing note reviewed.   Constitutional:       General: He is not in acute distress.     Appearance: Normal appearance. He is not ill-appearing, toxic-appearing or diaphoretic.   HENT:      Head: Normocephalic and atraumatic.      Right Ear: Hearing normal. A middle ear effusion is present. There is no impacted cerumen. Tympanic membrane is not perforated.      Left Ear: Hearing normal. A middle ear effusion is present. There is no impacted cerumen. Tympanic membrane is not perforated.      Nose: Congestion present.      Right Sinus: Maxillary sinus tenderness present. No frontal sinus tenderness.      Left Sinus: Maxillary sinus tenderness present. No frontal sinus tenderness.      Mouth/Throat:      Pharynx: Posterior oropharyngeal erythema present. No oropharyngeal exudate.      Comments: He has yellowish drainage noted post pharynx with cobblestoning appearance   Cardiovascular:      Rate and Rhythm: Normal rate and regular rhythm.      Heart sounds: " Normal heart sounds. No murmur heard.    No friction rub. No gallop.   Pulmonary:      Effort: Pulmonary effort is normal. No respiratory distress.      Breath sounds: Normal breath sounds. No stridor. No wheezing, rhonchi or rales.      Comments: Pulse ox on RA 96%  Musculoskeletal:      Cervical back: Neck supple.   Lymphadenopathy:      Cervical: No cervical adenopathy.   Skin:     General: Skin is warm and dry.      Coloration: Skin is not jaundiced or pale.      Findings: No bruising, erythema, lesion or rash.   Neurological:      Mental Status: He is alert and oriented to person, place, and time.      Cranial Nerves: No cranial nerve deficit.      Motor: No weakness.      Coordination: Coordination normal.      Gait: Gait normal.   Psychiatric:         Mood and Affect: Mood normal.         Behavior: Behavior normal.         Thought Content: Thought content normal.         Judgment: Judgment normal.        Result Review :                Assessment and Plan   Diagnoses and all orders for this visit:    1. Acute non-recurrent frontal sinusitis (Primary)  -     methylPREDNISolone (MEDROL) 4 MG dose pack; Take as directed on package instructions.  Dispense: 1 each; Refill: 0  -     cephalexin (Keflex) 500 MG capsule; Take 1 capsule by mouth 3 (Three) Times a Day for 7 days.  Dispense: 21 capsule; Refill: 0    2. Cough    3. Seasonal allergic rhinitis, unspecified trigger    He is treated with Medrol Dosepak and Keflex as above we will hold on any nasal steroid due to nosebleeds occasionally.  He is advised that he can use Vaseline in his nose for lubrication.  If symptoms persist or worsen he will call back and let me know or come back for recheck.  He is offered but declined referral to ENT.  All questions and concerns addressed with understanding verbalized.  He is aware and in agreement to this plan.     I spent 22 minutes caring for Steve on this date of service. This time includes time spent by me in the  following activities:preparing for the visit, performing a medically appropriate examination and/or evaluation , counseling and educating the patient/family/caregiver, ordering medications, tests, or procedures and documenting information in the medical record  Follow Up   Return if symptoms worsen or fail to improve, for Recheck, or sooner as needed.  Patient was given instructions and counseling regarding his condition or for health maintenance advice. Please see specific information pulled into the AVS if appropriate.

## 2022-10-06 DIAGNOSIS — R05.3 CHRONIC COUGH: ICD-10-CM

## 2022-10-06 DIAGNOSIS — J01.10 ACUTE NON-RECURRENT FRONTAL SINUSITIS: Primary | ICD-10-CM

## 2022-10-20 ENCOUNTER — OFFICE VISIT (OUTPATIENT)
Dept: OTOLARYNGOLOGY | Facility: CLINIC | Age: 82
End: 2022-10-20

## 2022-10-20 VITALS — HEIGHT: 70 IN | WEIGHT: 147 LBS | OXYGEN SATURATION: 97 % | BODY MASS INDEX: 21.05 KG/M2 | HEART RATE: 64 BPM

## 2022-10-20 DIAGNOSIS — R09.82 POST-NASAL DRIP: Primary | ICD-10-CM

## 2022-10-20 DIAGNOSIS — J34.89 RHINORRHEA: ICD-10-CM

## 2022-10-20 PROCEDURE — 99204 OFFICE O/P NEW MOD 45 MIN: CPT | Performed by: OTOLARYNGOLOGY

## 2022-10-20 RX ORDER — ALBUTEROL SULFATE 90 UG/1
2 AEROSOL, METERED RESPIRATORY (INHALATION)
COMMUNITY
Start: 2022-09-22 | End: 2022-10-23

## 2022-10-20 RX ORDER — FLUTICASONE PROPIONATE 50 MCG
1 SPRAY, SUSPENSION (ML) NASAL DAILY
COMMUNITY
Start: 2022-09-22 | End: 2023-01-19 | Stop reason: SDUPTHER

## 2022-10-20 NOTE — PROGRESS NOTES
Chief complaint: Sinusitis    Assessment and Plan:  82-year-old male With PMH including HTN, CAD, GERD, now with post-COVID rhinorrhea and postnasal drip    -He has no evidence of polyps or sinus disease on today's evaluation, given his symptoms I recommend he start [employer]Med sinus irrigations at least 2 times daily we did discuss how these are utilized.  -We also discussed that the presence of symptomatology for 6 to 8 weeks would not meet criteria for any chronic sinusitis as these have to be present for by definition at a minimum 12 weeks.  -We discussed that his slow decrease in sense of smell is likely related to normal changes of aging and is normal  -Regarding his questions about over-the-counter supplements, we did discuss extensively that there is no quality evidence that over-the-counter supplements provide benefit related to the nose, sinuses, infections in general and that unless he has documented laboratory works of a deficiency of 1 area or another I would not recommend any type of supplementation specifically  -Guarding his sleep concerns I suggest that he touch base with his primary care APRN to see if they have any insight  -Follow-up as needed    A total of 50 minutes were spent reviewing notes and imaging prior to the patient's visit on the same day, and obtaining a history and performing a physical exam of the patient in addition to counseling the patient related to his visit today.    History of present illness:    Mr. Bethea is an 82-year-old male with PMH including HTN, CAD, GERD, presenting for evaluation of chronic frontal sinusitis.  He states that his symptoms have been present for the last 6 to 8 weeks after he contracted COVID-19 acutely.  He is very concerned about his financial stability and brings this up multiple times throughout the visit.  He states that his symptoms initially consisted of a productive cough, but the cough has gotten significantly better and seems to be provoked more  by postnasal drainage and rhinorrhea which have persisted since his COVID.  He states that prior to suzie COVID he did not have any concerns with nasal drainage.  He thinks his sense of smell is mildly decreased but this happened slowly over time and he still is able to smell some things.  He thinks his sense of taste is still intact.  He does not have any foul taste or foul smell.  He states that the Keflex and Medrol Dosepak that he was given at the end of September did not affect his symptoms.  He has not used any nasal saline or irrigations in the past.  No other acute ENT concerns today.    Vital Signs   Vitals:    10/20/22 1500   Pulse: 64   SpO2: 97%     Physical Exam:  General: NAD, awake and alert  Head: normocephalic, atraumatic  Eyes: EOMI, sclerae white, conjunctivae pink  Ears: pinnae intact without masses or lesions   Right: TM intact without injection or effusion   Left: TM intact without injection or effusion, there is 50% cerumen impaction  Nose: external straight without deformity   Mucosa pink, not edematous. No polyps seen. No purulence.   Septum: midline   Turbinates: not hypertrophied  OC/OP: mucosa moist and pink, no masses or lesions, tongue is midline and mobile. Tonsils 2+ without exudate. Uvula single and elevates symmetrically.  Neck: supple, no masses or lesions. Thyroid without palpable masses.  Neuro: CN II - XII grossly intact, no focal deficits, requires frequent redirection to stay on topic.    Results Review:  Primary APRN note from 9/29/2022 reviewed today and demonstrates complaint of postnasal drip and rhinorrhea diagnosed as chronic frontal sinusitis and treated with Keflex and Medrol Dosepak, referral made upon subsequent phone call  Brain MRI from 12/23/2020 independently reviewed today and demonstrates no evidence of mucosal thickening, polyps, obstruction of the paranasal sinuses bilaterally including the bilateral maxillary, bilateral ethmoid, bilateral sphenoid,  bilateral frontal sinuses.    Review of Systems:  Positive ROS items: Unexpected weight loss, hearing loss, eye itching, wheezing, shortness of breath, numbness, and easy bruising or bleeding.  Otherwise, a 14 system ROS is negative except as pertinent positives are mentioned above.    Histories:  No Known Allergies    Prior to Admission medications    Medication Sig Start Date End Date Taking? Authorizing Provider   albuterol sulfate  (90 Base) MCG/ACT inhaler Inhale 2 puffs. 9/22/22 10/23/22 Yes Rei Brower MD   ascorbic acid (VITAMIN C) 500 MG capsule controlled-release CR capsule Take  by mouth.   Yes Rei Brower MD   aspirin 81 MG EC tablet Take 81 mg by mouth Daily.   Yes Rei Brower MD   atorvastatin (LIPITOR) 80 MG tablet Take 80 mg by mouth Every Night.   Yes Rei Brower MD   benzonatate (TESSALON) 100 MG capsule TAKE 1 CAPSULE BY MOUTH THREE TIMES DAILY AS NEEDED for cough for up to 7 DAYS 9/22/22  Yes Rei Brower MD   cetirizine (zyrTEC) 10 MG tablet Take 10 mg by mouth Daily. 9/22/22  Yes Rei Brower MD   Cholecalciferol 2000 UNITS tablet Take 2,000 Units by mouth Daily.   Yes Rei Brower MD   clopidogrel (PLAVIX) 75 MG tablet Take 75 mg by mouth Daily.   Yes Rei Brower MD   enalapril (VASOTEC) 20 MG tablet Take 20 mg by mouth Daily.   Yes Rei Brower MD   Flaxseed, Linseed, (FLAXSEED OIL PO) Take 1 tablet by mouth Daily.   Yes Rei Brower MD   fluticasone (FLONASE) 50 MCG/ACT nasal spray 1 spray into the nostril(s) as directed by provider Daily. 9/22/22 10/23/22 Yes Rei Brower MD   hydroCHLOROthiazide (MICROZIDE) 12.5 MG capsule Take 1 capsule by mouth Daily. 11/19/21  Yes Rei Brower MD   Magnesium 250 MG tablet Take 250 mg by mouth Daily.   Yes Rei Brower MD   methylPREDNISolone (MEDROL) 4 MG dose pack Take as directed on package instructions. 9/29/22  Yes Melissa  VONDA Calloway   nitroglycerin (NITROSTAT) 0.4 MG SL tablet Place 0.4 mg under the tongue Every 5 (Five) Minutes As Needed.   Yes Provider, MD Rei   Omega-3 Fatty Acids (FISH OIL PO) Take 1 capsule by mouth Daily.   Yes Provider, MD Rei       Past Medical History:   Diagnosis Date   • Coronary arteriosclerosis     3 stents, followed by Dr. Pond   • Degenerative joint disease involving multiple joints    • Elevated cholesterol    • Hypertension    • Plantar fasciitis    • Sleep apnea    • Upper respiratory infection        Past Surgical History:   Procedure Laterality Date   • ANGIOPLASTY  12/17/2012    x 1 stent   • CARDIAC CATHETERIZATION     • COLONOSCOPY     • COLONOSCOPY N/A 5/6/2022    Procedure: COLONOSCOPY;  Surgeon: Adam Monaco MD;  Location: [facility] ENDOSCOPY;  Service: General;  Laterality: N/A;   • ENDOSCOPY N/A 05/11/2018    Procedure: ESOPHAGOGASTRODUODENOSCOPY possible dilation;  Surgeon: Praneeth Ignacio MD;  Location: [facility] ENDOSCOPY;  Service: Gastroenterology   • EYE SURGERY Bilateral     cataract   • INJECTION OF MEDICATION  04/03/2013    Inj(s) Tend-Sheath, Ligament, Single 02616 (1)         Social History     Socioeconomic History   • Marital status: Single   Tobacco Use   • Smoking status: Former   • Smokeless tobacco: Never   Vaping Use   • Vaping Use: Never used   Substance and Sexual Activity   • Alcohol use: No   • Drug use: No   • Sexual activity: Defer       Family History   Problem Relation Age of Onset   • Alzheimer's disease Mother    • Hypertension Mother    • Colon cancer Father    • Hypertension Father    • Stroke Other         uncle   • Heart disease Other    • Cancer Brother              This document has been electronically signed by Angelita Alston MD on October 20, 2022 16:09 CDT

## 2022-10-26 ENCOUNTER — OFFICE VISIT (OUTPATIENT)
Dept: FAMILY MEDICINE CLINIC | Facility: CLINIC | Age: 82
End: 2022-10-26

## 2022-10-26 VITALS
TEMPERATURE: 97.1 F | OXYGEN SATURATION: 98 % | WEIGHT: 151.8 LBS | HEART RATE: 63 BPM | HEIGHT: 70 IN | BODY MASS INDEX: 21.73 KG/M2 | DIASTOLIC BLOOD PRESSURE: 70 MMHG | SYSTOLIC BLOOD PRESSURE: 110 MMHG

## 2022-10-26 DIAGNOSIS — J30.2 SEASONAL ALLERGIC RHINITIS, UNSPECIFIED TRIGGER: Primary | ICD-10-CM

## 2022-10-26 DIAGNOSIS — G47.00 INSOMNIA, UNSPECIFIED TYPE: ICD-10-CM

## 2022-10-26 PROCEDURE — 99213 OFFICE O/P EST LOW 20 MIN: CPT | Performed by: NURSE PRACTITIONER

## 2022-10-26 RX ORDER — LORATADINE 10 MG/1
CAPSULE, LIQUID FILLED ORAL
COMMUNITY
End: 2023-01-19

## 2022-10-26 NOTE — PROGRESS NOTES
"Chief Complaint  Allergies    Subjective        Steve Bethae presents to [facility] [place]  History of Present Illness  The patient presents today for complaints of feeling sick and having allergies. He states that he is having issues with insomnia due to his symptoms and is only sleeping 2 hours a night. He states that he takes Claritin every 24 hours. He states that he has been using nasal spray currently. He states he would like to try using Benadryl to help him with sleeping better. He states that he would like to wait to get his flu vaccine. He has seen ENT who started him on nasal lavage but he did not like the way it made him feel.     Objective   Vital Signs:  /70   Pulse 63   Temp 97.1 °F (36.2 °C)   Ht 177.8 cm (70\")   Wt 68.9 kg (151 lb 12.8 oz)   SpO2 98%   BMI 21.78 kg/m²   Estimated body mass index is 21.78 kg/m² as calculated from the following:    Height as of this encounter: 177.8 cm (70\").    Weight as of this encounter: 68.9 kg (151 lb 12.8 oz).    BMI is within normal parameters. No other follow-up for BMI required.      Physical Exam  Vitals and nursing note reviewed.   Constitutional:       General: He is not in acute distress.     Appearance: Normal appearance. He is normal weight. He is not ill-appearing, toxic-appearing or diaphoretic.   HENT:      Head: Normocephalic and atraumatic.   Neck:      Vascular: No carotid bruit.   Cardiovascular:      Rate and Rhythm: Normal rate and regular rhythm.      Heart sounds: Normal heart sounds. No murmur heard.    No friction rub. No gallop.   Pulmonary:      Effort: Pulmonary effort is normal. No respiratory distress.      Breath sounds: Normal breath sounds. No stridor. No wheezing, rhonchi or rales.      Comments: Pulse oximetry on room air is 98%.   Skin:     General: Skin is warm and dry.      Coloration: Skin is not jaundiced or pale.      Findings: No bruising, erythema, lesion or " rash.   Neurological:      Mental Status: He is alert and oriented to person, place, and time.      Cranial Nerves: No cranial nerve deficit.      Motor: No weakness.      Coordination: Coordination normal.      Gait: Gait normal.   Psychiatric:         Mood and Affect: Mood normal.         Behavior: Behavior normal.         Thought Content: Thought content normal.         Judgment: Judgment normal.        Result Review :                Assessment and Plan   Diagnoses and all orders for this visit:    1. Seasonal allergic rhinitis, unspecified trigger (Primary)    2. Insomnia, unspecified type    He is advised that he can take Benadryl every night and Claritin every morning. Zyrtec is removed from his medication list since he is no longer taking this and does not need to because of duplication of medication. He has already seen ENT and has been treated. If his symptoms should persist or worsen, he is advised that he can be referred to an allergist if he so chooses. He feels like most of his need right now is just to sleep better and so he can start Benadryl. If this does not help, then we can consider alternative therapy. He will follow up as scheduled for chronic conditions or as needed otherwise. All questions and concerns are addressed with understanding noted. The patient is in agreement to above plan.     I spent 22 minutes caring for Steve on this date of service. This time includes time spent by me in the following activities:preparing for the visit, performing a medically appropriate examination and/or evaluation , counseling and educating the patient/family/caregiver, ordering medications, tests, or procedures and documenting information in the medical record  Follow Up   Return if symptoms worsen or fail to improve, for Recheck, or sooner as needed.  Patient was given instructions and counseling regarding his condition or for health maintenance advice. Please see specific information pulled into the AVS  if appropriate.     Transcribed from ambient dictation for VONDA Stroud by Manisha Lott.  10/26/22   12:36 CDT    Patient or patient representative verbalized consent to the visit recording.  I have personally performed the services described in this document as transcribed by the above individual, and it is both accurate and complete.  VONDA Stroud  10/26/2022  12:49 CDT

## 2023-01-19 ENCOUNTER — OFFICE VISIT (OUTPATIENT)
Dept: FAMILY MEDICINE CLINIC | Facility: CLINIC | Age: 83
End: 2023-01-19
Payer: MEDICARE

## 2023-01-19 VITALS
SYSTOLIC BLOOD PRESSURE: 108 MMHG | OXYGEN SATURATION: 95 % | BODY MASS INDEX: 21.62 KG/M2 | TEMPERATURE: 98.3 F | HEART RATE: 62 BPM | WEIGHT: 151 LBS | DIASTOLIC BLOOD PRESSURE: 70 MMHG | HEIGHT: 70 IN

## 2023-01-19 DIAGNOSIS — J06.9 ACUTE URI: ICD-10-CM

## 2023-01-19 DIAGNOSIS — R05.2 SUBACUTE COUGH: ICD-10-CM

## 2023-01-19 DIAGNOSIS — J30.2 SEASONAL ALLERGIC RHINITIS, UNSPECIFIED TRIGGER: Primary | ICD-10-CM

## 2023-01-19 PROCEDURE — 99213 OFFICE O/P EST LOW 20 MIN: CPT | Performed by: NURSE PRACTITIONER

## 2023-01-19 PROCEDURE — 96372 THER/PROPH/DIAG INJ SC/IM: CPT | Performed by: NURSE PRACTITIONER

## 2023-01-19 RX ORDER — TRIAMCINOLONE ACETONIDE 40 MG/ML
60 INJECTION, SUSPENSION INTRA-ARTICULAR; INTRAMUSCULAR ONCE
Status: COMPLETED | OUTPATIENT
Start: 2023-01-19 | End: 2023-01-19

## 2023-01-19 RX ORDER — ATORVASTATIN CALCIUM 80 MG/1
1 TABLET, FILM COATED ORAL DAILY
COMMUNITY
Start: 2022-12-29

## 2023-01-19 RX ORDER — DUTASTERIDE 0.5 MG/1
0.5 CAPSULE, LIQUID FILLED ORAL DAILY
COMMUNITY
Start: 2022-11-29 | End: 2023-04-06

## 2023-01-19 RX ORDER — ENALAPRIL MALEATE 20 MG/1
1 TABLET ORAL DAILY
COMMUNITY
Start: 2022-12-29

## 2023-01-19 RX ORDER — FLUTICASONE PROPIONATE 50 MCG
2 SPRAY, SUSPENSION (ML) NASAL DAILY
Qty: 1 G | Refills: 0 | Status: SHIPPED | OUTPATIENT
Start: 2023-01-19

## 2023-01-19 RX ORDER — AZITHROMYCIN 250 MG/1
TABLET, FILM COATED ORAL
Qty: 6 TABLET | Refills: 0 | Status: SHIPPED | OUTPATIENT
Start: 2023-01-19

## 2023-01-19 RX ORDER — CLOPIDOGREL BISULFATE 75 MG/1
1 TABLET ORAL DAILY
COMMUNITY
Start: 2022-12-29

## 2023-01-19 RX ADMIN — TRIAMCINOLONE ACETONIDE 60 MG: 40 INJECTION, SUSPENSION INTRA-ARTICULAR; INTRAMUSCULAR at 08:37

## 2023-01-19 NOTE — PROGRESS NOTES
"Chief Complaint  Cough and Sinus Problem    Subjective        Steve Bethea presents to [facility]  History of Present Illness  Patient here today with complaints of sore throat cough nasal congestion inability to sleep well which is actually a chronic finding for him.  He reports fever as well and symptoms have been present for the last week.  Denies shortness of breath no complaints of chest pain or back pain either.  He has not tried any medicines over-the-counter or prescribed yet.    Objective   Vital Signs:  /70   Pulse 62   Temp 98.3 °F (36.8 °C)   Ht 177.8 cm (70\")   Wt 68.5 kg (151 lb)   SpO2 95%   BMI 21.67 kg/m²   Estimated body mass index is 21.67 kg/m² as calculated from the following:    Height as of this encounter: 177.8 cm (70\").    Weight as of this encounter: 68.5 kg (151 lb).     BMI is within normal parameters. No other follow-up for BMI required.    Physical Exam  Vitals and nursing note reviewed.   Constitutional:       General: He is not in acute distress.     Appearance: Normal appearance. He is normal weight. He is not ill-appearing, toxic-appearing or diaphoretic.   HENT:      Head: Normocephalic and atraumatic.      Right Ear: Ear canal and external ear normal. Decreased hearing noted. A middle ear effusion is present. There is no impacted cerumen.      Left Ear: Ear canal and external ear normal. Decreased hearing noted. A middle ear effusion is present. There is impacted cerumen (moderate amount of cerumen in canal noted).      Ears:      Comments: Decreased hearing bilaterally, a chronic finding for him, cobblestoning appearance noted to posterior pharynx which is mildly erythematous     Nose: Congestion present.      Mouth/Throat:      Mouth: Mucous membranes are moist.      Pharynx: Posterior oropharyngeal erythema present.   Cardiovascular:      Rate and Rhythm: Normal rate and regular rhythm.      Heart sounds: " Normal heart sounds. No murmur heard.    No friction rub. No gallop.   Pulmonary:      Effort: Pulmonary effort is normal. No respiratory distress.      Breath sounds: No stridor. Examination of the right-upper field reveals decreased breath sounds. Examination of the left-upper field reveals decreased breath sounds. Examination of the right-middle field reveals decreased breath sounds. Examination of the left-middle field reveals decreased breath sounds. Examination of the right-lower field reveals decreased breath sounds. Examination of the left-lower field reveals decreased breath sounds. Decreased breath sounds present. No wheezing, rhonchi or rales.      Comments: Pulse ox on room air 95% lung fields diminished but without wheezes rhonchi rales auscultated  Musculoskeletal:      Cervical back: Neck supple. No rigidity or tenderness.   Lymphadenopathy:      Cervical: No cervical adenopathy.   Skin:     General: Skin is warm and dry.      Coloration: Skin is not jaundiced or pale.      Findings: No bruising, erythema, lesion or rash.   Neurological:      Mental Status: He is alert and oriented to person, place, and time.      Cranial Nerves: No cranial nerve deficit.      Motor: No weakness.      Coordination: Coordination normal.      Gait: Gait normal.   Psychiatric:         Mood and Affect: Mood normal.         Behavior: Behavior normal.         Thought Content: Thought content normal.         Judgment: Judgment normal.        Result Review :                   Assessment and Plan   Diagnoses and all orders for this visit:    1. Seasonal allergic rhinitis, unspecified trigger (Primary)  -     triamcinolone acetonide (KENALOG-40) injection 60 mg  -     azithromycin (Zithromax Z-Trey) 250 MG tablet; Take 2 po now and 1 po d2-5  Dispense: 6 tablet; Refill: 0  -     fluticasone (FLONASE) 50 MCG/ACT nasal spray; 2 sprays into the nostril(s) as directed by provider Daily.  Dispense: 1 g; Refill: 0    2. Acute URI  -      triamcinolone acetonide (KENALOG-40) injection 60 mg  -     azithromycin (Zithromax Z-Trey) 250 MG tablet; Take 2 po now and 1 po d2-5  Dispense: 6 tablet; Refill: 0  -     fluticasone (FLONASE) 50 MCG/ACT nasal spray; 2 sprays into the nostril(s) as directed by provider Daily.  Dispense: 1 g; Refill: 0    3. Subacute cough  -     triamcinolone acetonide (KENALOG-40) injection 60 mg  -     azithromycin (Zithromax Z-Trey) 250 MG tablet; Take 2 po now and 1 po d2-5  Dispense: 6 tablet; Refill: 0  -     fluticasone (FLONASE) 50 MCG/ACT nasal spray; 2 sprays into the nostril(s) as directed by provider Daily.  Dispense: 1 g; Refill: 0    He is given a Kenalog injection 60 mg IM in office today Flonase and Z-Trey as above.  If his symptoms should persist or worsen he will call back and let me know or return for follow-up as scheduled for chronic conditions otherwise.  All questions and concerns addressed with understanding verbalized.  He is aware and in agreement to this plan.  He tolerated injection well today.     I spent 25 minutes caring for Steve on this date of service. This time includes time spent by me in the following activities:preparing for the visit, performing a medically appropriate examination and/or evaluation , counseling and educating the patient/family/caregiver, ordering medications, tests, or procedures and documenting information in the medical record  Follow Up   Return if symptoms worsen or fail to improve, for Recheck, or sooner as needed.  Patient was given instructions and counseling regarding his condition or for health maintenance advice. Please see specific information pulled into the AVS if appropriate.

## 2023-01-23 RX ORDER — BROMPHENIRAMINE MALEATE, PSEUDOEPHEDRINE HYDROCHLORIDE, AND DEXTROMETHORPHAN HYDROBROMIDE 2; 30; 10 MG/5ML; MG/5ML; MG/5ML
5 SYRUP ORAL 4 TIMES DAILY PRN
Qty: 118 ML | Refills: 0 | Status: SHIPPED | OUTPATIENT
Start: 2023-01-23

## 2023-06-05 ENCOUNTER — TRANSCRIBE ORDERS (OUTPATIENT)
Dept: LAB | Facility: OTHER | Age: 83
End: 2023-06-05
Payer: MEDICARE

## 2023-06-05 ENCOUNTER — LAB (OUTPATIENT)
Dept: LAB | Facility: OTHER | Age: 83
End: 2023-06-05
Payer: MEDICARE

## 2023-06-05 DIAGNOSIS — R97.20 ELEVATED PROSTATE SPECIFIC ANTIGEN (PSA): ICD-10-CM

## 2023-06-05 DIAGNOSIS — R97.20 ELEVATED PROSTATE SPECIFIC ANTIGEN (PSA): Primary | ICD-10-CM

## 2023-06-05 PROCEDURE — 36415 COLL VENOUS BLD VENIPUNCTURE: CPT | Performed by: INTERNAL MEDICINE

## 2023-06-05 PROCEDURE — 84153 ASSAY OF PSA TOTAL: CPT | Performed by: UROLOGY

## 2023-06-06 LAB — PSA SERPL-MCNC: 2.18 NG/ML (ref 0–4)

## 2023-06-14 ENCOUNTER — OFFICE VISIT (OUTPATIENT)
Dept: ORTHOPEDIC SURGERY | Facility: CLINIC | Age: 83
End: 2023-06-14
Payer: MEDICARE

## 2023-06-14 VITALS — WEIGHT: 151 LBS | HEIGHT: 70 IN | BODY MASS INDEX: 21.62 KG/M2

## 2023-06-14 DIAGNOSIS — M25.552 LEFT HIP PAIN: Primary | ICD-10-CM

## 2023-06-14 DIAGNOSIS — M70.62 TROCHANTERIC BURSITIS OF LEFT HIP: ICD-10-CM

## 2023-06-14 RX ORDER — LIDOCAINE HYDROCHLORIDE 10 MG/ML
INJECTION, SOLUTION INFILTRATION; PERINEURAL
Status: COMPLETED | OUTPATIENT
Start: 2023-06-14 | End: 2023-06-14

## 2023-06-14 RX ADMIN — LIDOCAINE HYDROCHLORIDE: 10 INJECTION, SOLUTION INFILTRATION; PERINEURAL at 09:49

## 2023-06-14 NOTE — PROGRESS NOTES
"  Steve Bethea is a 82 y.o. male   Primary provider:  Maria Teresa Torres APRN       Chief Complaint   Patient presents with   • Left Hip - Hip Pain, Pain, Initial Evaluation     HISTORY OF PRESENT ILLNESS:    History of Present Illness  82-year-old male patient presents to office for evaluation of acute left hip pain and possible injury.  Initial onset of pain occurred approximately 3 months ago when he sustained a fall at his home while in his garage, landing on his left side onto a concrete floor.  Patient states he had a large, dark bruise along the lateral hip at that time but that has resolved.  Patient states that overall the pain in his left hip has gradually improved since the fall but it has not completely resolved.  Pain is described as intermittent and mild in severity.  Patient localizes the pain along the lateral aspect of his left hip.  Pain is described as aching in nature.  Pain is worse with activity, walking and stair climbing and also when he lies on his left hip at night.  Pain improves with rest and position changes.  Patient is retired but is very active.  He lives alone and states he is always working on projects and \"on the go\".  He is noted to be ambulating in office today with no difficulty noted and is full weightbearing with a steady gait.  X-rays of the left hip/pelvis performed in office today.  Current pain scale is 7/10.    CONCURRENT MEDICAL HISTORY:    Past Medical History:   Diagnosis Date   • Coronary arteriosclerosis     3 stents, followed by Dr. Pond   • Degenerative joint disease involving multiple joints    • Elevated cholesterol    • Hypertension    • Plantar fasciitis    • Sleep apnea    • Upper respiratory infection        No Known Allergies      Current Outpatient Medications:   •  ascorbic acid (VITAMIN C) 500 MG capsule controlled-release CR capsule, Take  by mouth., Disp: , Rfl:   •  aspirin 81 MG EC tablet, Take 1 tablet by mouth Daily., Disp: , Rfl:   •  " atorvastatin (LIPITOR) 80 MG tablet, Take 1 tablet by mouth Daily., Disp: , Rfl:   •  Cholecalciferol 2000 UNITS tablet, Take 1 tablet by mouth Daily., Disp: , Rfl:   •  clopidogrel (PLAVIX) 75 MG tablet, Take 1 tablet by mouth Daily., Disp: , Rfl:   •  enalapril (VASOTEC) 20 MG tablet, Take 1 tablet by mouth Daily., Disp: , Rfl:   •  finasteride (PROSCAR) 5 MG tablet, TAKE 1 TABLET BY MOUTH once daily FOR PROSTATE, Disp: , Rfl:   •  Flaxseed, Linseed, (FLAXSEED OIL PO), Take 1 tablet by mouth Daily., Disp: , Rfl:   •  fluticasone (FLONASE) 50 MCG/ACT nasal spray, 2 sprays into the nostril(s) as directed by provider Daily., Disp: 1 g, Rfl: 0  •  hydroCHLOROthiazide (MICROZIDE) 12.5 MG capsule, Take 1 capsule by mouth Daily., Disp: , Rfl:   •  nitroglycerin (NITROSTAT) 0.4 MG SL tablet, Place 1 tablet under the tongue Every 5 (Five) Minutes As Needed., Disp: , Rfl:   •  Omega-3 Fatty Acids (FISH OIL PO), Take 1 capsule by mouth Daily., Disp: , Rfl:     Past Surgical History:   Procedure Laterality Date   • ANGIOPLASTY  12/17/2012    x 1 stent   • CARDIAC CATHETERIZATION     • COLONOSCOPY     • COLONOSCOPY N/A 5/6/2022    Procedure: COLONOSCOPY;  Surgeon: Adam Monaco MD;  Location: [facility] ENDOSCOPY;  Service: General;  Laterality: N/A;   • ENDOSCOPY N/A 05/11/2018    Procedure: ESOPHAGOGASTRODUODENOSCOPY possible dilation;  Surgeon: Praneeth Ignacio MD;  Location: [facility] ENDOSCOPY;  Service: Gastroenterology   • EYE SURGERY Bilateral     cataract   • INJECTION OF MEDICATION  04/03/2013    Inj(s) Tend-Sheath, Ligament, Single 24469 (1)         Family History   Problem Relation Age of Onset   • Alzheimer's disease Mother    • Hypertension Mother    • Colon cancer Father    • Hypertension Father    • Stroke Other         uncle   • Heart disease Other    • Cancer Brother        Social History     Socioeconomic History   • Marital status: Single   Tobacco Use   • Smoking status: Former   • Smokeless tobacco: Never  "  Vaping Use   • Vaping Use: Never used   Substance and Sexual Activity   • Alcohol use: No   • Drug use: No   • Sexual activity: Defer        Review of Systems   Constitutional: Positive for unexpected weight change.   HENT: Negative.    Eyes: Negative.    Respiratory: Negative.    Cardiovascular: Negative.    Gastrointestinal: Negative.    Endocrine: Negative.    Genitourinary: Negative.    Musculoskeletal: Positive for arthralgias.        Left hip pain.    Skin: Negative.    Allergic/Immunologic: Negative.    Neurological: Negative.    Hematological: Negative.    Psychiatric/Behavioral: Negative.        PHYSICAL EXAMINATION:       Ht 177.8 cm (70\")   Wt 68.5 kg (151 lb)   BMI 21.67 kg/m²     Physical Exam  Vitals reviewed.   Constitutional:       General: He is not in acute distress.     Appearance: He is well-developed. He is not ill-appearing.   HENT:      Head: Normocephalic.   Pulmonary:      Effort: Pulmonary effort is normal. No respiratory distress.   Abdominal:      General: There is no distension.      Palpations: Abdomen is soft.   Musculoskeletal:         General: Tenderness (Mild, left hip) present. No swelling, deformity or signs of injury.   Skin:     General: Skin is warm and dry.      Capillary Refill: Capillary refill takes less than 2 seconds.      Findings: No erythema.   Neurological:      Mental Status: He is alert and oriented to person, place, and time.      GCS: GCS eye subscore is 4. GCS verbal subscore is 5. GCS motor subscore is 6.      Sensory: No sensory deficit.   Psychiatric:         Speech: Speech normal.         Behavior: Behavior normal.         Thought Content: Thought content normal.         Judgment: Judgment normal.         GAIT:     []  Normal  [x]  Antalgic (mild)    Assistive device: [x]  None  []  Walker     []  Crutches  []  Cane     []  Wheelchair  []  Stretcher    Left Hip Exam     Tenderness   The patient is experiencing tenderness in the lateral and greater " trochanter (Mild).    Range of Motion   Abduction: 35   Flexion: 120   External rotation: 50   Internal rotation: 5     Muscle Strength   Abduction: 4/5   Flexion: 4/5     Tests   CATHY: negative  Fadir:  Negative FADIR test    Other   Erythema: absent  Sensation: normal  Pulse: present    Comments:  No pain elicited with range of motion of the hip joint.  Mild to moderate limitations with range of motion.  Overall, patient has good flexion and abduction of the hip joint and less mobility with external and internal rotation but no pain is reproduced.  Mild, localized tenderness overlying the greater trochanter.  No swelling appreciated.  No ecchymosis.  No warmth or erythema.  No signs of infection noted.  Neurovascularly intact.             XR Hip With or Without Pelvis 2 - 3 View Left    Result Date: 6/14/2023  Narrative: INDICATION: Pain. COMPARISON: 12/9/2020. FINDINGS: There are bilateral degenerative changes within the hips.  There is diffuse osteopenia.  No fracture or dislocation.  Lumbar spine degenerative changes are seen.      Large Joint Arthrocentesis: L greater trochanteric bursa  Date/Time: 6/14/2023 9:49 AM  Consent given by: patient  Timeout: Immediately prior to procedure a time out was called to verify the correct patient, procedure, equipment, support staff and site/side marked as required   Supporting Documentation  Indications: pain   Procedure Details  Location: hip - L greater trochanteric bursa  Preparation: Patient was prepped and draped in the usual sterile fashion  Needle size: 22 G  Approach: lateral  Medications administered: lidocaine 1 %; Triamcinolone Acetonide 80 MG/ML  Patient tolerance: patient tolerated the procedure well with no immediate complications      ASSESSMENT:    Diagnoses and all orders for this visit:    Left hip pain  -     Large Joint Arthrocentesis: L greater trochanteric bursa    Trochanteric bursitis of left hip  -     Large Joint Arthrocentesis: L greater  trochanteric bursa    PLAN    X-rays of the left hip/pelvis performed in office today and reviewed with no acute findings noted.  There is no evidence of acute or subacute fracture noted in the left hip joint or left pelvis.  There are mild degenerative changes noted in the left hip joint with mild joint space narrowing primarily along the inferior aspect.  Superiorly, joint spacing appears well-maintained.  The femoral head appears round and smooth.  There is some irregularity and osteophyte formations noted along the lateral hip at the greater trochanter.    Patient complains of persistent, but mild, left hip pain that he localizes to his lateral left hip after sustaining a fall about 3 months ago in his garage.  Patient states he had a large bruise at that time (he is on Plavix) but that has resolved.  He has not been having any difficulty ambulating or bearing weight and reports to me that he is very active and even dances for fun.  The patient does have some localized, mild tenderness overlying the greater trochanter and he does report some increased pain with activity as well as lying on his left hip at night.  Subjective complaints and physical exam are most consistent with trochanteric bursitis.  We discussed this diagnosis, its potential causes, treatment options/recommendations and expected outcomes.  We discussed that his fall and contusion to the left lateral hip may have triggered a bursitis.  Recommend proceeding today with a trial of a localized injection of steroid into the left hip bursa for management of pain/inflammation.     Recommend the following:    -Rest and activity modification as tolerated and based on pain.  -Use of a cane or walker as needed for modified weightbearing off the affected hip.   -Gradual progression of weightbearing and activity as pain allows.   -Ice therapy to the affected hip intermittently 3-4 times daily for 15 minutes at a time to minimize pain/inflammation.   -Tylenol  as needed for pain/discomfort.  Patient should avoid oral NSAIDs due to his current use of Plavix for chronic anticoagulation therapy as well as his cardiac history.    Follow-up in 2 to 4 weeks for recheck as needed for any new, worsening or persistent symptoms.  If his pain persists or worsens, consider advanced imaging with CT or MRI for further evaluation.  Consider referral to physical therapy.     Time spent of a minimum of 30 minutes including the face to face evaluation, reviewing of medical history and prior medial records, reviewing of diagnostic studies, documentation, patient education and coordination of care.     EMR Dragon/[employer] Disclaimer: Some of this note may be an electronic transcription/translation of spoken language to printed text using the Dragon Dictation System.     Return in about 2 weeks (around 6/28/2023), or if symptoms worsen or fail to improve, for Recheck.        This document has been electronically signed by VONDA Caraballo on June 14, 2023 11:36 CDT      VONDA Caraballo

## 2023-08-18 ENCOUNTER — OFFICE VISIT (OUTPATIENT)
Dept: ORTHOPEDIC SURGERY | Facility: CLINIC | Age: 83
End: 2023-08-18
Payer: MEDICARE

## 2023-08-18 VITALS — BODY MASS INDEX: 21.19 KG/M2 | HEIGHT: 70 IN | WEIGHT: 148 LBS

## 2023-08-18 DIAGNOSIS — M47.812 FACET ARTHROPATHY, CERVICAL: ICD-10-CM

## 2023-08-18 DIAGNOSIS — G89.29 CHRONIC PAIN OF RIGHT KNEE: ICD-10-CM

## 2023-08-18 DIAGNOSIS — M71.21 SYNOVIAL CYST OF RIGHT KNEE: ICD-10-CM

## 2023-08-18 DIAGNOSIS — M25.561 CHRONIC PAIN OF RIGHT KNEE: ICD-10-CM

## 2023-08-18 DIAGNOSIS — Z87.828: ICD-10-CM

## 2023-08-18 DIAGNOSIS — M50.30 DEGENERATIVE DISC DISEASE, CERVICAL: ICD-10-CM

## 2023-08-18 DIAGNOSIS — M54.2 NECK PAIN: Primary | ICD-10-CM

## 2023-08-18 DIAGNOSIS — M94.261 CHONDROMALACIA OF RIGHT KNEE: ICD-10-CM

## 2023-08-18 DIAGNOSIS — M17.11 PRIMARY OSTEOARTHRITIS OF RIGHT KNEE: ICD-10-CM

## 2023-08-18 RX ORDER — MULTIVITAMIN WITH IRON
TABLET ORAL
COMMUNITY

## 2023-08-18 RX ORDER — MULTIPLE VITAMINS W/ MINERALS TAB 9MG-400MCG
1 TAB ORAL DAILY
COMMUNITY

## 2023-08-18 RX ADMIN — TRIAMCINOLONE ACETONIDE 40 MG: 40 INJECTION, SUSPENSION INTRA-ARTICULAR; INTRAMUSCULAR at 09:56

## 2023-08-18 RX ADMIN — LIDOCAINE HYDROCHLORIDE 2 ML: 10 INJECTION, SOLUTION INFILTRATION; PERINEURAL at 09:56

## 2023-08-18 NOTE — PROGRESS NOTES
Steve Bethea is a 82 y.o. male returns for     Chief Complaint   Patient presents with    Right Knee - Follow-up, Edema, Pain    Cervical Spine - Pain, Initial Evaluation     HISTORY OF PRESENT ILLNESS: Patient presents to office for follow-up of chronic right knee pain.  Initial onset of right knee pain occurred several years ago and has progressively worsened over time.  Patient experienced acute exacerbation of right knee pain with swelling/effusion and no known injury in February 2019 and established care with orthopedics at that time.  Patient has been treated in the past with a previous therapeutic aspiration of his right knee joint, previous intra-articular injections of steroid, previous intramuscular injections of steroid as given by his PCP, modified weightbearing with use of a cane, Tylenol and prescription oral NSAIDs.  Patient typically gets good relief with intra-articular injections of steroid.  Last injection was given on 8/26/2020 per Dr. Graham, which again offered good pain relief.    Previous MRI imaging of the right knee performed in February 2019 showed evidence of grade III and IV chondromalacia, a complex tear of the medial meniscus, joint effusion at that time and a Baker's cyst.    Patient reports that his right knee pain has been worsening again in the past 6 weeks.  Patient reports that he did sustain a fall several weeks ago while visiting his son in another state and playing basketball, landing on his left hip.  He did not immediately associate the fall with any particular injury to his right knee and he is unsure if his worsening right knee pain is related to the fall.  Patient was recently evaluated at [facility] urgent care on 8/10/2023 with x-rays performed, which were negative for any acute bony abnormalities.  Patient is requesting a repeat injection in his right knee today in an effort to improve his pain again.  He has been taking Tylenol as needed for pain control.   "Current pain scale is 5/10.    Patient also has an additional complaint today of chronic neck pain.  Pain is described as aching in nature with frequent popping sensations and limited range of motion.  Patient denies any known injuries to his neck.  X-rays of the cervical spine performed in office today.    CONCURRENT MEDICAL HISTORY:    The following portions of the patient's history were reviewed and updated as appropriate: allergies, current medications, past family history, past medical history, past social history, past surgical history and problem list.     ROS  No fevers or chills.  No chest pain or shortness of air.  No GI or  disturbances.  Right knee pain.  Neck pain.    PHYSICAL EXAMINATION:       Ht 177.8 cm (70\")   Wt 67.1 kg (148 lb)   BMI 21.24 kg/mý     Physical Exam  Vitals reviewed.   Constitutional:       General: He is not in acute distress.     Appearance: He is well-developed. He is not ill-appearing.   HENT:      Head: Normocephalic.   Pulmonary:      Effort: Pulmonary effort is normal. No respiratory distress.   Abdominal:      General: There is no distension.      Palpations: Abdomen is soft.   Musculoskeletal:         General: Swelling (Mild, right knee) and tenderness (Mild, right knee, cervical spine) present. No deformity or signs of injury.      Right knee: No effusion.      Instability Tests: Medial Collins test positive. Lateral Collins test negative.   Skin:     General: Skin is warm and dry.      Capillary Refill: Capillary refill takes less than 2 seconds.      Findings: No erythema.   Neurological:      Mental Status: He is alert and oriented to person, place, and time.      GCS: GCS eye subscore is 4. GCS verbal subscore is 5. GCS motor subscore is 6.   Psychiatric:         Speech: Speech normal.         Behavior: Behavior normal.         Thought Content: Thought content normal.         Judgment: Judgment normal.       GAIT:     []  Normal  [x]  Antalgic (mild)    Assistive " device: [x]  None  []  Walker     []  Crutches  []  Cane     []  Wheelchair []  Stretcher    Right Knee Exam     Tenderness   The patient is experiencing tenderness in the medial joint line (Mild, diffuse).    Range of Motion   Extension:  0   Flexion:  110     Tests   Collins:  Medial - positive Lateral - negative  Varus: negative Valgus: negative    Other   Erythema: absent  Sensation: normal  Pulse: present  Swelling: mild  Effusion: no effusion present    Comments:  Mild pain and limitations with range of motion.  No signs of infection noted.      Back Exam     Tenderness   The patient is experiencing tenderness in the cervical (Mild).    Comments:  Mild pain and limitations with range of motion.  No focal neurological deficits noted.        XR Spine Cervical 2 or 3 View    Result Date: 8/18/2023  Narrative: HISTORY: Cervical pain. VIEWS: Frontal, lateral and open-mouth films of the cervical spine were obtained. FINDINGS: There is extensive bridging osteophyte formation extending from C3 through C7. This is unchanged compared to prior study.  There is loss of normal intervertebral disc space height at all cervical levels which is similar to the patient's prior study.  There is advanced facet arthropathy at all cervical levels.     Impression: 1.  Severe degenerative changes which are similar to the patient's prior study. 2.  Extensive bridging osteophyte formation. 3.  No acute fracture or dislocation seen. 4.  If there is high clinical suspicion for fracture, further evaluation with CT is recommended.    XR Knee 4+ View Right    Result Date: 8/10/2023  Narrative: RIGHT KNEE 4 VIEWS Indication: Acute pain of right knee. Fall, initial encounter. Right knee pain after fall. COMPARISON: None. FINDINGS: Knee joint is well-aligned. Mild osteoarthritic change present with small marginal osteophytes and mild medial compartment narrowing. No acute displaced fracture or subluxation. No significant joint effusion.  Atherosclerotic calcifications in the posterior soft tissues.    Impression:   No acute osseous abnormality. Mild osteoarthritic changes as above, not beyond expectation for age. [id number]      MRI right knee     HISTORY: Right knee pain, effusion. Prior examination knee fairly  12, 5999.     TECHNIQUE: Multiplanar multisequence noncontrast images right  knee.     FINDINGS:     Normal intact anterior and posterior cruciate ligaments.      Degenerative osteoarthritic changes. Cartilage loss, grade 3 and  grade IV chondromalacia (osteochondral defects) distal femur  medial aspect.     Complex degenerative type tear with a bucket-handle abnormality  posterior horn medial meniscus. Normal anterior horn. Subtle  signal abnormalities, mucoid type degenerative changes lateral  meniscus. No discrete tears identified.     Small effusion. Moderate size popliteal, Baker's cyst 5.25 x1.16  x 2.18 cm.          Normal medial collateral ligament and lateral collateral  ligament complex.     IMPRESSION:  CONCLUSION: Degenerative osteoarthritic changes medial aspect  tibiofemoral joint. Cartilage loss. Grade 3 and grade IV  chondromalacia (osteochondral defects) distal femur medial  aspect.     Complex tear and bucket-handle abnormality posterior horn medial  meniscus.  Normal anterior horn.      Areas of signal abnormality, mucoid type degenerative changes  lateral meniscus. No discrete tear is identified.     Joint effusion. Popliteal, Baker's cyst.         MRI right knee is otherwise unremarkable.     Electronically signed by:  Daniel Blanco MD  2/19/2019 2:12 PM CST  Workstation: [id number]      ASSESSMENT:    Diagnoses and all orders for this visit:    Neck pain  -     XR Spine Cervical 2 or 3 View; Future    Chronic pain of right knee  -     XR Knee 1 or 2 View Right  -     Large Joint Arthrocentesis: R knee    Primary osteoarthritis of right knee  -     Large Joint Arthrocentesis: R knee    Chondromalacia of right knee  -      Large Joint Arthrocentesis: R knee    History of bucket handle tear of medial meniscus  -     Large Joint Arthrocentesis: R knee    Synovial cyst of right knee  -     Large Joint Arthrocentesis: R knee    Facet arthropathy, cervical    Degenerative disc disease, cervical    PLAN    Large Joint Arthrocentesis: R knee  Date/Time: 8/18/2023 9:56 AM  Consent given by: patient  Timeout: Immediately prior to procedure a time out was called to verify the correct patient, procedure, equipment, support staff and site/side marked as required   Supporting Documentation  Indications: pain, joint swelling and diagnostic evaluation   Procedure Details  Location: knee - R knee  Preparation: Patient was prepped and draped in the usual sterile fashion  Needle size: 22 G  Approach: anterolateral  Medications administered: 40 mg triamcinolone acetonide 40 MG/ML; 2 mL lidocaine 1 %  Patient tolerance: patient tolerated the procedure well with no immediate complications    Patient complains of increased right knee pain in the past 6 weeks.  The patient did sustain a fall several weeks ago while visiting his [place] in another state when he was playing basketball but he states that he fell onto his left hip and at the time did not associated with any injury to his right knee.  He is unsure if his increased right knee pain is related to that fall.  Patient has a long history of chronic/recurrent right knee pain and has been treated conservatively as described in the HPI above.  Patient had recent x-rays performed of the right knee on 8/10/2023 at [facility] care, which were negative for any acute bony abnormalities.  Patient is requesting a repeat injection in his right knee today in an effort to improve his pain again.  Previous injections have offered good pain relief for several months at a time.  It has been nearly 3 years since his last injection.  Recommend proceeding today with an intra-articular injection of steroid into  the right knee for management of joint pain/inflammation/swelling.    Recommend a hinged knee brace to the right knee for added support.  This is placed/fitted in office today.  We discussed use of the brace during weightbearing activity but we also discussed that once his pain improves, he can transition out of the brace and use it on an as-needed basis.    Recommend the following:    -Rest and activity modification as tolerated and based on pain.  -Modified weightbearing of the affected extremity with use of a cane or walker as needed.  -Gradual progression of weightbearing and activity as pain and swelling allow.  -Conditioning and strengthening exercises of the bilateral knees/legs.  -Elevation and ice therapy to the affected knee to minimize pain/swelling/inflammation.   -Tylenol as needed for pain/discomfort.  Patient should avoid oral NSAIDs due to his current use of Plavix for chronic anticoagulation therapy.     Patient also has an additional complaint today of chronic neck pain with frequent popping sensations and neck stiffness.  X-rays of the cervical spine are performed in office today and reviewed with no acute findings noted.  Patient has advanced/severe multilevel degenerative changes with significantly advanced facet arthropathy and prominent bridging osteophytes throughout his entire cervical spine.  The radiologist does indicate that there have been no significant changes since his last images of the cervical spine.  We discussed that he has severe arthritic changes in his cervical spine.  He is not having any concerning radicular pain or complaints involving the upper extremities.  Patient is encouraged to utilize ice therapy and/or heat therapy to his neck to minimize pain/inflammation/muscle tension.  Recommend to continue with Tylenol as needed for pain/discomfort.  We discussed the possibility of intramuscular injections of steroid in the future for control of his chronic neck pain but we  will wait on this option since he had a right knee injection given in office today.  Another option would be to refer the patient to a pain management specialist for possible localized injections of steroid in the cervical spine.    Follow-up as needed for any new, worsening or persistent symptoms.  Consider trial of viscosupplementation if his right knee pain persists or worsens.  Consider referral to physical therapy.    Time spent of a minimum of 30 minutes including the face to face evaluation, reviewing of medical history and prior medial records, reviewing of diagnostic studies, documentation, patient education and coordination of care.      EMR Dragon/Eclectoription Disclaimer: Some of this note may be an electronic transcription/translation of spoken language to printed text using the Dragon Dictation System.      Return if symptoms worsen or fail to improve.       This document has been electronically signed by VONDA Caraballo on August 20, 2023 21:08 CDT       VONDA Caraballo

## 2023-08-20 PROBLEM — M47.812 FACET ARTHROPATHY, CERVICAL: Status: ACTIVE | Noted: 2023-08-20

## 2023-08-20 PROBLEM — M54.2 NECK PAIN: Status: ACTIVE | Noted: 2023-08-20

## 2023-08-20 PROBLEM — M94.261 CHONDROMALACIA OF RIGHT KNEE: Status: ACTIVE | Noted: 2023-08-20

## 2023-08-20 PROBLEM — M71.21 SYNOVIAL CYST OF RIGHT KNEE: Status: ACTIVE | Noted: 2023-08-20

## 2023-08-20 PROBLEM — M50.30 DEGENERATIVE DISC DISEASE, CERVICAL: Status: ACTIVE | Noted: 2023-08-20

## 2023-08-20 PROBLEM — Z87.828: Status: ACTIVE | Noted: 2023-08-20

## 2023-08-20 RX ORDER — TRIAMCINOLONE ACETONIDE 40 MG/ML
40 INJECTION, SUSPENSION INTRA-ARTICULAR; INTRAMUSCULAR
Status: COMPLETED | OUTPATIENT
Start: 2023-08-18 | End: 2023-08-18

## 2023-08-20 RX ORDER — LIDOCAINE HYDROCHLORIDE 10 MG/ML
2 INJECTION, SOLUTION INFILTRATION; PERINEURAL
Status: COMPLETED | OUTPATIENT
Start: 2023-08-18 | End: 2023-08-18

## 2023-09-13 ENCOUNTER — OFFICE VISIT (OUTPATIENT)
Dept: ORTHOPEDIC SURGERY | Facility: CLINIC | Age: 83
End: 2023-09-13
Payer: MEDICARE

## 2023-09-13 VITALS — BODY MASS INDEX: 21.62 KG/M2 | HEIGHT: 70 IN | WEIGHT: 151 LBS

## 2023-09-13 DIAGNOSIS — M71.21 SYNOVIAL CYST OF RIGHT KNEE: ICD-10-CM

## 2023-09-13 DIAGNOSIS — M50.30 DEGENERATIVE DISC DISEASE, CERVICAL: ICD-10-CM

## 2023-09-13 DIAGNOSIS — M17.11 PRIMARY OSTEOARTHRITIS OF RIGHT KNEE: ICD-10-CM

## 2023-09-13 DIAGNOSIS — M54.2 NECK PAIN: ICD-10-CM

## 2023-09-13 DIAGNOSIS — Z87.828: ICD-10-CM

## 2023-09-13 DIAGNOSIS — M94.261 CHONDROMALACIA OF RIGHT KNEE: ICD-10-CM

## 2023-09-13 DIAGNOSIS — G89.29 CHRONIC PAIN OF RIGHT KNEE: Primary | ICD-10-CM

## 2023-09-13 DIAGNOSIS — M47.812 FACET ARTHROPATHY, CERVICAL: ICD-10-CM

## 2023-09-13 DIAGNOSIS — M25.561 CHRONIC PAIN OF RIGHT KNEE: Primary | ICD-10-CM

## 2023-09-13 PROCEDURE — 99213 OFFICE O/P EST LOW 20 MIN: CPT | Performed by: NURSE PRACTITIONER

## 2023-09-13 PROCEDURE — 1159F MED LIST DOCD IN RCRD: CPT | Performed by: NURSE PRACTITIONER

## 2023-09-13 PROCEDURE — 1160F RVW MEDS BY RX/DR IN RCRD: CPT | Performed by: NURSE PRACTITIONER

## 2023-09-13 PROCEDURE — 96372 THER/PROPH/DIAG INJ SC/IM: CPT | Performed by: NURSE PRACTITIONER

## 2023-09-13 RX ORDER — TRIAMCINOLONE ACETONIDE 40 MG/ML
80 INJECTION, SUSPENSION INTRA-ARTICULAR; INTRAMUSCULAR ONCE
Status: COMPLETED | OUTPATIENT
Start: 2023-09-13 | End: 2023-09-13

## 2023-09-13 RX ADMIN — TRIAMCINOLONE ACETONIDE 80 MG: 40 INJECTION, SUSPENSION INTRA-ARTICULAR; INTRAMUSCULAR at 09:05

## 2023-09-13 NOTE — PROGRESS NOTES
"Steve Bethea is a 82 y.o. male returns for     Chief Complaint   Patient presents with    Right Knee - Follow-up, Pain, Edema    Cervical Spine - Pain, Follow-up     HISTORY OF PRESENT ILLNESS: Patient presents to office for follow-up of chronic right knee pain.  Patient was recently seen in office on 8/18/2023 and given a repeat intra-articular injection of steroid as he had experienced increased right knee pain in the 6 weeks prior to that.  Patient previously reported a fall that occurred several weeks ago while visiting his son in another state and playing basketball but he is unsure if this is related to his increased right knee pain.  Patient has a history of chronic/recurrent right knee pain that started several years ago and has progressively worsened over time.  Patient previously experienced acute exacerbation of right knee pain with swelling/effusion and no known injury in February 2019 and he established care with orthopedics at that time.  Patient has been treated previously with a therapeutic aspiration of his right knee joint, previous intra-articular injections of steroid, previous intramuscular injections of steroid as given by his PCP, modified weightbearing with use of a cane, use of a compression sleeve, Tylenol and prescription oral NSAIDs.     Previous MRI imaging of the right knee performed in February 2019 showed evidence of grade III and IV chondromalacia, a complex tear of the medial meniscus, joint effusion at that time and a Baker's cyst.  Recent x-rays of the right knee have shown moderate osteoarthritic changes in the right knee, primarily affecting the medial compartment but no evidence of acute injury or fracture.     Patient reports that his right knee pain improved following the intra-articular injection of steroid given at last office visit.  However, patient states that he \"jogged\" around his house last night as he felt that his heart rate was low and he wanted to get his " "heart rate up and he did notice some increased right knee pain after this activity.  Overall, he is doing better since his last office visit.  The patient is mobilizing well in office today with no use of any assistive device.  Patient has been using a compression sleeve to his right knee as needed.  He was also previously fitted with a hinged knee brace but he has stopped wearing the hinged knee brace and states it was too restrictive so he went back to his compression sleeve.  Patient has continued to take Tylenol as needed for any pain.  No new complaints or concerns noted since last office visit.  No falls or injuries reported since last office visit.  Current pain scale is 0/10 (while at rest).     Patient also complains of continued chronic neck pain, which has been an ongoing issue for several years.  Previous x-rays of the cervical spine performed in office at last visit showed extensive degenerative changes throughout the cervical spine.  Patient describes the pain as aching in nature with limited range of motion, stiffness and frequent popping sensations.  Patient reports that he drives frequently and that he has increased neck pain while driving when trying to turn his neck.  Patient has no history of any surgeries of the cervical spine and no history of any known injuries to his neck.  Patient denies any radicular pain/symptoms in his upper extremities.  He has no new complaints or concerns regarding his chronic neck pain today.     CONCURRENT MEDICAL HISTORY:    The following portions of the patient's history were reviewed and updated as appropriate: allergies, current medications, past family history, past medical history, past social history, past surgical history and problem list.     ROS  No fevers or chills.  No chest pain or shortness of air.  No GI or  disturbances. Right knee pain. Neck pain.     PHYSICAL EXAMINATION:       Ht 177.8 cm (70\")   Wt 68.5 kg (151 lb)   BMI 21.67 kg/m² "     Physical Exam  Vitals reviewed.   Constitutional:       General: He is not in acute distress.     Appearance: He is well-developed. He is not ill-appearing.   HENT:      Head: Normocephalic.   Pulmonary:      Effort: Pulmonary effort is normal. No respiratory distress.   Abdominal:      General: There is no distension.      Palpations: Abdomen is soft.   Musculoskeletal:         General: Swelling (Mild, right knee) and tenderness (Mild, right knee, cervical spine) present. No deformity or signs of injury.      Right knee: No effusion.      Instability Tests: Medial Collins test positive. Lateral Collins test negative.   Skin:     General: Skin is warm and dry.      Capillary Refill: Capillary refill takes less than 2 seconds.      Findings: No erythema.   Neurological:      Mental Status: He is alert and oriented to person, place, and time.      GCS: GCS eye subscore is 4. GCS verbal subscore is 5. GCS motor subscore is 6.   Psychiatric:         Speech: Speech normal.         Behavior: Behavior normal.         Thought Content: Thought content normal.         Judgment: Judgment normal.       GAIT:     []  Normal  [x]  Antalgic (mild)    Assistive device: [x]  None  []  Walker     []  Crutches  []  Cane     []  Wheelchair []  Stretcher    Right Knee Exam     Tenderness   The patient is experiencing tenderness in the medial joint line (Mild, diffuse).    Range of Motion   Extension:  0   Flexion:  130     Tests   Collins:  Medial - positive Lateral - negative  Varus: negative Valgus: negative    Other   Erythema: absent  Sensation: normal  Pulse: present  Swelling: mild  Effusion: no effusion present    Comments:  No pain with range of motion and overall very good range of motion of the knee joint, improved from prior exam. No signs of infection noted.      Back Exam     Tenderness   The patient is experiencing tenderness in the cervical (Mild).    Range of Motion   Extension:  abnormal   Flexion:  abnormal    Rotation right:  abnormal   Rotation left:  abnormal     Other   Sensation: normal  Erythema: no back redness    Comments:  Mild pain and moderate limitations with range of motion.  No focal neurological deficits noted.      XR Spine Cervical 2 or 3 View     Result Date: 8/18/2023  Narrative: HISTORY: Cervical pain. VIEWS: Frontal, lateral and open-mouth films of the cervical spine were obtained. FINDINGS: There is extensive bridging osteophyte formation extending from C3 through C7. This is unchanged compared to prior study.  There is loss of normal intervertebral disc space height at all cervical levels which is similar to the patient's prior study.  There is advanced facet arthropathy at all cervical levels.      Impression: 1.  Severe degenerative changes which are similar to the patient's prior study. 2.  Extensive bridging osteophyte formation. 3.  No acute fracture or dislocation seen. 4.  If there is high clinical suspicion for fracture, further evaluation with CT is recommended.     XR Knee 4+ View Right     Result Date: 8/10/2023  Narrative: RIGHT KNEE 4 VIEWS Indication: Acute pain of right knee. Fall, initial encounter. Right knee pain after fall. COMPARISON: None. FINDINGS: Knee joint is well-aligned. Mild osteoarthritic change present with small marginal osteophytes and mild medial compartment narrowing. No acute displaced fracture or subluxation. No significant joint effusion. Atherosclerotic calcifications in the posterior soft tissues.     Impression:   No acute osseous abnormality. Mild osteoarthritic changes as above, not beyond expectation for age. [id number]       MRI right knee     HISTORY: Right knee pain, effusion. Prior examination knee fairly  12, 5479.     TECHNIQUE: Multiplanar multisequence noncontrast images right  knee.     FINDINGS:     Normal intact anterior and posterior cruciate ligaments.      Degenerative osteoarthritic changes. Cartilage loss, grade 3 and  grade IV  chondromalacia (osteochondral defects) distal femur  medial aspect.     Complex degenerative type tear with a bucket-handle abnormality  posterior horn medial meniscus. Normal anterior horn. Subtle  signal abnormalities, mucoid type degenerative changes lateral  meniscus. No discrete tears identified.     Small effusion. Moderate size popliteal, Baker's cyst 5.25 x1.16  x 2.18 cm.          Normal medial collateral ligament and lateral collateral  ligament complex.     IMPRESSION:  CONCLUSION: Degenerative osteoarthritic changes medial aspect  tibiofemoral joint. Cartilage loss. Grade 3 and grade IV  chondromalacia (osteochondral defects) distal femur medial  aspect.     Complex tear and bucket-handle abnormality posterior horn medial  meniscus.  Normal anterior horn.      Areas of signal abnormality, mucoid type degenerative changes  lateral meniscus. No discrete tear is identified.     Joint effusion. Popliteal, Baker's cyst.         MRI right knee is otherwise unremarkable.     Electronically signed by:  Daniel Blanco MD  2/19/2019 2:12 PM CST  Workstation: [id number]      ASSESSMENT:    Diagnoses and all orders for this visit:    Chronic pain of right knee  -     triamcinolone acetonide (KENALOG-40) injection 80 mg    Primary osteoarthritis of right knee  -     triamcinolone acetonide (KENALOG-40) injection 80 mg    Neck pain  -     triamcinolone acetonide (KENALOG-40) injection 80 mg    Chondromalacia of right knee  -     triamcinolone acetonide (KENALOG-40) injection 80 mg    History of bucket handle tear of medial meniscus  -     triamcinolone acetonide (KENALOG-40) injection 80 mg    Synovial cyst of right knee  -     triamcinolone acetonide (KENALOG-40) injection 80 mg    Facet arthropathy, cervical  -     triamcinolone acetonide (KENALOG-40) injection 80 mg    Degenerative disc disease, cervical  -     triamcinolone acetonide (KENALOG-40) injection 80 mg    PLAN    Patient is doing well overall and reports that  his right knee pain improved following the repeat intra-articular injection of steroid given about 3 weeks ago on 8/18/2023.  Patient states he was doing very well until he jogged around his house last night and he could feel some increase in his right knee pain at that time but it is not severe and he is not having any difficulty bearing weight or ambulating.  Patient has a long history of chronic/recurrent right knee pain and has been treated conservatively as described in the HPI above.  X-ray images have shown moderate osteoarthritic changes in the right knee, primarily in the medial aspect.  Patient has stopped wearing the hinged knee brace and states it was too restrictive and he has gone back to wearing a compression sleeve as needed, which is fine.  Overall, his right knee pain has improved since last office visit.  We discussed and I have advised against running/jogging as it would likely increase his knee pain and I encouraged him to focus on activities that are not as high impact.  Overall, the patient is very active for his age.     Recommend the following for knee pain:     -Rest and activity modification as tolerated and based on pain.  -Modified weightbearing of the affected extremity with use of a cane or walker as needed.  -Gradual progression of weightbearing and activity as pain and swelling allow.  -Conditioning and strengthening exercises of the bilateral knees/legs.  -Elevation and ice therapy to the affected knee to minimize pain/swelling/inflammation.   -Tylenol as needed for pain/discomfort.  Patient should avoid oral NSAIDs due to his current use of Plavix for chronic anticoagulation therapy.    Patient also complains of continued chronic neck pain, which has been an ongoing issue for several years.  Previous x-ray images performed at last office visit showed advanced/severe multilevel degenerative changes with significantly advanced facet arthropathy and prominent bridging osteophytes  throughout his entire cervical spine.  Patient inquires about potential treatments for his neck pain.  We discussed that the treatments would primarily be focused on management of his pain.  We did discuss the possibility of neurosurgical referral to discuss surgical options (if there are any) but he declines this today and states he is not interested in surgical intervention for his cervical spine.  Recommend proceeding today with an intramuscular injection of Kenalog 80 mg for management of pain/inflammation.  The patient is not having any concerning radicular pain or symptoms involving the upper extremities.  Patient is encouraged to utilize ice therapy and/or heat therapy to his neck to minimize pain/inflammation/muscle tension.  Recommend to continue with Tylenol as needed for pain/discomfort.  We also discussed that if the intramuscular injection of Kenalog is ineffective, referral to a pain management specialist to discuss possible localized injections of steroid into the cervical spine may be an option.    Follow-up as needed for any new, worsening or persistent symptoms. Consider trial of viscosupplementation if his right knee pain persists or worsens. Consider referral to physical therapy.     Time spent of a minimum of 30 minutes including the face to face evaluation, reviewing of medical history and prior medial records, reviewing of diagnostic studies, ordering additional tests, documentation, patient education and coordination of care.      [facility] [employer] Disclaimer: Some of this note may be an electronic transcription/translation of spoken language to printed text using the Dragon Dictation System.      Return if symptoms worsen or fail to improve.       This document has been electronically signed by VONDA Caraballo on September 13, 2023 13:27 CDT     \  VONDA Caraballo

## (undated) DEVICE — CANN SMPL SOFTECH BIFLO ETCO2 A/M 7FT

## (undated) DEVICE — TRAP FLD MINIVAC MEGADYNE 100ML

## (undated) DEVICE — SINGLE-USE BIOPSY FORCEPS: Brand: RADIAL JAW 4